# Patient Record
Sex: MALE | Race: WHITE | NOT HISPANIC OR LATINO | Employment: FULL TIME | URBAN - NONMETROPOLITAN AREA
[De-identification: names, ages, dates, MRNs, and addresses within clinical notes are randomized per-mention and may not be internally consistent; named-entity substitution may affect disease eponyms.]

---

## 2017-01-10 ENCOUNTER — OFFICE VISIT (OUTPATIENT)
Dept: GASTROENTEROLOGY | Facility: CLINIC | Age: 62
End: 2017-01-10

## 2017-01-10 VITALS
TEMPERATURE: 98.1 F | OXYGEN SATURATION: 97 % | HEIGHT: 67 IN | SYSTOLIC BLOOD PRESSURE: 136 MMHG | DIASTOLIC BLOOD PRESSURE: 88 MMHG | HEART RATE: 55 BPM | WEIGHT: 209 LBS | BODY MASS INDEX: 32.8 KG/M2

## 2017-01-10 DIAGNOSIS — K63.5 COLON POLYPS: Primary | ICD-10-CM

## 2017-01-10 PROCEDURE — S0260 H&P FOR SURGERY: HCPCS | Performed by: NURSE PRACTITIONER

## 2017-01-10 RX ORDER — SODIUM, POTASSIUM,MAG SULFATES 17.5-3.13G
2 SOLUTION, RECONSTITUTED, ORAL ORAL ONCE
Qty: 2 BOTTLE | Refills: 0 | Status: SHIPPED | OUTPATIENT
Start: 2017-01-10 | End: 2017-01-10

## 2017-01-10 RX ORDER — MELOXICAM 10 MG/1
1 CAPSULE ORAL DAILY
COMMUNITY
End: 2021-06-23 | Stop reason: ALTCHOICE

## 2017-01-10 RX ORDER — LISINOPRIL 20 MG/1
20 TABLET ORAL DAILY
COMMUNITY
Start: 2017-01-03

## 2017-01-10 RX ORDER — FLUTICASONE PROPIONATE 50 MCG
SPRAY, SUSPENSION (ML) NASAL
COMMUNITY
Start: 2016-12-01 | End: 2020-02-12

## 2017-01-10 NOTE — MR AVS SNAPSHOT
Frankie Reid   1/10/2017 2:00 PM   Office Visit    Dept Phone:  [phone]   Encounter #:  [id number]    Provider:  RILEY Rosas   Department:  [facility] GASTROENTEROLOGY                Your Full Care Plan              Today's Medication Changes          These changes are accurate as of: 1/10/17  1:51 PM.  If you have any questions, ask your nurse or doctor.               New Medication(s)Ordered:     sodium-potassium-magnesium sulfates solution oral solution   Commonly known as:  SUPREP BOWEL PREP   Take 2 bottles by mouth 1 (One) Time for 1 dose. Split dose prep as directed by office instructions provided.  2 bottles = one kit.   Started by:  RILEY Rosas            Where to Get Your Medications      These medications were sent to [facility] - [address] - [phone] [facility] [phone] [address], [facility] [address]     Phone:  [phone]     sodium-potassium-magnesium sulfates solution oral solution                  Your Updated Medication List          This list is accurate as of: 1/10/17  1:51 PM.  Always use your most recent med list.                aspirin  MG tablet       fluticasone 50 MCG/ACT nasal spray   Commonly known as:  FLONASE       lisinopril 20 MG tablet   Commonly known as:  PRINIVIL,ZESTRIL       Meloxicam 10 MG capsule       MULTIVITAMIN ADULT PO       sodium-potassium-magnesium sulfates solution oral solution   Commonly known as:  SUPREP BOWEL PREP   Take 2 bottles by mouth 1 (One) Time for 1 dose. Split dose prep as directed by office instructions provided.  2 bottles = one kit.               We Performed the Following     Case request       You Were Diagnosed With        Codes Comments    Colon polyps    -  Primary ICD-10-CM: K63.5  ICD-9-CM: 211.3       Instructions     None    Patient Instructions History      Upcoming Appointments     Visit Type Date Time Department  "   SCOPE SCREENING 1/10/2017  2:00 PM [facility]      MyChart Signup     Our records indicate that you have declined [facility] sign[facility]. If you would like to sign up for [facility], please email [email or web address] or call [phone] to obtain an activation code.             Other Info from Your Visit           Your Appointments     Ash 10, 2017  2:00 PM CST   Scope Screen with RILEY Rosas   [facility] GASTRO[facility]OLOGY (--)    [address] [place] [address] [address]  Merged with [facility] [id number] [phone]              Allergies     Codeine        Reason for Visit     Colon Cancer Screening Patient states he currently is not having any problems.  No family history of colon cancer.      Vital Signs     Blood Pressure Pulse Temperature Height Weight Oxygen Saturation    136/88 55 98.1 °F (36.7 °C) 67\" (170.2 cm) 209 lb (94.8 kg) 97%    Body Mass Index Smoking Status                32.73 kg/m2 Never Smoker          Problems and Diagnoses Noted     Colon polyp        "

## 2017-01-10 NOTE — PROGRESS NOTES
Chief Complaint   Patient presents with   • Colon Cancer Screening     Patient states he currently is not having any problems.  No family history of colon cancer.     Subjective   HPI  Frankie Reid is a 61 y.o. male who presents as a referral for preventative maintenance. He has no complaints of nausea or vomiting. No change in bowels. No wt loss. No BRBPR. No melena. There is no family hx for colon cancer. No abdominal pain.The last colonoscopy 10/25/12 sigmoid polyp.  History reviewed. No pertinent past medical history.  Past Surgical History   Procedure Laterality Date   • Tonsillectomy     • Colonoscopy  10/25/2012       Current Outpatient Prescriptions:   •  aspirin  MG tablet, Take 325 mg by mouth Every 6 (Six) Hours As Needed., Disp: , Rfl:   •  lisinopril (PRINIVIL,ZESTRIL) 20 MG tablet, , Disp: , Rfl:   •  Meloxicam 10 MG capsule, Take 10 mg by mouth daily, Disp: , Rfl:   •  fluticasone (FLONASE) 50 MCG/ACT nasal spray, 2 sprays by Nasal route daily, Disp: , Rfl:   •  Multiple Vitamins-Minerals (MULTIVITAMIN ADULT PO), Take  by mouth., Disp: , Rfl:   •  sodium-potassium-magnesium sulfates (SUPREP BOWEL PREP) solution oral solution, Take 2 bottles by mouth 1 (One) Time for 1 dose. Split dose prep as directed by office instructions provided.  2 bottles = one kit., Disp: 2 bottle, Rfl: 0  Allergies   Allergen Reactions   • Codeine      Social History     Social History   • Marital status:      Spouse name: N/A   • Number of children: N/A   • Years of education: N/A     Occupational History   • Not on file.     Social History Main Topics   • Smoking status: Never Smoker   • Smokeless tobacco: Never Used   • Alcohol use Yes      Comment: daily   • Drug use: No   • Sexual activity: Not on file     Other Topics Concern   • Not on file     Social History Narrative     Family History   Problem Relation Age of Onset   • Colon polyps Sister    • Colon cancer Neg Hx    • Esophageal cancer Neg Hx    • Liver  "cancer Neg Hx    • Liver disease Neg Hx    • Rectal cancer Neg Hx    • Stomach cancer Neg Hx        REVIEW OF SYSTEMS  General: well appearing, no fever chills or sweats, no unexplained wt loss  HEENT: no acute visual or hearing disturbances  Cardiovascular: No chest pain or palpitations  Pulmonary: No shortness of breath, coughing, wheezing or hemoptysis  : No burning, urgency, hematuria, or dysuria  Musculoskeletal: No joint pain or stiffness  Peripheral: no edema  Skin: No lesions or rashes  Neuro: No dizziness, headaches, stroke, syncope  Endocrine: No hot or cold intolerances  Hematological: No blood dyscrasias    Objective   Vitals:    01/10/17 1326   BP: 136/88   Pulse: 55   Temp: 98.1 °F (36.7 °C)   SpO2: 97%   Weight: 209 lb (94.8 kg)   Height: 67\" (170.2 cm)     Body mass index is 32.73 kg/(m^2).    PHYSICAL EXAM  General: age appropriate well nourished well appearing, no acute distress  Head: normocephalic and atraumatic  Global assessment-supple  Neck-No JVD noted, no lymphadenopathy  Pulmonary-clear to auscultation bilaterally, normal respiratory effort  Cardiovascular-normal rate and rhythm, normal heart sounds, S1 and S2 noted  Abdomen-soft, non tender, non distended, normal bowel sounds all 4 quadrants, no hepatosplenomegaly noted  Extremities-No clubbing cyanosis or edema  Neuro-Non focal, converses appropriately, awake, alert, oriented    Imaging Results (most recent)     None        Assessment/Plan   Frankie was seen today for colon cancer screening.    Diagnoses and all orders for this visit:    Colon polyps  -     Case request colonoscopy    Other orders  -     sodium-potassium-magnesium sulfates (SUPREP BOWEL PREP) solution oral solution; Take 2 bottles by mouth 1 (One) Time for 1 dose. Split dose prep as directed by office instructions provided.  2 bottles = one kit.      COLONOSCOPY WITH ANESTHESIA (N/A)  No Follow-up on file.  There are no Patient Instructions on file for this visit.    All " risks, benefits, alternatives, and indications of colonoscopy procedure have been discussed with the patient. Risks to include perforation of the colon requiring possible surgery or colostomy, risk of bleeding from biopsies or removal of colon tissue, possibility of missing a colon polyp or cancer, or adverse drug reaction.  Benefits to include the diagnosis and management of disease of the colon and rectum. Alternatives to include barium enema, radiographic evaluation, lab testing or no intervention. Pt verbalizes understanding and agrees.

## 2017-02-08 ENCOUNTER — ANESTHESIA EVENT (OUTPATIENT)
Dept: GASTROENTEROLOGY | Facility: HOSPITAL | Age: 62
End: 2017-02-08

## 2017-02-10 ENCOUNTER — ANESTHESIA (OUTPATIENT)
Dept: GASTROENTEROLOGY | Facility: HOSPITAL | Age: 62
End: 2017-02-10

## 2017-02-10 ENCOUNTER — HOSPITAL ENCOUNTER (OUTPATIENT)
Facility: HOSPITAL | Age: 62
Setting detail: HOSPITAL OUTPATIENT SURGERY
Discharge: HOME | End: 2017-02-10
Attending: INTERNAL MEDICINE | Admitting: INTERNAL MEDICINE

## 2017-02-10 VITALS
BODY MASS INDEX: 32.33 KG/M2 | WEIGHT: 206 LBS | RESPIRATION RATE: 16 BRPM | TEMPERATURE: 97.9 F | HEART RATE: 60 BPM | DIASTOLIC BLOOD PRESSURE: 75 MMHG | OXYGEN SATURATION: 95 % | SYSTOLIC BLOOD PRESSURE: 129 MMHG | HEIGHT: 67 IN

## 2017-02-10 DIAGNOSIS — K63.5 COLON POLYPS: ICD-10-CM

## 2017-02-10 PROCEDURE — 25010000002 PROPOFOL 10 MG/ML EMULSION: Performed by: NURSE ANESTHETIST, CERTIFIED REGISTERED

## 2017-02-10 PROCEDURE — 88305 TISSUE EXAM BY PATHOLOGIST: CPT | Performed by: INTERNAL MEDICINE

## 2017-02-10 RX ORDER — SODIUM CHLORIDE 9 MG/ML
100 INJECTION, SOLUTION INTRAVENOUS CONTINUOUS
Status: DISCONTINUED | OUTPATIENT
Start: 2017-02-10 | End: 2017-02-10 | Stop reason: HOSPADM

## 2017-02-10 RX ORDER — PROPOFOL 10 MG/ML
VIAL (ML) INTRAVENOUS AS NEEDED
Status: DISCONTINUED | OUTPATIENT
Start: 2017-02-10 | End: 2017-02-10 | Stop reason: SURG

## 2017-02-10 RX ORDER — SODIUM CHLORIDE 0.9 % (FLUSH) 0.9 %
1-10 SYRINGE (ML) INJECTION AS NEEDED
Status: DISCONTINUED | OUTPATIENT
Start: 2017-02-10 | End: 2017-02-10 | Stop reason: HOSPADM

## 2017-02-10 RX ORDER — SODIUM CHLORIDE 0.9 % (FLUSH) 0.9 %
1-10 SYRINGE (ML) INJECTION AS NEEDED
Status: CANCELLED | OUTPATIENT
Start: 2017-02-10

## 2017-02-10 RX ORDER — SODIUM CHLORIDE 9 MG/ML
100 INJECTION, SOLUTION INTRAVENOUS CONTINUOUS
Status: CANCELLED | OUTPATIENT
Start: 2017-02-10

## 2017-02-10 RX ADMIN — PROPOFOL 50 MG: 10 INJECTION, EMULSION INTRAVENOUS at 09:22

## 2017-02-10 RX ADMIN — PROPOFOL 50 MG: 10 INJECTION, EMULSION INTRAVENOUS at 09:31

## 2017-02-10 RX ADMIN — PROPOFOL 50 MG: 10 INJECTION, EMULSION INTRAVENOUS at 09:33

## 2017-02-10 RX ADMIN — PROPOFOL 50 MG: 10 INJECTION, EMULSION INTRAVENOUS at 09:27

## 2017-02-10 RX ADMIN — PROPOFOL 50 MG: 10 INJECTION, EMULSION INTRAVENOUS at 09:25

## 2017-02-10 RX ADMIN — PROPOFOL 50 MG: 10 INJECTION, EMULSION INTRAVENOUS at 09:20

## 2017-02-10 RX ADMIN — PROPOFOL 50 MG: 10 INJECTION, EMULSION INTRAVENOUS at 09:35

## 2017-02-10 RX ADMIN — SODIUM CHLORIDE 100 ML/HR: 9 INJECTION, SOLUTION INTRAVENOUS at 08:37

## 2017-02-10 RX ADMIN — PROPOFOL 50 MG: 10 INJECTION, EMULSION INTRAVENOUS at 09:21

## 2017-02-10 RX ADMIN — PROPOFOL 100 MG: 10 INJECTION, EMULSION INTRAVENOUS at 09:17

## 2017-02-10 NOTE — PLAN OF CARE
Problem: Patient Care Overview (Adult)  Goal: Discharge Needs Assessment  Outcome: Outcome(s) achieved Date Met:  02/10/17    02/10/17 1003   Discharge Needs Assessment   Concerns To Be Addressed no discharge needs identified   Equipment Needed After Discharge none   Discharge Disposition home or self-care   Self-Care   Equipment Currently Used at Home none   Current Health   Anticipated Changes Related to Illness none   Living Environment   Transportation Available car

## 2017-02-10 NOTE — ANESTHESIA PREPROCEDURE EVALUATION
Anesthesia Evaluation     Patient summary reviewed and Nursing notes reviewed   no history of anesthetic complications:  NPO Status: > 8 hours   Airway   Mallampati: III  possible difficult intubation  Dental    (+) upper dentures and lower dentures    Pulmonary - normal exam    breath sounds clear to auscultation  Cardiovascular - normal exam  Exercise tolerance: good (4-7 METS)    Rhythm: regular  Rate: normal    (+) hypertension well controlled,       Neuro/Psych  GI/Hepatic/Renal/Endo    (+) obesity,      Musculoskeletal     Abdominal   (+) obese,     Abdomen: soft.  Bowel sounds: normal.   Substance History   (+) alcohol use,      OB/GYN          Other   (+) arthritis                                 Anesthesia Plan    ASA 2     general     intravenous induction   Anesthetic plan and risks discussed with patient.

## 2017-02-10 NOTE — PLAN OF CARE
Problem: Patient Care Overview (Adult)  Goal: Adult Individualization and Mutuality  Outcome: Outcome(s) achieved Date Met:  02/10/17    02/10/17 1002   Individualization   Patient Specific Preferences none   Patient Specific Goals none   Patient Specific Interventions none   Mutuality/Individual Preferences   What Anxieties, Fears or Concerns Do You Have About Your Health or Care? none   What Questions Do You Have About Your Health or Care? none   What Information Would Help Us Give You More Personalized Care? none

## 2017-02-10 NOTE — ADDENDUM NOTE
Addendum  created 02/10/17 0953 by Malena Stephenson, RICHARD    Anesthesia Event edited, Anesthesia Intra Flowsheets edited, Anesthesia Intra Meds edited, Anesthesia Staff edited, Flowsheet data copied forward, Patient device added, Patient device removed, Procedure Event Log accessed, Sign clinical note

## 2017-02-10 NOTE — ANESTHESIA POSTPROCEDURE EVALUATION
Patient: Frankie Reid    Procedure Summary     Date Anesthesia Start Anesthesia Stop Room / Location    02/10/17 0915 0940 [facility] ENDOSCOPY 5 / [facility] ENDOSCOPY       Procedure Diagnosis Surgeon Provider    COLONOSCOPY WITH ANESTHESIA (N/A ) Colon polyps  (Colon polyps [K63.5]) MD Malena Guzmán CRNA          Anesthesia Type: general  Last vitals  BP      Temp      Pulse     Resp      SpO2        Post Anesthesia Care and Evaluation    Patient location during evaluation: PHASE II  Patient participation: complete - patient participated  Level of consciousness: awake and alert  Pain score: 0  Pain management: adequate  Airway patency: patent  Anesthetic complications: No anesthetic complications    Cardiovascular status: acceptable, hemodynamically stable and stable  Respiratory status: acceptable and room air  Hydration status: acceptable

## 2017-02-10 NOTE — PLAN OF CARE
Problem: Patient Care Overview (Adult)  Goal: Plan of Care Review  Outcome: Outcome(s) achieved Date Met:  02/10/17    02/10/17 1002   Coping/Psychosocial Response Interventions   Plan Of Care Reviewed With patient  (ex spouse)   Outcome Evaluation   Outcome Summary/Follow up Plan d/c criteria met

## 2017-02-10 NOTE — H&P
"    Chief Complaint:   History colon polyps    Subjective     HPI:   Get a colon polyp removed from the sigmoid colon in October 2012.  He denies any symptoms presently.    Past Medical History:   History reviewed. No pertinent past medical history.    Past Surgical History:  [unfilled]    Family History:  Family History   Problem Relation Age of Onset   • Colon polyps Sister    • Colon cancer Neg Hx    • Esophageal cancer Neg Hx    • Liver cancer Neg Hx    • Liver disease Neg Hx    • Rectal cancer Neg Hx    • Stomach cancer Neg Hx        Social History:   reports that he has never smoked. He has never used smokeless tobacco. He reports that he drinks alcohol. He reports that he does not use illicit drugs.    Medications:   Prescriptions Prior to Admission   Medication Sig Dispense Refill Last Dose   • aspirin  MG tablet Take 325 mg by mouth Every 6 (Six) Hours As Needed.   2/6/2017   • fluticasone (FLONASE) 50 MCG/ACT nasal spray 2 sprays by Nasal route daily   More than a month at Unknown time   • lisinopril (PRINIVIL,ZESTRIL) 20 MG tablet    2/8/2017   • Meloxicam 10 MG capsule Take 10 mg by mouth daily   2/6/2017       Allergies:  Codeine    ROS:    General: Weight stable  Resp: No SOA  Cardiovascular: No CP      Objective     Visit Vitals   • /84 (BP Location: Right arm, Patient Position: Sitting)   • Pulse 50   • Temp 97.9 °F (36.6 °C) (Temporal Artery )   • Resp 20   • Ht 67\" (170.2 cm)   • Wt 206 lb (93.4 kg)   • SpO2 96%   • BMI 32.26 kg/m2       Physical Exam   Constitutional: Pt is oriented to person, place, and in no distress.  Eyes: No icterus  ENMT: Unremarkable   Cardiovascular: Normal rate, regular rhythm.    Pulmonary/Chest: No distress.  No audible wheezes  Abdominal: Soft.   Skin: Skin is warm and dry.   Psychiatric: Mood, memory, affect and judgment appear normal.     Assessment/Plan     Diagnosis:  Colon polyps    Anticipated Surgical Procedure:  Colonoscopy    The risks, benefits, and " alternatives of colonoscopy were reviewed with the patient today.  Risks including perforation of the colon possibly requiring surgery or colostomy.  Additional risks include risk of bleeding from biopsies or removal of colon tissue.  There is also the risk of a drug reaction or problems with anesthesia.  This will be discussed with the further by the anesthesia team on the day of the procedure.  Lastly there is a possibility of missing a colon polyp or cancer.  The benefits include the diagnosis and management of disease of the colon and rectum.  Alternatives to colonoscopy include barium enema, laboratory testing, radiographic evaluation, or no intervention.  The patient verbalizes understanding and agrees.

## 2017-02-13 LAB
CYTO UR: NORMAL
LAB AP CASE REPORT: NORMAL
LAB AP CLINICAL INFORMATION: NORMAL
Lab: NORMAL
PATH REPORT.FINAL DX SPEC: NORMAL
PATH REPORT.GROSS SPEC: NORMAL

## 2017-09-18 RX ORDER — LISINOPRIL 20 MG/1
20 TABLET ORAL DAILY
COMMUNITY
End: 2017-10-03 | Stop reason: SDUPTHER

## 2017-09-18 RX ORDER — MELOXICAM 15 MG/1
15 TABLET ORAL DAILY
COMMUNITY
End: 2017-10-03 | Stop reason: SDUPTHER

## 2017-09-18 RX ORDER — SILDENAFIL 100 MG/1
100 TABLET, FILM COATED ORAL PRN
COMMUNITY
End: 2018-10-23 | Stop reason: SDUPTHER

## 2017-10-03 ENCOUNTER — OFFICE VISIT (OUTPATIENT)
Dept: FAMILY MEDICINE CLINIC | Age: 62
End: 2017-10-03
Payer: COMMERCIAL

## 2017-10-03 VITALS
HEIGHT: 67 IN | BODY MASS INDEX: 31.71 KG/M2 | RESPIRATION RATE: 18 BRPM | WEIGHT: 202 LBS | TEMPERATURE: 98.1 F | SYSTOLIC BLOOD PRESSURE: 138 MMHG | DIASTOLIC BLOOD PRESSURE: 86 MMHG | OXYGEN SATURATION: 98 % | HEART RATE: 50 BPM

## 2017-10-03 DIAGNOSIS — N52.9 ERECTILE DYSFUNCTION, UNSPECIFIED ERECTILE DYSFUNCTION TYPE: ICD-10-CM

## 2017-10-03 DIAGNOSIS — Z00.00 ANNUAL PHYSICAL EXAM: Primary | ICD-10-CM

## 2017-10-03 DIAGNOSIS — M25.561 CHRONIC PAIN OF RIGHT KNEE: ICD-10-CM

## 2017-10-03 DIAGNOSIS — G89.29 CHRONIC PAIN OF RIGHT KNEE: ICD-10-CM

## 2017-10-03 DIAGNOSIS — I10 ESSENTIAL (PRIMARY) HYPERTENSION: ICD-10-CM

## 2017-10-03 PROCEDURE — 99396 PREV VISIT EST AGE 40-64: CPT | Performed by: FAMILY MEDICINE

## 2017-10-03 RX ORDER — MELOXICAM 15 MG/1
15 TABLET ORAL DAILY
Qty: 90 TABLET | Refills: 3 | Status: SHIPPED | OUTPATIENT
Start: 2017-10-03 | End: 2018-10-23 | Stop reason: SDUPTHER

## 2017-10-03 RX ORDER — LISINOPRIL 20 MG/1
20 TABLET ORAL DAILY
Qty: 90 TABLET | Refills: 3 | Status: SHIPPED | OUTPATIENT
Start: 2017-10-03 | End: 2018-10-23 | Stop reason: SDUPTHER

## 2017-10-03 ASSESSMENT — PATIENT HEALTH QUESTIONNAIRE - PHQ9
SUM OF ALL RESPONSES TO PHQ QUESTIONS 1-9: 0
2. FEELING DOWN, DEPRESSED OR HOPELESS: 0
SUM OF ALL RESPONSES TO PHQ9 QUESTIONS 1 & 2: 0
1. LITTLE INTEREST OR PLEASURE IN DOING THINGS: 0

## 2017-10-03 NOTE — PROGRESS NOTES
[facility] [address] [place] [address]  Dept: [phone]  Dept Fax: [phone]  Loc: [phone]    Mars Ornelas is a 63 y.o. male who presents today for his medical conditions/complaints as noted below. Mars Ornelas is here for Annual Exam        HPI:   CC: Here today to discuss the following:    Here for yearly physical today. Hypertension  Compliant with medications. No adverse effects from medication. No lightheadedness, palpitations, or chest pain. HPI    Past Medical History:   Diagnosis Date    Hypertension     Joint pain       Past Surgical History:   Procedure Laterality Date    TONSILLECTOMY         Family History   Problem Relation Age of Onset    Diabetes Mother     High Blood Pressure Mother     Heart Disease Father        Social History   Substance Use Topics    Smoking status: Never Smoker    Smokeless tobacco: Never Used    Alcohol use Yes      Current Outpatient Prescriptions   Medication Sig Dispense Refill    lisinopril (PRINIVIL;ZESTRIL) 20 MG tablet Take 1 tablet by mouth daily 90 tablet 3    meloxicam (MOBIC) 15 MG tablet Take 1 tablet by mouth daily 90 tablet 3    aspirin 325 MG EC tablet Take 325 mg by mouth daily      sildenafil (VIAGRA) 100 MG tablet Take 100 mg by mouth as needed for Erectile Dysfunction       No current facility-administered medications for this visit. Allergies   Allergen Reactions    Codeine        Health Maintenance   Topic Date Due    Hepatitis C screen  1955    HIV screen  02/17/1972    DTaP/Tdap/Td vaccine (1 - Tdap) 02/17/1976    Lipid screen  02/17/1997    Diabetes screen  02/17/1997    Colon cancer screen colonoscopy  02/17/2007    Zostavax vaccine  02/17/2017    Flu vaccine (1) 09/01/2017       Subjective:      Review of Systems    Review of Systems   Constitutional: Negative for chills and fever. HENT: Negative for congestion. Respiratory: Negative for cough, chest tightness and shortness of breath. Cardiovascular: Negative for chest pain, palpitations and leg swelling. Gastrointestinal: Negative for abdominal pain, anal bleeding, constipation, diarrhea and nausea. Genitourinary: Negative for difficulty urinating. Psychiatric/Behavioral: Negative. See HPI for visit specific review of symptoms. All others negative      Objective:   /86  Pulse 50  Temp 98.1 °F (36.7 °C)  Resp 18  Ht 5' 7\" (1.702 m)  Wt 202 lb (91.6 kg)  SpO2 98%  BMI 31.64 kg/m2  Physical Exam    Physical Exam   Constitutional: appears well-developed. does not appear ill. Eyes: Pupils are equal, round, and reactive to light. Neck: Normal range of motion. Neck supple. Cardiovascular: Normal rate and regular rhythm. Exam reveals no friction rub. No murmur heard. Pulmonary/Chest: Effort normal and breath sounds normal. No respiratory distress. He has no wheezes. no rales. Abdominal: Soft. Bowel sounds are normal. He exhibits no distension. There is no tenderness. There is no rebound and no guarding. Musculoskeletal: exhibits no edema. Neurological: alert. Psychiatric: normal mood and affect. behavior is normal.     No results found for this or any previous visit (from the past 672 hour(s)). Assessment & Plan: The following diagnoses and conditions are stable with no further orders unless indicated:  1. Annual physical exam  Check the following lab studies prior to next visit  - Comprehensive Metabolic Panel; Future  - Psa screening; Future  - Lipid Panel; Future    2. Essential (primary) hypertension  Discussed lifestyle changes such as diet and exercise. Recommended eliminate processed food from diet such as sugar and fried foods. Recommended exercising at least 150 minutes/week. Try to do full body resistance training twice a week as well.   Offered suggestions for calorie counting such as phone apps and

## 2017-10-03 NOTE — MR AVS SNAPSHOT
After Visit Summary             Chris Noland   10/3/2017 8:15 AM   Office Visit    Description:  Male : 1955   Provider:  Adithya Anaya MD   Department:  [facility] FAMILY MEDICINE              Your Follow-Up and Future Appointments         Below is a list of your follow-up and future appointments. This may not be a complete list as you may have made appointments directly with providers that we are not aware of or your providers may have made some for you. Please call your providers to confirm appointments. It is important to keep your appointments. Please bring your current insurance card, photo ID, co-pay, and all medication bottles to your appointment. If self-pay, payment is expected at the time of service. Your To-Do List     Future Appointments Provider Department Dept Phone    4/3/2018 8:45 AM Adithya Anaya MD [address] [phone]    Please arrive 15 minutes prior to appointment time, bring insurance card and photo ID. Future Orders Complete By Expires    Comprehensive Metabolic Panel [WIN41 Custom]  10/3/2017 10/3/2018    Lipid Panel [LAB18 Custom]  10/3/2017 10/3/2018    Psa screening [[id number] Custom]  10/3/2017 10/3/2018    Follow-Up    Return in about 6 months (around 4/3/2018) for Routine follow up. Information from Your Visit        Department     Name Address Phone Fax    [address] [address] [address]  [phone]      You Were Seen for:         Comments    Essential (primary) hypertension   [211625]         Vital Signs     Blood Pressure Pulse Temperature Respirations Height Weight    138/86 50 98.1 °F (36.7 °C) 18 5' 7\" (1.702 m) 202 lb (91.6 kg)    Oxygen Saturation Body Mass Index Smoking Status             98% 31.64 kg/m2 Never Smoker         Additional Information about your Body Mass Index (BMI)           Your BMI as listed above is considered obese (30 or more).  BMI is an Tetanus Combination Vaccine (1 - Tdap) 2/17/1976    Cholesterol Screening 2/17/1997    Diabetes Screening 2/17/1997    Colonoscopy 2/17/2007    Zoster Vaccine 2/17/2017    Yearly Flu Vaccine (1) 9/1/2017            MyChart Signup           [employer]ast allows you to send messages to your doctor, view your test results, renew your prescriptions, schedule appointments, view visit notes, and more. How Do I Sign Up? 1. In your Internet browser, go to [email or web address]  2. Click on the Sign Up Now link in the Sign In box. You will see the New Member Sign Up page. 3. Enter your enercast Access Code exactly as it appears below. You will not need to use this code after youve completed the sign-up process. If you do not sign up before the expiration date, you must request a new code. enercast Access Code: [email or web address]  Expires: 12/2/2017  8:50 AM    4. Enter your Social Security Number ([id number]) and Date of Birth (mm/dd/yyyy) as indicated and click Submit. You will be taken to the next sign-up page. 5. Create a enercast ID. This will be your enercast login ID and cannot be changed, so think of one that is secure and easy to remember. 6. Create a enercast password. You can change your password at any time. 7. Enter your Password Reset Question and Answer. This can be used at a later time if you forget your password. 8. Enter your e-mail address. You will receive e-mail notification when new information is available in [address]. 9. Click Sign Up. You can now view your medical record. Additional Information  If you have questions, please contact the physician practice where you receive care. Remember, en[employer]ast is NOT to be used for urgent needs. For medical emergencies, dial 911. For questions regarding your enercast account call [phone]. If you have a clinical question, please call your doctor's office.

## 2017-10-05 DIAGNOSIS — Z00.00 ANNUAL PHYSICAL EXAM: ICD-10-CM

## 2017-10-05 LAB
ALBUMIN SERPL-MCNC: 3.9 G/DL (ref 3.5–5.2)
ALP BLD-CCNC: 62 U/L (ref 40–130)
ALT SERPL-CCNC: 28 U/L (ref 5–41)
ANION GAP SERPL CALCULATED.3IONS-SCNC: 13 MMOL/L (ref 7–19)
AST SERPL-CCNC: 22 U/L (ref 5–40)
BILIRUB SERPL-MCNC: 1.1 MG/DL (ref 0.2–1.2)
BUN BLDV-MCNC: 18 MG/DL (ref 8–23)
CALCIUM SERPL-MCNC: 9.1 MG/DL (ref 8.8–10.2)
CHLORIDE BLD-SCNC: 101 MMOL/L (ref 98–111)
CHOLESTEROL, TOTAL: 184 MG/DL (ref 160–199)
CO2: 26 MMOL/L (ref 22–29)
CREAT SERPL-MCNC: 1.2 MG/DL (ref 0.5–1.2)
GFR NON-AFRICAN AMERICAN: >60
GLUCOSE BLD-MCNC: 97 MG/DL (ref 74–109)
HDLC SERPL-MCNC: 41 MG/DL (ref 55–121)
LDL CHOLESTEROL CALCULATED: 109 MG/DL
POTASSIUM SERPL-SCNC: 3.8 MMOL/L (ref 3.5–5)
PROSTATE SPECIFIC ANTIGEN: 0.73 NG/ML (ref 0–4)
SODIUM BLD-SCNC: 140 MMOL/L (ref 136–145)
TOTAL PROTEIN: 7.4 G/DL (ref 6.6–8.7)
TRIGL SERPL-MCNC: 171 MG/DL (ref 149–199)

## 2017-12-04 ENCOUNTER — TELEPHONE (OUTPATIENT)
Dept: FAMILY MEDICINE CLINIC | Age: 62
End: 2017-12-04

## 2018-04-12 ENCOUNTER — OFFICE VISIT (OUTPATIENT)
Dept: FAMILY MEDICINE CLINIC | Age: 63
End: 2018-04-12
Payer: COMMERCIAL

## 2018-04-12 VITALS
RESPIRATION RATE: 18 BRPM | TEMPERATURE: 96.7 F | HEART RATE: 64 BPM | DIASTOLIC BLOOD PRESSURE: 86 MMHG | BODY MASS INDEX: 32.58 KG/M2 | WEIGHT: 208 LBS | SYSTOLIC BLOOD PRESSURE: 138 MMHG | OXYGEN SATURATION: 93 %

## 2018-04-12 DIAGNOSIS — M25.561 CHRONIC PAIN OF RIGHT KNEE: ICD-10-CM

## 2018-04-12 DIAGNOSIS — Z12.5 SCREENING PSA (PROSTATE SPECIFIC ANTIGEN): ICD-10-CM

## 2018-04-12 DIAGNOSIS — Z13.220 LIPID SCREENING: ICD-10-CM

## 2018-04-12 DIAGNOSIS — G89.29 CHRONIC PAIN OF RIGHT KNEE: ICD-10-CM

## 2018-04-12 DIAGNOSIS — Z23 NEED FOR PROPHYLACTIC VACCINATION OR INOCULATION AGAINST DIPHTHERIA AND TETANUS: ICD-10-CM

## 2018-04-12 DIAGNOSIS — N52.9 ERECTILE DYSFUNCTION, UNSPECIFIED ERECTILE DYSFUNCTION TYPE: ICD-10-CM

## 2018-04-12 DIAGNOSIS — I10 ESSENTIAL (PRIMARY) HYPERTENSION: Primary | ICD-10-CM

## 2018-04-12 PROCEDURE — 99213 OFFICE O/P EST LOW 20 MIN: CPT | Performed by: FAMILY MEDICINE

## 2018-04-12 PROCEDURE — 3017F COLORECTAL CA SCREEN DOC REV: CPT | Performed by: FAMILY MEDICINE

## 2018-04-12 PROCEDURE — 1036F TOBACCO NON-USER: CPT | Performed by: FAMILY MEDICINE

## 2018-04-12 PROCEDURE — 90715 TDAP VACCINE 7 YRS/> IM: CPT | Performed by: FAMILY MEDICINE

## 2018-04-12 PROCEDURE — G8417 CALC BMI ABV UP PARAM F/U: HCPCS | Performed by: FAMILY MEDICINE

## 2018-04-12 PROCEDURE — 90471 IMMUNIZATION ADMIN: CPT | Performed by: FAMILY MEDICINE

## 2018-04-12 PROCEDURE — G8427 DOCREV CUR MEDS BY ELIG CLIN: HCPCS | Performed by: FAMILY MEDICINE

## 2018-04-20 ASSESSMENT — ENCOUNTER SYMPTOMS
CHEST TIGHTNESS: 0
NAUSEA: 0
SHORTNESS OF BREATH: 0
ABDOMINAL PAIN: 0
ANAL BLEEDING: 0
COUGH: 0
CONSTIPATION: 0
DIARRHEA: 0

## 2018-10-16 DIAGNOSIS — Z12.5 SCREENING PSA (PROSTATE SPECIFIC ANTIGEN): ICD-10-CM

## 2018-10-16 DIAGNOSIS — Z13.220 LIPID SCREENING: ICD-10-CM

## 2018-10-16 LAB
ALBUMIN SERPL-MCNC: 4.1 G/DL (ref 3.5–5.2)
ALP BLD-CCNC: 63 U/L (ref 40–130)
ALT SERPL-CCNC: 34 U/L (ref 5–41)
ANION GAP SERPL CALCULATED.3IONS-SCNC: 13 MMOL/L (ref 7–19)
AST SERPL-CCNC: 28 U/L (ref 5–40)
BILIRUB SERPL-MCNC: 0.9 MG/DL (ref 0.2–1.2)
BUN BLDV-MCNC: 17 MG/DL (ref 8–23)
CALCIUM SERPL-MCNC: 9.2 MG/DL (ref 8.8–10.2)
CHLORIDE BLD-SCNC: 102 MMOL/L (ref 98–111)
CHOLESTEROL, TOTAL: 162 MG/DL (ref 160–199)
CO2: 25 MMOL/L (ref 22–29)
CREAT SERPL-MCNC: 1.1 MG/DL (ref 0.5–1.2)
GFR NON-AFRICAN AMERICAN: >60
GLUCOSE BLD-MCNC: 91 MG/DL (ref 74–109)
HDLC SERPL-MCNC: 45 MG/DL (ref 55–121)
LDL CHOLESTEROL CALCULATED: 82 MG/DL
POTASSIUM SERPL-SCNC: 3.8 MMOL/L (ref 3.5–5)
PROSTATE SPECIFIC ANTIGEN: 0.96 NG/ML (ref 0–4)
SODIUM BLD-SCNC: 140 MMOL/L (ref 136–145)
TOTAL PROTEIN: 7.4 G/DL (ref 6.6–8.7)
TRIGL SERPL-MCNC: 173 MG/DL (ref 0–149)

## 2018-10-23 ENCOUNTER — OFFICE VISIT (OUTPATIENT)
Dept: FAMILY MEDICINE CLINIC | Age: 63
End: 2018-10-23
Payer: COMMERCIAL

## 2018-10-23 VITALS
RESPIRATION RATE: 16 BRPM | DIASTOLIC BLOOD PRESSURE: 82 MMHG | HEART RATE: 58 BPM | HEIGHT: 67 IN | OXYGEN SATURATION: 96 % | SYSTOLIC BLOOD PRESSURE: 132 MMHG | TEMPERATURE: 97.7 F | WEIGHT: 206 LBS | BODY MASS INDEX: 32.33 KG/M2

## 2018-10-23 DIAGNOSIS — G89.29 CHRONIC PAIN OF RIGHT KNEE: ICD-10-CM

## 2018-10-23 DIAGNOSIS — Z00.00 ANNUAL PHYSICAL EXAM: Primary | ICD-10-CM

## 2018-10-23 DIAGNOSIS — M25.561 CHRONIC PAIN OF RIGHT KNEE: ICD-10-CM

## 2018-10-23 DIAGNOSIS — Z23 NEEDS FLU SHOT: ICD-10-CM

## 2018-10-23 DIAGNOSIS — I10 ESSENTIAL (PRIMARY) HYPERTENSION: ICD-10-CM

## 2018-10-23 PROCEDURE — 90686 IIV4 VACC NO PRSV 0.5 ML IM: CPT | Performed by: FAMILY MEDICINE

## 2018-10-23 PROCEDURE — 99396 PREV VISIT EST AGE 40-64: CPT | Performed by: FAMILY MEDICINE

## 2018-10-23 PROCEDURE — G8482 FLU IMMUNIZE ORDER/ADMIN: HCPCS | Performed by: FAMILY MEDICINE

## 2018-10-23 PROCEDURE — 90471 IMMUNIZATION ADMIN: CPT | Performed by: FAMILY MEDICINE

## 2018-10-23 RX ORDER — MELOXICAM 15 MG/1
15 TABLET ORAL DAILY
Qty: 90 TABLET | Refills: 3 | Status: SHIPPED | OUTPATIENT
Start: 2018-10-23 | End: 2020-01-09

## 2018-10-23 RX ORDER — SILDENAFIL 100 MG/1
100 TABLET, FILM COATED ORAL PRN
Qty: 3 TABLET | Refills: 11 | Status: SHIPPED | OUTPATIENT
Start: 2018-10-23 | End: 2019-10-25 | Stop reason: SDUPTHER

## 2018-10-23 RX ORDER — LISINOPRIL 20 MG/1
20 TABLET ORAL DAILY
Qty: 90 TABLET | Refills: 3 | Status: SHIPPED | OUTPATIENT
Start: 2018-10-23 | End: 2019-10-25 | Stop reason: SDUPTHER

## 2018-10-23 ASSESSMENT — PATIENT HEALTH QUESTIONNAIRE - PHQ9
SUM OF ALL RESPONSES TO PHQ QUESTIONS 1-9: 0
2. FEELING DOWN, DEPRESSED OR HOPELESS: 0
1. LITTLE INTEREST OR PLEASURE IN DOING THINGS: 0
SUM OF ALL RESPONSES TO PHQ9 QUESTIONS 1 & 2: 0
SUM OF ALL RESPONSES TO PHQ QUESTIONS 1-9: 0

## 2018-10-23 NOTE — PROGRESS NOTES
screen  10/16/2023    DTaP/Tdap/Td vaccine (2 - Td) 04/12/2028    Flu vaccine  Completed       Subjective:      Review of Systems    Review of Systems   Constitutional: Negative for chills and fever. HENT: Negative for congestion. Respiratory: Negative for cough, chest tightness and shortness of breath. Cardiovascular: Negative for chest pain, palpitations and leg swelling. Gastrointestinal: Negative for abdominal pain, anal bleeding, constipation, diarrhea and nausea. Genitourinary: Negative for difficulty urinating. Psychiatric/Behavioral: Negative. SeeHPI for visit specific review of symptoms. All others negative      Objective:   /82   Pulse 58   Temp 97.7 °F (36.5 °C)   Resp 16   Ht 5' 7\" (1.702 m)   Wt 206 lb (93.4 kg)   SpO2 96%   BMI 32.26 kg/m²   Physical Exam    Physical Exam   Constitutional: appears well-developed. does not appear ill. Eyes: Pupils are equal, round, and reactive to light. Neck: Normal range of motion. Neck supple. Cardiovascular: Normal rate and regular rhythm. Exam reveals no friction rub. No murmur heard. Pulmonary/Chest: Effort normal and breath sounds normal. No respiratory distress. He has no wheezes. no rales. Abdominal: Soft. Bowel sounds are normal. He exhibits no distension. There is no tenderness. There is no rebound and no guarding. Musculoskeletal: exhibits no edema. Neurological: alert. Psychiatric: normal mood and affect.  behavior is normal.     Recent Results (from the past 672 hour(s))   Psa screening    Collection Time: 10/16/18  8:30 AM   Result Value Ref Range    PSA 0.96 0.00 - 4.00 ng/mL   Lipid Panel    Collection Time: 10/16/18  8:30 AM   Result Value Ref Range    Cholesterol, Total 162 160 - 199 mg/dL    Triglycerides 173 (H) 0 - 149 mg/dL    HDL 45 (L) 55 - 121 mg/dL    LDL Calculated 82 <100 mg/dL   Comprehensive Metabolic Panel    Collection Time: 10/16/18  8:30 AM   Result Value Ref Range    Sodium 140 136

## 2018-11-10 ENCOUNTER — OFFICE VISIT (OUTPATIENT)
Dept: URGENT CARE | Age: 63
End: 2018-11-10
Payer: COMMERCIAL

## 2018-11-10 VITALS
DIASTOLIC BLOOD PRESSURE: 77 MMHG | OXYGEN SATURATION: 96 % | TEMPERATURE: 98.6 F | HEIGHT: 67 IN | SYSTOLIC BLOOD PRESSURE: 127 MMHG | BODY MASS INDEX: 31.76 KG/M2 | WEIGHT: 202.38 LBS | RESPIRATION RATE: 14 BRPM | HEART RATE: 56 BPM

## 2018-11-10 DIAGNOSIS — J01.90 ACUTE SINUSITIS, RECURRENCE NOT SPECIFIED, UNSPECIFIED LOCATION: Primary | ICD-10-CM

## 2018-11-10 PROCEDURE — G8417 CALC BMI ABV UP PARAM F/U: HCPCS | Performed by: NURSE PRACTITIONER

## 2018-11-10 PROCEDURE — G8427 DOCREV CUR MEDS BY ELIG CLIN: HCPCS | Performed by: NURSE PRACTITIONER

## 2018-11-10 PROCEDURE — 99213 OFFICE O/P EST LOW 20 MIN: CPT | Performed by: NURSE PRACTITIONER

## 2018-11-10 PROCEDURE — G8482 FLU IMMUNIZE ORDER/ADMIN: HCPCS | Performed by: NURSE PRACTITIONER

## 2018-11-10 PROCEDURE — 3017F COLORECTAL CA SCREEN DOC REV: CPT | Performed by: NURSE PRACTITIONER

## 2018-11-10 PROCEDURE — 96372 THER/PROPH/DIAG INJ SC/IM: CPT | Performed by: NURSE PRACTITIONER

## 2018-11-10 PROCEDURE — 1036F TOBACCO NON-USER: CPT | Performed by: NURSE PRACTITIONER

## 2018-11-10 RX ORDER — DOXYCYCLINE HYCLATE 100 MG
100 TABLET ORAL 2 TIMES DAILY
Qty: 20 TABLET | Refills: 0 | Status: SHIPPED | OUTPATIENT
Start: 2018-11-10 | End: 2018-11-20

## 2018-11-10 RX ORDER — DEXAMETHASONE SODIUM PHOSPHATE 10 MG/ML
10 INJECTION INTRAMUSCULAR; INTRAVENOUS ONCE
Status: COMPLETED | OUTPATIENT
Start: 2018-11-10 | End: 2018-11-10

## 2018-11-10 RX ORDER — BENZONATATE 100 MG/1
100 CAPSULE ORAL 3 TIMES DAILY PRN
Qty: 30 CAPSULE | Refills: 0 | Status: SHIPPED | OUTPATIENT
Start: 2018-11-10 | End: 2018-11-17

## 2018-11-10 RX ADMIN — DEXAMETHASONE SODIUM PHOSPHATE 10 MG: 10 INJECTION INTRAMUSCULAR; INTRAVENOUS at 08:51

## 2018-11-10 ASSESSMENT — ENCOUNTER SYMPTOMS
SORE THROAT: 0
WHEEZING: 0
COUGH: 1
GASTROINTESTINAL NEGATIVE: 1
EYES NEGATIVE: 1
ALLERGIC/IMMUNOLOGIC NEGATIVE: 1
SINUS PAIN: 1

## 2019-04-03 ENCOUNTER — OFFICE VISIT (OUTPATIENT)
Dept: URGENT CARE | Age: 64
End: 2019-04-03
Payer: COMMERCIAL

## 2019-04-03 VITALS
TEMPERATURE: 99.5 F | HEART RATE: 86 BPM | OXYGEN SATURATION: 97 % | DIASTOLIC BLOOD PRESSURE: 87 MMHG | HEIGHT: 67 IN | SYSTOLIC BLOOD PRESSURE: 145 MMHG | WEIGHT: 207 LBS | RESPIRATION RATE: 18 BRPM | BODY MASS INDEX: 32.49 KG/M2

## 2019-04-03 DIAGNOSIS — J06.9 URI, ACUTE: Primary | ICD-10-CM

## 2019-04-03 PROCEDURE — G8417 CALC BMI ABV UP PARAM F/U: HCPCS | Performed by: NURSE PRACTITIONER

## 2019-04-03 PROCEDURE — G8427 DOCREV CUR MEDS BY ELIG CLIN: HCPCS | Performed by: NURSE PRACTITIONER

## 2019-04-03 PROCEDURE — 1036F TOBACCO NON-USER: CPT | Performed by: NURSE PRACTITIONER

## 2019-04-03 PROCEDURE — 3017F COLORECTAL CA SCREEN DOC REV: CPT | Performed by: NURSE PRACTITIONER

## 2019-04-03 PROCEDURE — 99213 OFFICE O/P EST LOW 20 MIN: CPT | Performed by: NURSE PRACTITIONER

## 2019-04-03 RX ORDER — DEXTROMETHORPHAN HYDROBROMIDE AND PROMETHAZINE HYDROCHLORIDE 15; 6.25 MG/5ML; MG/5ML
SYRUP ORAL
Qty: 177.44 ML | Refills: 0 | Status: SHIPPED | OUTPATIENT
Start: 2019-04-03 | End: 2019-04-23 | Stop reason: ALTCHOICE

## 2019-04-03 RX ORDER — METHYLPREDNISOLONE 4 MG/1
TABLET ORAL
Qty: 1 KIT | Refills: 0 | Status: SHIPPED | OUTPATIENT
Start: 2019-04-03 | End: 2019-04-23 | Stop reason: ALTCHOICE

## 2019-04-03 RX ORDER — BENZONATATE 100 MG/1
100 CAPSULE ORAL 3 TIMES DAILY PRN
Qty: 21 CAPSULE | Refills: 0 | Status: SHIPPED | OUTPATIENT
Start: 2019-04-03 | End: 2019-04-10

## 2019-04-03 ASSESSMENT — ENCOUNTER SYMPTOMS
EYES NEGATIVE: 1
GASTROINTESTINAL NEGATIVE: 1
VOICE CHANGE: 0
SHORTNESS OF BREATH: 0
COUGH: 1
TROUBLE SWALLOWING: 0
RHINORRHEA: 0
SINUS PRESSURE: 0
ALLERGIC/IMMUNOLOGIC NEGATIVE: 1
SORE THROAT: 1

## 2019-04-03 NOTE — LETTER
[facility]  [address] [place] [address]  Phone: [phone]    TODD Alves - CNP        April 3, 2019     Patient: Chula Rivera   YOB: 1955   Date of Visit: 4/3/2019       To Whom it May Concern:    William Huffman was seen in my clinic on 4/3/2019. He may return to work on 04/04/19. If you have any questions or concerns, please don't hesitate to call.     Sincerely,         TODD Alves CNP

## 2019-04-03 NOTE — PROGRESS NOTES
left. No tonsillar exudate. Eyes: Conjunctivae are normal.   Neck: Normal range of motion. Cardiovascular: Normal rate and regular rhythm. Pulmonary/Chest: Effort normal and breath sounds normal.   Abdominal: Soft. Normal appearance and bowel sounds are normal.   Lymphadenopathy:        Head (right side): No submental, no submandibular, no tonsillar, no preauricular, no posterior auricular and no occipital adenopathy present. Head (left side): No submental, no submandibular, no tonsillar, no preauricular, no posterior auricular and no occipital adenopathy present. He has no cervical adenopathy. Neurological: He is alert and oriented to person, place, and time. Skin: Skin is warm and intact. Capillary refill takes less than 2 seconds. No rash noted. Psychiatric: He has a normal mood and affect. His speech is normal and behavior is normal. Judgment and thought content normal. Cognition and memory are normal.   Vitals reviewed. BP (!) 145/87   Pulse 86   Temp 99.5 °F (37.5 °C)   Resp 18   Ht 5' 7\" (1.702 m)   Wt 207 lb (93.9 kg)   SpO2 97%   BMI 32.42 kg/m²     Assessment:          Diagnosis Orders   1. URI, acute  methylPREDNISolone (MEDROL, AGGIE,) 4 MG tablet       Plan:    No orders of the defined types were placed in this encounter. No follow-ups on file. No orders of the defined types were placed in this encounter. Orders Placed This Encounter   Medications    benzonatate (TESSALON PERLES) 100 MG capsule     Sig: Take 1 capsule by mouth 3 times daily as needed for Cough     Dispense:  21 capsule     Refill:  0    promethazine-dextromethorphan (PROMETHAZINE-DM) 6.25-15 MG/5ML syrup     Sig: Take one teaspoon only at night as needed for cough. Don't take and drive     Dispense:  177.44 mL     Refill:  0    methylPREDNISolone (MEDROL, AGGIE,) 4 MG tablet     Sig: Take by mouth.      Dispense:  1 kit     Refill:  0       Patient given educationalmaterials - see patient instructions. Discussed use, benefit, and side effectsof prescribed medications. All patient questions answered. Pt voiced understanding. Reviewed health maintenance. Instructed to continue current medications, diet andexercise. Patient agreed with treatment plan. Follow up as directed. Patient Instructions   1. Mucinex an or zyrtec  2. Tessalon perls as needed for cough  3. Phenergan DM only at night  4. Rest and increase fluids  5. Follow up as needed  6.  Take medrol dose pack as prescribed        Electronically signed by TODD Montilla CNP on 4/3/2019 at 2:01 PM

## 2019-04-03 NOTE — PATIENT INSTRUCTIONS
1. Mucinex an or zyrtec  2. Tessalon perls as needed for cough  3. Phenergan DM only at night  4. Rest and increase fluids  5. Follow up as needed  6.  Take medrol dose pack as prescribed

## 2019-04-23 ENCOUNTER — OFFICE VISIT (OUTPATIENT)
Dept: FAMILY MEDICINE CLINIC | Age: 64
End: 2019-04-23
Payer: COMMERCIAL

## 2019-04-23 VITALS
HEART RATE: 62 BPM | BODY MASS INDEX: 32.73 KG/M2 | WEIGHT: 209 LBS | TEMPERATURE: 98.2 F | OXYGEN SATURATION: 98 % | SYSTOLIC BLOOD PRESSURE: 118 MMHG | DIASTOLIC BLOOD PRESSURE: 86 MMHG

## 2019-04-23 DIAGNOSIS — I10 ESSENTIAL (PRIMARY) HYPERTENSION: Primary | ICD-10-CM

## 2019-04-23 DIAGNOSIS — Z12.5 ENCOUNTER FOR PROSTATE CANCER SCREENING: ICD-10-CM

## 2019-04-23 DIAGNOSIS — N52.9 ERECTILE DYSFUNCTION, UNSPECIFIED ERECTILE DYSFUNCTION TYPE: ICD-10-CM

## 2019-04-23 DIAGNOSIS — J20.9 ACUTE BRONCHITIS, UNSPECIFIED ORGANISM: ICD-10-CM

## 2019-04-23 DIAGNOSIS — G89.29 CHRONIC PAIN OF RIGHT KNEE: ICD-10-CM

## 2019-04-23 DIAGNOSIS — Z13.220 LIPID SCREENING: ICD-10-CM

## 2019-04-23 DIAGNOSIS — M25.561 CHRONIC PAIN OF RIGHT KNEE: ICD-10-CM

## 2019-04-23 PROCEDURE — 99214 OFFICE O/P EST MOD 30 MIN: CPT | Performed by: FAMILY MEDICINE

## 2019-04-23 PROCEDURE — 1036F TOBACCO NON-USER: CPT | Performed by: FAMILY MEDICINE

## 2019-04-23 PROCEDURE — G8417 CALC BMI ABV UP PARAM F/U: HCPCS | Performed by: FAMILY MEDICINE

## 2019-04-23 PROCEDURE — G8427 DOCREV CUR MEDS BY ELIG CLIN: HCPCS | Performed by: FAMILY MEDICINE

## 2019-04-23 PROCEDURE — 96372 THER/PROPH/DIAG INJ SC/IM: CPT | Performed by: FAMILY MEDICINE

## 2019-04-23 PROCEDURE — 3017F COLORECTAL CA SCREEN DOC REV: CPT | Performed by: FAMILY MEDICINE

## 2019-04-23 RX ORDER — ALBUTEROL SULFATE 90 UG/1
2 AEROSOL, METERED RESPIRATORY (INHALATION) EVERY 6 HOURS PRN
Qty: 1 INHALER | Refills: 0 | Status: SHIPPED | OUTPATIENT
Start: 2019-04-23

## 2019-04-23 RX ORDER — AZITHROMYCIN 250 MG/1
TABLET, FILM COATED ORAL
Qty: 1 PACKET | Refills: 0 | Status: SHIPPED | OUTPATIENT
Start: 2019-04-23 | End: 2019-10-25 | Stop reason: ALTCHOICE

## 2019-04-23 RX ORDER — MONTELUKAST SODIUM 10 MG/1
10 TABLET ORAL DAILY
Qty: 30 TABLET | Refills: 3 | Status: SHIPPED | OUTPATIENT
Start: 2019-04-23 | End: 2021-04-05

## 2019-04-23 RX ORDER — DEXAMETHASONE SODIUM PHOSPHATE 10 MG/ML
10 INJECTION INTRAMUSCULAR; INTRAVENOUS ONCE
Status: COMPLETED | OUTPATIENT
Start: 2019-04-23 | End: 2019-04-23

## 2019-04-23 RX ADMIN — DEXAMETHASONE SODIUM PHOSPHATE 10 MG: 10 INJECTION INTRAMUSCULAR; INTRAVENOUS at 09:06

## 2019-04-23 ASSESSMENT — ENCOUNTER SYMPTOMS
ANAL BLEEDING: 0
COUGH: 1
ABDOMINAL PAIN: 0
CHEST TIGHTNESS: 0
NAUSEA: 0
DIARRHEA: 0
SHORTNESS OF BREATH: 0
CONSTIPATION: 0
BACK PAIN: 0

## 2019-04-23 ASSESSMENT — PATIENT HEALTH QUESTIONNAIRE - PHQ9
1. LITTLE INTEREST OR PLEASURE IN DOING THINGS: 0
SUM OF ALL RESPONSES TO PHQ9 QUESTIONS 1 & 2: 0
2. FEELING DOWN, DEPRESSED OR HOPELESS: 0
SUM OF ALL RESPONSES TO PHQ QUESTIONS 1-9: 0
SUM OF ALL RESPONSES TO PHQ QUESTIONS 1-9: 0

## 2019-04-23 NOTE — PATIENT INSTRUCTIONS
1. Albuterol: 2 puffs three times a day for 5 days. Then as needed  2. Received steroid shot today  3. Start Azithromycin today  4. Start singulair today. May need to take singulair for a month or longer. _______________________________________________________________    Go to room 201 for labs prior to next visit. Be sure to fast for at least 12 hours prior to laboratory appointment. Would recommend getting labs about 1 week prior to the appointment time to ensure they are available at the time of the appointment. No appointment is necessary for laboratory work as the orders have already been placed.     Lab opens at 6:30 am and closes at 5:00 pm.

## 2019-04-23 NOTE — PROGRESS NOTES
tablet by mouth daily 90 tablet 3    meloxicam (MOBIC) 15 MG tablet Take 1 tablet by mouth daily 90 tablet 3    sildenafil (VIAGRA) 100 MG tablet Take 1 tablet by mouth as needed for Erectile Dysfunction 3 tablet 11    aspirin 325 MG EC tablet Take 325 mg by mouth daily       No current facility-administered medications for this visit. Allergies   Allergen Reactions    Codeine        Health Maintenance   Topic Date Due    Hepatitis C screen  1955    HIV screen  02/17/1972    Shingles Vaccine (1 of 2) 02/17/2007    Potassium monitoring  10/16/2019    Creatinine monitoring  10/16/2019    Colon cancer screen colonoscopy  02/20/2020    Lipid screen  10/16/2023    DTaP/Tdap/Td vaccine (2 - Td) 04/12/2028    Flu vaccine  Completed    Pneumococcal 0-64 years Vaccine  Aged Out       Subjective:      Review of Systems   Constitutional: Negative for chills and fever. HENT: Negative for congestion. Respiratory: Positive for cough. Negative for chest tightness and shortness of breath. Cardiovascular: Negative for chest pain, palpitations and leg swelling. Gastrointestinal: Negative for abdominal pain, anal bleeding, constipation, diarrhea and nausea. Genitourinary: Negative for difficulty urinating. Musculoskeletal: Positive for arthralgias and myalgias. Negative for back pain, gait problem, joint swelling and neck pain. Psychiatric/Behavioral: Negative. SeeHPI for visit specific review of symptoms. All others negative      Objective:   /86   Pulse 62   Temp 98.2 °F (36.8 °C)   Wt 209 lb (94.8 kg)   SpO2 98%   BMI 32.73 kg/m²   Physical Exam   Constitutional: He appears well-developed. He does not appear ill. Eyes: Pupils are equal, round, and reactive to light. Neck: Normal range of motion. Neck supple. Cardiovascular: Normal rate and regular rhythm. Exam reveals no friction rub. No murmur heard.   Pulmonary/Chest: Effort normal and breath sounds normal. No respiratory distress. He has no wheezes. He has no rales. Abdominal: Soft. Bowel sounds are normal. He exhibits no distension. There is no tenderness. There is no rebound and no guarding. Musculoskeletal: He exhibits no edema. Neurological: He is alert. Psychiatric: He has a normal mood and affect. His behavior is normal.         No results found for this or any previous visit (from the past 672 hour(s)). Assessment & Plan: The following diagnoses and conditions are stable with no further orders unless indicated:  1. Essential (primary) hypertension  BP Readings from Last 3 Encounters:   04/23/19 118/86   04/03/19 (!) 145/87   11/10/18 127/77     Blood pressure stable  - Comprehensive Metabolic Panel; Future    2. Encounter for prostate cancer screening    - Psa screening; Future    3. Lipid screening  Lab Results   Component Value Date    CHOL 162 10/16/2018    CHOL 184 10/05/2017     Lab Results   Component Value Date    TRIG 173 (H) 10/16/2018    TRIG 171 10/05/2017     Lab Results   Component Value Date    HDL 45 (L) 10/16/2018    HDL 41 (L) 10/05/2017     Lab Results   Component Value Date    LDLCALC 82 10/16/2018    LDLCALC 109 10/05/2017     No results found for: LABVLDL, VLDL  No results found for: CHOLHDLRATIO  Would recommend checking lipids next visit. - Lipid Panel; Future    4. Chronic pain of right knee  Continue meloxicam as needed    5. Acute bronchitis, unspecified organism  Patient Instructions   1. Albuterol: 2 puffs three times a day for 5 days. Then as needed  2. Received steroid shot today  3. Start Azithromycin today  4. Start singulair today. May need to take singulair for a month or longer. _______________________________________________________________    Go to room 201 for labs prior to next visit. Be sure to fast for at least 12 hours prior to laboratory appointment.    Would recommend getting labs about 1 week prior to the appointment time to ensure they are available at the time of the appointment. No appointment is necessary for laboratory work as the orders have already been placed. Lab opens at 6:30 am and closes at 5:00 pm.     Discussed risks and benefits of this new medication. Discussed potential side effects. He voiced understanding.       - dexamethasone (DECADRON) injection 10 mg  - albuterol sulfate  (90 Base) MCG/ACT inhaler; Inhale 2 puffs into the lungs every 6 hours as needed for Wheezing  Dispense: 1 Inhaler; Refill: 0  - montelukast (SINGULAIR) 10 MG tablet; Take 1 tablet by mouth daily  Dispense: 30 tablet; Refill: 3  - azithromycin (ZITHROMAX) 250 MG tablet; Take 2 tabs (500 mg) on Day 1, and take 1 tab (250 mg) on days 2 through 5. Dispense: 1 packet; Refill: 0    6. Erectile dysfunction, unspecified erectile dysfunction type  Stable            Return in about 6 months (around 10/23/2019) for Yearly Physical - 30 minute visit. Discussed use, benefit, and side effects of prescribed medications. All patient questions answered. Pt voiced understanding. Reviewed health maintenance. Instructedto continue current medications, diet and exercise. Patient agreed with treatmentplan. Follow up as directed.

## 2019-10-18 DIAGNOSIS — Z13.220 LIPID SCREENING: ICD-10-CM

## 2019-10-18 DIAGNOSIS — I10 ESSENTIAL (PRIMARY) HYPERTENSION: ICD-10-CM

## 2019-10-18 DIAGNOSIS — Z12.5 ENCOUNTER FOR PROSTATE CANCER SCREENING: ICD-10-CM

## 2019-10-18 LAB
ALBUMIN SERPL-MCNC: 4.2 G/DL (ref 3.5–5.2)
ALP BLD-CCNC: 75 U/L (ref 40–130)
ALT SERPL-CCNC: 28 U/L (ref 5–41)
ANION GAP SERPL CALCULATED.3IONS-SCNC: 18 MMOL/L (ref 7–19)
AST SERPL-CCNC: 23 U/L (ref 5–40)
BILIRUB SERPL-MCNC: 1 MG/DL (ref 0.2–1.2)
BUN BLDV-MCNC: 20 MG/DL (ref 8–23)
CALCIUM SERPL-MCNC: 9.4 MG/DL (ref 8.8–10.2)
CHLORIDE BLD-SCNC: 107 MMOL/L (ref 98–111)
CHOLESTEROL, TOTAL: 164 MG/DL (ref 160–199)
CO2: 22 MMOL/L (ref 22–29)
CREAT SERPL-MCNC: 1.1 MG/DL (ref 0.5–1.2)
GFR NON-AFRICAN AMERICAN: >60
GLUCOSE BLD-MCNC: 93 MG/DL (ref 74–109)
HDLC SERPL-MCNC: 47 MG/DL (ref 55–121)
LDL CHOLESTEROL CALCULATED: 88 MG/DL
POTASSIUM SERPL-SCNC: 4 MMOL/L (ref 3.5–5)
PROSTATE SPECIFIC ANTIGEN: 1.05 NG/ML (ref 0–4)
SODIUM BLD-SCNC: 147 MMOL/L (ref 136–145)
TOTAL PROTEIN: 7.8 G/DL (ref 6.6–8.7)
TRIGL SERPL-MCNC: 147 MG/DL (ref 0–149)

## 2019-10-25 ENCOUNTER — OFFICE VISIT (OUTPATIENT)
Dept: FAMILY MEDICINE CLINIC | Age: 64
End: 2019-10-25
Payer: COMMERCIAL

## 2019-10-25 ENCOUNTER — HOSPITAL ENCOUNTER (OUTPATIENT)
Dept: GENERAL RADIOLOGY | Age: 64
Discharge: HOME | End: 2019-10-25
Payer: COMMERCIAL

## 2019-10-25 VITALS
HEART RATE: 60 BPM | SYSTOLIC BLOOD PRESSURE: 116 MMHG | BODY MASS INDEX: 31.64 KG/M2 | TEMPERATURE: 97.6 F | WEIGHT: 202 LBS | OXYGEN SATURATION: 100 % | DIASTOLIC BLOOD PRESSURE: 88 MMHG

## 2019-10-25 DIAGNOSIS — Z00.00 ANNUAL PHYSICAL EXAM: Primary | ICD-10-CM

## 2019-10-25 DIAGNOSIS — M25.562 CHRONIC PAIN OF LEFT KNEE: ICD-10-CM

## 2019-10-25 DIAGNOSIS — G89.29 CHRONIC PAIN OF LEFT KNEE: ICD-10-CM

## 2019-10-25 DIAGNOSIS — I10 ESSENTIAL (PRIMARY) HYPERTENSION: ICD-10-CM

## 2019-10-25 DIAGNOSIS — Z23 NEED FOR INFLUENZA VACCINATION: ICD-10-CM

## 2019-10-25 PROCEDURE — G8482 FLU IMMUNIZE ORDER/ADMIN: HCPCS | Performed by: FAMILY MEDICINE

## 2019-10-25 PROCEDURE — 99396 PREV VISIT EST AGE 40-64: CPT | Performed by: FAMILY MEDICINE

## 2019-10-25 PROCEDURE — 90471 IMMUNIZATION ADMIN: CPT | Performed by: FAMILY MEDICINE

## 2019-10-25 PROCEDURE — 73562 X-RAY EXAM OF KNEE 3: CPT

## 2019-10-25 PROCEDURE — 90686 IIV4 VACC NO PRSV 0.5 ML IM: CPT | Performed by: FAMILY MEDICINE

## 2019-10-25 RX ORDER — LISINOPRIL 20 MG/1
20 TABLET ORAL DAILY
Qty: 90 TABLET | Refills: 3 | Status: SHIPPED | OUTPATIENT
Start: 2019-10-25 | End: 2020-12-22 | Stop reason: SDUPTHER

## 2019-10-25 RX ORDER — SILDENAFIL 100 MG/1
100 TABLET, FILM COATED ORAL PRN
Qty: 3 TABLET | Refills: 11 | Status: SHIPPED | OUTPATIENT
Start: 2019-10-25 | End: 2020-12-22 | Stop reason: SDUPTHER

## 2019-11-04 ENCOUNTER — HOSPITAL ENCOUNTER (OUTPATIENT)
Dept: MRI IMAGING | Age: 64
Discharge: HOME | End: 2019-11-04
Payer: COMMERCIAL

## 2019-11-04 DIAGNOSIS — M25.562 CHRONIC PAIN OF LEFT KNEE: ICD-10-CM

## 2019-11-04 DIAGNOSIS — G89.29 CHRONIC PAIN OF LEFT KNEE: ICD-10-CM

## 2019-11-04 PROCEDURE — 73721 MRI JNT OF LWR EXTRE W/O DYE: CPT

## 2019-11-05 ENCOUNTER — TELEPHONE (OUTPATIENT)
Dept: FAMILY MEDICINE CLINIC | Age: 64
End: 2019-11-05

## 2019-11-06 ENCOUNTER — TELEPHONE (OUTPATIENT)
Dept: FAMILY MEDICINE CLINIC | Age: 64
End: 2019-11-06

## 2019-11-06 DIAGNOSIS — M25.562 PAIN IN LATERAL PORTION OF LEFT KNEE: Primary | ICD-10-CM

## 2020-02-11 NOTE — H&P (VIEW-ONLY)
Chief Complaint   Patient presents with   • Colon Cancer Screening     Pt presents today for colon recall-last colon was 2/10/2017; Personal history of colon polyps; Family history of colon polyps     Subjective   HPI  Frankie Reid is a 64 y.o. male who presents as a referral for preventative maintenance. He has no complaints of nausea or vomiting. No change in bowels. No wt loss. No BRBPR. No melena. There is no family hx for colon cancer. No abdominal pain. There was no Cologuard screening this year.  The patient's last colonoscopy was performed on 2/10/2017 found to be normal.    Past Medical History:   Diagnosis Date   • ED (erectile dysfunction)    • Family history of colonic polyps    • History of colon polyps      Past Surgical History:   Procedure Laterality Date   • COLONOSCOPY N/A 2/10/2017    One 7mm sessile serated adenomatous polyp in the ascending colon; Two 5-7mm adenomatous polyps in the transverse colon; One 4mm hyperplastic polyp in the rectum; Repeat 3 years   • COLONOSCOPY  10/25/2012    One 6mm adenomatous polyp in the sigmoid colon; Repeat 4 years   • MULTIPLE TOOTH EXTRACTIONS     • TONSILLECTOMY         Current Outpatient Medications:   •  aspirin  MG tablet, Take 325 mg by mouth As Needed., Disp: , Rfl:   •  lisinopril (PRINIVIL,ZESTRIL) 20 MG tablet, Take 20 mg by mouth Daily., Disp: , Rfl:   •  Meloxicam 10 MG capsule, 1 tablet Daily., Disp: , Rfl:   •  sodium-potassium-magnesium sulfates (SUPREP BOWEL PREP KIT) 17.5-3.13-1.6 GM/177ML solution oral solution, Take 1 bottle by mouth Every 12 (Twelve) Hours. Split dose prep as directed by office instructions provided.  2 bottles = one kit., Disp: 2 bottle, Rfl: 0  Allergies   Allergen Reactions   • Codeine Nausea And Vomiting     Social History     Socioeconomic History   • Marital status:      Spouse name: Not on file   • Number of children: Not on file   • Years of education: Not on file   • Highest education level: Not on file  "  Tobacco Use   • Smoking status: Never Smoker   • Smokeless tobacco: Never Used   Substance and Sexual Activity   • Alcohol use: Yes     Comment: Occasionally   • Drug use: No     Family History   Problem Relation Age of Onset   • Colon polyps Sister    • Colon cancer Neg Hx    • Esophageal cancer Neg Hx    • Liver cancer Neg Hx    • Liver disease Neg Hx    • Rectal cancer Neg Hx    • Stomach cancer Neg Hx        REVIEW OF SYSTEMS  General: well appearing, no fever chills or sweats, no unexplained wt loss  HEENT: no acute visual or hearing disturbances  Cardiovascular: No chest pain or palpitations  Pulmonary: No shortness of breath, coughing, wheezing or hemoptysis  : No burning, urgency, hematuria, or dysuria  Musculoskeletal: No joint pain or stiffness  Peripheral: no edema  Skin: No lesions or rashes  Neuro: No dizziness, headaches, stroke, syncope  Endocrine: No hot or cold intolerances  Hematological: No blood dyscrasias    Objective   Vitals:    02/12/20 0945   BP: 126/84   BP Location: Left arm   Patient Position: Sitting   Cuff Size: Adult   Pulse: 62   Temp: 97.9 °F (36.6 °C)   TempSrc: Tympanic   SpO2: 98%   Weight: 93 kg (205 lb)   Height: 170.2 cm (67\")     Body mass index is 32.11 kg/m².    PHYSICAL EXAM  General: age appropriate well nourished well appearing, no acute distress  Head: normocephalic and atraumatic  Global assessment-supple  Neck-No JVD noted, no lymphadenopathy  Pulmonary-clear to auscultation bilaterally, normal respiratory effort  Cardiovascular-normal rate and rhythm, normal heart sounds, S1 and S2 noted  Abdomen-soft, non tender, non distended, normal bowel sounds all 4 quadrants, no hepatosplenomegaly noted  Extremities-No clubbing cyanosis or edema  Neuro-Non focal, converses appropriately, awake, alert, oriented    Imaging Results (Most Recent)     None        Assessment/Plan   Frankie was seen today for colon cancer screening.    Diagnoses and all orders for this " visit:    Encounter for colonoscopy due to history of adenomatous colonic polyps  -     Case Request; Standing  -     Case Request    Family history of polyps in the colon  -     Case Request; Standing  -     Case Request    Other orders  -     Follow Anesthesia Guidelines / Protocol; Future  -     Obtain Informed Consent; Future  -     sodium-potassium-magnesium sulfates (SUPREP BOWEL PREP KIT) 17.5-3.13-1.6 GM/177ML solution oral solution; Take 1 bottle by mouth Every 12 (Twelve) Hours. Split dose prep as directed by office instructions provided.  2 bottles = one kit.      COLONOSCOPY WITH ANESTHESIA (N/A)       Body mass index is 32.11 kg/m². Patient's Body mass index is 32.11 kg/m². BMI is above normal parameters. Recommendations include: nutrition counseling.      All risks, benefits, alternatives, and indications of colonoscopy procedure have been discussed with the patient. Risks to include perforation of the colon requiring possible surgery or colostomy, risk of bleeding from biopsies or removal of colon tissue, possibility of missing a colon polyp or cancer, or adverse drug reaction.  Benefits to include the diagnosis and management of disease of the colon and rectum. Alternatives to include barium enema, radiographic evaluation, lab testing or no intervention. Pt verbalizes understanding and agrees.

## 2020-02-12 ENCOUNTER — OFFICE VISIT (OUTPATIENT)
Dept: GASTROENTEROLOGY | Facility: CLINIC | Age: 65
End: 2020-02-12

## 2020-02-12 VITALS
HEIGHT: 67 IN | TEMPERATURE: 97.9 F | BODY MASS INDEX: 32.18 KG/M2 | HEART RATE: 62 BPM | DIASTOLIC BLOOD PRESSURE: 84 MMHG | OXYGEN SATURATION: 98 % | WEIGHT: 205 LBS | SYSTOLIC BLOOD PRESSURE: 126 MMHG

## 2020-02-12 DIAGNOSIS — Z12.11 ENCOUNTER FOR COLONOSCOPY DUE TO HISTORY OF ADENOMATOUS COLONIC POLYPS: Primary | ICD-10-CM

## 2020-02-12 DIAGNOSIS — Z86.010 ENCOUNTER FOR COLONOSCOPY DUE TO HISTORY OF ADENOMATOUS COLONIC POLYPS: Primary | ICD-10-CM

## 2020-02-12 DIAGNOSIS — Z83.71 FAMILY HISTORY OF POLYPS IN THE COLON: ICD-10-CM

## 2020-02-12 PROBLEM — Z86.0101 ENCOUNTER FOR COLONOSCOPY DUE TO HISTORY OF ADENOMATOUS COLONIC POLYPS: Status: ACTIVE | Noted: 2020-02-12

## 2020-02-12 PROBLEM — Z83.719 FAMILY HISTORY OF POLYPS IN THE COLON: Status: ACTIVE | Noted: 2020-02-12

## 2020-02-12 PROCEDURE — S0285 CNSLT BEFORE SCREEN COLONOSC: HCPCS | Performed by: NURSE PRACTITIONER

## 2020-02-12 RX ORDER — SODIUM, POTASSIUM,MAG SULFATES 17.5-3.13G
1 SOLUTION, RECONSTITUTED, ORAL ORAL EVERY 12 HOURS
Qty: 2 BOTTLE | Refills: 0 | Status: SHIPPED | OUTPATIENT
Start: 2020-02-12 | End: 2020-02-24 | Stop reason: HOSPADM

## 2020-02-24 ENCOUNTER — HOSPITAL ENCOUNTER (OUTPATIENT)
Facility: HOSPITAL | Age: 65
Setting detail: HOSPITAL OUTPATIENT SURGERY
Discharge: HOME | End: 2020-02-24
Attending: INTERNAL MEDICINE | Admitting: INTERNAL MEDICINE

## 2020-02-24 ENCOUNTER — ANESTHESIA (OUTPATIENT)
Dept: GASTROENTEROLOGY | Facility: HOSPITAL | Age: 65
End: 2020-02-24

## 2020-02-24 ENCOUNTER — ANESTHESIA EVENT (OUTPATIENT)
Dept: GASTROENTEROLOGY | Facility: HOSPITAL | Age: 65
End: 2020-02-24

## 2020-02-24 VITALS
SYSTOLIC BLOOD PRESSURE: 139 MMHG | OXYGEN SATURATION: 95 % | WEIGHT: 203 LBS | RESPIRATION RATE: 17 BRPM | TEMPERATURE: 97.5 F | BODY MASS INDEX: 31.86 KG/M2 | DIASTOLIC BLOOD PRESSURE: 90 MMHG | HEIGHT: 67 IN | HEART RATE: 56 BPM

## 2020-02-24 PROCEDURE — G0105 COLORECTAL SCRN; HI RISK IND: HCPCS | Performed by: INTERNAL MEDICINE

## 2020-02-24 PROCEDURE — 25010000002 PROPOFOL 10 MG/ML EMULSION: Performed by: NURSE ANESTHETIST, CERTIFIED REGISTERED

## 2020-02-24 RX ORDER — SODIUM CHLORIDE 9 MG/ML
100 INJECTION, SOLUTION INTRAVENOUS CONTINUOUS
Status: CANCELLED | OUTPATIENT
Start: 2020-02-24

## 2020-02-24 RX ORDER — SODIUM CHLORIDE 9 MG/ML
500 INJECTION, SOLUTION INTRAVENOUS CONTINUOUS PRN
Status: DISCONTINUED | OUTPATIENT
Start: 2020-02-24 | End: 2020-02-24 | Stop reason: HOSPADM

## 2020-02-24 RX ORDER — SODIUM CHLORIDE 0.9 % (FLUSH) 0.9 %
10 SYRINGE (ML) INJECTION AS NEEDED
Status: CANCELLED | OUTPATIENT
Start: 2020-02-24

## 2020-02-24 RX ORDER — SODIUM CHLORIDE 0.9 % (FLUSH) 0.9 %
10 SYRINGE (ML) INJECTION AS NEEDED
Status: DISCONTINUED | OUTPATIENT
Start: 2020-02-24 | End: 2020-02-24 | Stop reason: HOSPADM

## 2020-02-24 RX ORDER — PROPOFOL 10 MG/ML
VIAL (ML) INTRAVENOUS AS NEEDED
Status: DISCONTINUED | OUTPATIENT
Start: 2020-02-24 | End: 2020-02-24 | Stop reason: SURG

## 2020-02-24 RX ORDER — SODIUM CHLORIDE 0.9 % (FLUSH) 0.9 %
10 SYRINGE (ML) INJECTION EVERY 12 HOURS SCHEDULED
Status: CANCELLED | OUTPATIENT
Start: 2020-02-24

## 2020-02-24 RX ADMIN — PROPOFOL 50 MG: 10 INJECTION, EMULSION INTRAVENOUS at 13:02

## 2020-02-24 RX ADMIN — PROPOFOL 50 MG: 10 INJECTION, EMULSION INTRAVENOUS at 12:56

## 2020-02-24 RX ADMIN — PROPOFOL 100 MG: 10 INJECTION, EMULSION INTRAVENOUS at 12:44

## 2020-02-24 RX ADMIN — GLYCOPYRROLATE 0.2 MG: 0.2 INJECTION, SOLUTION INTRAMUSCULAR; INTRAVENOUS at 12:54

## 2020-02-24 RX ADMIN — PROPOFOL 50 MG: 10 INJECTION, EMULSION INTRAVENOUS at 12:49

## 2020-02-24 RX ADMIN — PROPOFOL 50 MG: 10 INJECTION, EMULSION INTRAVENOUS at 12:46

## 2020-02-24 RX ADMIN — SODIUM CHLORIDE 500 ML: 9 INJECTION, SOLUTION INTRAVENOUS at 10:41

## 2020-02-24 NOTE — ANESTHESIA POSTPROCEDURE EVALUATION
Patient: Frankie Reid    Procedure Summary     Date:  02/24/20 Room / Location:   [facility]    Anesthesia Start:  1240 Anesthesia Stop:  1310    Procedure:  COLONOSCOPY WITH ANESTHESIA (N/A ) Diagnosis:       Encounter for colonoscopy due to history of adenomatous colonic polyps      Family history of polyps in the colon      (Encounter for colonoscopy due to history of adenomatous colonic polyps [Z12.11, Z86.010])      (Family history of polyps in the colon [Z83.71])    Surgeon:  Dorothea Sales MD Provider:  Darrion Alvarez CRNA    Anesthesia Type:  MAC ASA Status:  2          Anesthesia Type: MAC    Vitals  Vitals Value Taken Time   /90 2/24/2020  1:25 PM   Temp     Pulse 52 2/24/2020  1:27 PM   Resp 17 2/24/2020  1:25 PM   SpO2 95 % 2/24/2020  1:27 PM   Vitals shown include unvalidated device data.        Post Anesthesia Care and Evaluation    Patient location during evaluation: PHASE II  Patient participation: complete - patient participated  Level of consciousness: awake and sleepy but conscious  Pain score: 0  Pain management: adequate  Airway patency: patent  Anesthetic complications: No anesthetic complications    Cardiovascular status: acceptable  Respiratory status: acceptable  Hydration status: acceptable

## 2020-02-24 NOTE — INTERVAL H&P NOTE
H&P updated. The patient was examined and the following changes are noted:        His last colonoscopy in February 2017 was not normal.  He had 3 adenomatous colon polyps removed.

## 2020-02-24 NOTE — ANESTHESIA PREPROCEDURE EVALUATION
Anesthesia Evaluation     no history of anesthetic complications:  NPO Solid Status: > 8 hours  NPO Liquid Status: > 4 hours           Airway   Mallampati: III  TM distance: >3 FB  Neck ROM: full  Dental    (+) edentulous, upper dentures and lower dentures    Pulmonary - normal exam    breath sounds clear to auscultation  (-) asthma, recent URI, sleep apnea, not a smoker  Cardiovascular - normal exam  Exercise tolerance: good (4-7 METS)    Rhythm: regular  Rate: normal    (+) hypertension,   (-) pacemaker, past MI, angina, cardiac stents, CABG      Neuro/Psych  (-) seizures, TIA, CVA  GI/Hepatic/Renal/Endo    (-) GERD, liver disease, no renal disease, diabetes, no thyroid disorder    Musculoskeletal     Abdominal    Substance History      OB/GYN          Other                        Anesthesia Plan    ASA 2     MAC     intravenous induction     Anesthetic plan, all risks, benefits, and alternatives have been provided, discussed and informed consent has been obtained with: patient.

## 2020-05-11 NOTE — PATIENT INSTRUCTIONS
What Everyone Should Know about Shingles Vaccine (Shingrix)    CONTACT YOUR INSURANCE TO SEE IF YOUR POLICY COVERS THIS VACCINE    One of the Recommended Vaccines  Shingles vaccination is the only way to protect against shingles and postherpetic neuralgia (PHN), the most common complication from shingles. CDC recommends that healthy adults 50 years and older get two doses of the shingles vaccine called Shingrix®,  by 2 to 6 months, to prevent shingles and the complications from the disease. Your doctor or pharmacist can give you Shingrix as a shot in your upper arm. Shingrix provides strong protection against shingles and PHN. Two doses of Shingrix is more than 90% effective at preventing shingles and PHN. Protection stays above 85% for at least the first four years after you get vaccinated. Shingrix is the preferred vaccine, over Zostavax®, a shingles vaccine in use since 2006. Who Should Get Shingrix? Healthy adults 50 years and older should get two doses of Shingrix,  by 2 to 6 months. You should get Shingrix even if in the past you   had shingles   received Zostavax   are not sure if you had chickenpox  Vaccine for Those 50 Years and Older  Shingrix reduces the risk of shingles and PHN by more than 90% in people 48 and older. CDC recommends the vaccine for healthy adults 48 and older. There is no maximum age for getting Shingrix. If you had shingles in the past, you can get Shingrix to help prevent future occurrences of the disease. There is no specific length of time that you need to wait after having shingles before you can receive Shingrix, but generally you should make sure the shingles rash has gone away before getting vaccinated. You can get Shingrix whether or not you remember having had chickenpox in the past. Studies show that more than 99% of Americans 40 years and older have had chickenpox, even if they dont remember having the disease.  Chickenpox and shingles are related because they are caused by the same virus (varicella zoster virus). After a person recovers from chickenpox, the virus stays dormant (inactive) in the body. It can reactivate years later and cause shingles. If you had Zostavax in the recent past, you should wait at least eight weeks before getting Shingrix. Talk to your healthcare provider to determine the best time to get Shingrix. Shingrix is available in [place] and pharmacies. If you have questions about Shingrix, talk with your healthcare provider. Who Should Not Get Shingrix? The side effects of the Shingrix are temporary, and usually last 2 to 3 days. While you may experience pain for a few days after getting Shingrix, the pain will be less severe than having shingles and the complications from the disease. You should not get Shingrix if you:   have ever had a severe allergic reaction to any component of the vaccine or after a dose of Shingrix   tested negative for immunity to varicella zoster virus. If you test negative, you should get chickenpox vaccine.  currently have shingles   currently are pregnant or breastfeeding. Women who are pregnant or breastfeeding should wait to get Shingrix. If you have a minor acute (starts suddenly) illness, such as a cold, you may get Shingrix. But if you have a moderate or severe acute illness, you should usually wait until you recover before getting the vaccine. This includes anyone with a temperature of 101.3°F or higher. How Well Does Shingrix Work? Two doses of Shingrix provides strong protection against shingles and postherpetic neuralgia (PHN), the most common complication of shingles.  In adults 48to 71years old who got two doses, Shingrix was 97% effective in preventing shingles; among adults 70 years and older, Shingrix was 91% effective.    In adults 48to 71years old who got two doses, Shingrix was 91% effective in preventing PHN; among adults 70 years and older, Shingrix was 89% [phone]. If you have any questions about side effects from Shingrix, talk with your doctor. The shingles vaccine does not contain thimerosal (a preservative containing mercury). How Can I Pay For Shingrix? There are several ways shingles vaccine may be paid for:   Medicare   Medicare Part D plans cover the shingles vaccine, but there may be a cost to you depending on your plan. There may be a copay for the vaccine, or you may need to pay in full then get reimbursed for a certain amount.  Medicare Part B does not cover the shingles vaccine. Medicaid   Medicaid may or may not cover the vaccine. Contact your insurer to find out. Private health insurance   Many private health insurance plans will cover the vaccine. Contact your insurer to find out. Vaccine assistance programs   Some pharmaceutical companies provide vaccines to eligible adults who cannot afford them. You may want to check with the vaccine , [employer], about Shingrix. yes

## 2020-11-03 ENCOUNTER — TELEPHONE (OUTPATIENT)
Dept: FAMILY MEDICINE CLINIC | Age: 65
End: 2020-11-03

## 2020-11-03 NOTE — TELEPHONE ENCOUNTER
Fausto Kothari requests that the nurse return their call. The best time to reach him is Anytime. Thank you. *Patient called to schedule an appointment for his physical w/ dr Gallo Weinstein. Patient needs a morning appt.

## 2020-12-16 DIAGNOSIS — Z00.00 ANNUAL PHYSICAL EXAM: ICD-10-CM

## 2020-12-16 LAB
ALBUMIN SERPL-MCNC: 3.9 G/DL (ref 3.5–5.2)
ALP BLD-CCNC: 77 U/L (ref 40–130)
ALT SERPL-CCNC: 25 U/L (ref 5–41)
ANION GAP SERPL CALCULATED.3IONS-SCNC: 10 MMOL/L (ref 7–19)
AST SERPL-CCNC: 20 U/L (ref 5–40)
BILIRUB SERPL-MCNC: 0.8 MG/DL (ref 0.2–1.2)
BUN BLDV-MCNC: 15 MG/DL (ref 8–23)
CALCIUM SERPL-MCNC: 8.9 MG/DL (ref 8.8–10.2)
CHLORIDE BLD-SCNC: 103 MMOL/L (ref 98–111)
CHOLESTEROL, FASTING: 170 MG/DL (ref 160–199)
CO2: 28 MMOL/L (ref 22–29)
CREAT SERPL-MCNC: 1.1 MG/DL (ref 0.5–1.2)
GFR AFRICAN AMERICAN: >59
GFR NON-AFRICAN AMERICAN: >60
GLUCOSE BLD-MCNC: 96 MG/DL (ref 74–109)
HDLC SERPL-MCNC: 35 MG/DL (ref 55–121)
LDL CHOLESTEROL CALCULATED: 90 MG/DL
POTASSIUM SERPL-SCNC: 3.7 MMOL/L (ref 3.5–5)
PROSTATE SPECIFIC ANTIGEN: 1.02 NG/ML (ref 0–4)
SODIUM BLD-SCNC: 141 MMOL/L (ref 136–145)
TOTAL PROTEIN: 7.2 G/DL (ref 6.6–8.7)
TRIGLYCERIDE, FASTING: 223 MG/DL (ref 0–149)

## 2020-12-22 ENCOUNTER — OFFICE VISIT (OUTPATIENT)
Dept: FAMILY MEDICINE CLINIC | Age: 65
End: 2020-12-22
Payer: COMMERCIAL

## 2020-12-22 VITALS
WEIGHT: 207 LBS | TEMPERATURE: 96.8 F | SYSTOLIC BLOOD PRESSURE: 136 MMHG | HEIGHT: 67 IN | BODY MASS INDEX: 32.49 KG/M2 | OXYGEN SATURATION: 98 % | DIASTOLIC BLOOD PRESSURE: 82 MMHG | HEART RATE: 65 BPM

## 2020-12-22 PROCEDURE — 90686 IIV4 VACC NO PRSV 0.5 ML IM: CPT | Performed by: FAMILY MEDICINE

## 2020-12-22 PROCEDURE — 90471 IMMUNIZATION ADMIN: CPT | Performed by: FAMILY MEDICINE

## 2020-12-22 PROCEDURE — G8482 FLU IMMUNIZE ORDER/ADMIN: HCPCS | Performed by: FAMILY MEDICINE

## 2020-12-22 PROCEDURE — 99396 PREV VISIT EST AGE 40-64: CPT | Performed by: FAMILY MEDICINE

## 2020-12-22 RX ORDER — LISINOPRIL 20 MG/1
20 TABLET ORAL DAILY
Qty: 90 TABLET | Refills: 3 | Status: SHIPPED | OUTPATIENT
Start: 2020-12-22 | End: 2022-01-31

## 2020-12-22 RX ORDER — SILDENAFIL 100 MG/1
100 TABLET, FILM COATED ORAL PRN
Qty: 3 TABLET | Refills: 11 | Status: SHIPPED | OUTPATIENT
Start: 2020-12-22 | End: 2021-04-05

## 2020-12-22 RX ORDER — MELOXICAM 15 MG/1
TABLET ORAL
Qty: 30 TABLET | Refills: 5 | Status: SHIPPED | OUTPATIENT
Start: 2020-12-22 | End: 2021-05-03 | Stop reason: ALTCHOICE

## 2020-12-22 SDOH — ECONOMIC STABILITY: TRANSPORTATION INSECURITY
IN THE PAST 12 MONTHS, HAS LACK OF TRANSPORTATION KEPT YOU FROM MEETINGS, WORK, OR FROM GETTING THINGS NEEDED FOR DAILY LIVING?: NO

## 2020-12-22 SDOH — ECONOMIC STABILITY: INCOME INSECURITY: HOW HARD IS IT FOR YOU TO PAY FOR THE VERY BASICS LIKE FOOD, HOUSING, MEDICAL CARE, AND HEATING?: NOT HARD AT ALL

## 2020-12-22 SDOH — ECONOMIC STABILITY: FOOD INSECURITY: WITHIN THE PAST 12 MONTHS, THE FOOD YOU BOUGHT JUST DIDN'T LAST AND YOU DIDN'T HAVE MONEY TO GET MORE.: NEVER TRUE

## 2020-12-22 SDOH — ECONOMIC STABILITY: TRANSPORTATION INSECURITY
IN THE PAST 12 MONTHS, HAS THE LACK OF TRANSPORTATION KEPT YOU FROM MEDICAL APPOINTMENTS OR FROM GETTING MEDICATIONS?: NO

## 2020-12-22 SDOH — ECONOMIC STABILITY: FOOD INSECURITY: WITHIN THE PAST 12 MONTHS, YOU WORRIED THAT YOUR FOOD WOULD RUN OUT BEFORE YOU GOT MONEY TO BUY MORE.: NEVER TRUE

## 2020-12-22 ASSESSMENT — PATIENT HEALTH QUESTIONNAIRE - PHQ9
SUM OF ALL RESPONSES TO PHQ QUESTIONS 1-9: 0
1. LITTLE INTEREST OR PLEASURE IN DOING THINGS: 0
SUM OF ALL RESPONSES TO PHQ QUESTIONS 1-9: 0
SUM OF ALL RESPONSES TO PHQ9 QUESTIONS 1 & 2: 0
SUM OF ALL RESPONSES TO PHQ QUESTIONS 1-9: 0
2. FEELING DOWN, DEPRESSED OR HOPELESS: 0

## 2020-12-22 NOTE — PROGRESS NOTES
[facility] PHYSICIAN SERVICES  [facility] FAMILY MEDICINE  [address] [facility] [address] [address]  Dept: [phone]  Dept Fax: [phone]  Loc: [phone]    Jd Hurley is a 63 y.o. male who pre I got some pains in the asked coming in today sents today for his medical conditions/complaints as noted below. Jd Hurley is here for Annual Exam        HPI:   CC: Here today to discuss the following:    Hypertension  Compliant with medications. No adverse effects from medication. No lightheadedness, palpitations, or chest pain. Erectile Dysfunction  Satisfied with current mediation. No adverse effects. Hyperlipidemia  Tolerating current cholesterol medication without side effects. No body aches. Attempting to reduce processed sugar and cholesterol from diet. HPI    Past Medical History:   Diagnosis Date    Hypertension     Joint pain       Past Surgical History:   Procedure Laterality Date    TONSILLECTOMY         Family History   Problem Relation Age of Onset    Diabetes Mother     High Blood Pressure Mother     Heart Disease Father        Social History     Tobacco Use    Smoking status: Never Smoker    Smokeless tobacco: Never Used   Substance Use Topics    Alcohol use: Yes     Current Outpatient Medications   Medication Sig Dispense Refill    lisinopril (PRINIVIL;ZESTRIL) 20 MG tablet Take 1 tablet by mouth daily 90 tablet 3    meloxicam (MOBIC) 15 MG tablet TAKE 1 TABLET BY MOUTH ONCE DAILY.  30 tablet 5    sildenafil (VIAGRA) 100 MG tablet Take 1 tablet by mouth as needed for Erectile Dysfunction 3 tablet 11    albuterol sulfate  (90 Base) MCG/ACT inhaler Inhale 2 puffs into the lungs every 6 hours as needed for Wheezing 1 Inhaler 0    montelukast (SINGULAIR) 10 MG tablet Take 1 tablet by mouth daily (Patient taking differently: Take 10 mg by mouth as needed ) 30 tablet 3    aspirin 325 MG EC tablet Take 325 mg by mouth daily No current facility-administered medications for this visit. Allergies   Allergen Reactions    Codeine        Health Maintenance   Topic Date Due    Hepatitis C screen  1955    HIV screen  02/17/1972    Colon cancer screen colonoscopy  02/20/2020    Potassium monitoring  12/16/2021    Creatinine monitoring  12/16/2021    Lipid screen  12/16/2025    DTaP/Tdap/Td vaccine (2 - Td) 04/12/2028    Flu vaccine  Completed    Shingles Vaccine  Completed    Hepatitis A vaccine  Aged Out    Hepatitis B vaccine  Aged Out    Hib vaccine  Aged Out    Meningococcal (ACWY) vaccine  Aged Out    Pneumococcal 0-64 years Vaccine  Aged Out       Subjective:      Review of Systems  Review of Systems   Constitutional: Negative for chills and fever. HENT: Negative for congestion. Respiratory: Negative for cough, chest tightness and shortness of breath. Cardiovascular: Negative for chest pain, palpitations and leg swelling. Gastrointestinal: Negative for abdominal pain, anal bleeding, constipation, diarrhea and nausea. Genitourinary: Negative for difficulty urinating. Psychiatric/Behavioral: Negative. SeeHPI for visit specific review of symptoms. All others negative      Objective:   /82   Pulse 65   Temp 96.8 °F (36 °C)   Ht 5' 7\" (1.702 m)   Wt 207 lb (93.9 kg)   SpO2 98%   BMI 32.42 kg/m²   Physical Exam    Physical Exam   Constitutional: He appears well-developed. Does not appear ill. Eyes: Pupils are equal, round, and reactive to light. Conjunctiva and Lids normal.  Neck: Normal range of motion. Neck supple. No masses. Neck Symmetric. Normal tracheal position. No thyroid enlargement  Cardiovascular: Normal rate and regular rhythm. Exam reveals no friction rub. Carotid arteries: no bruit observed. No murmur heard. Respiratory:  Effort normal and breath sounds normal. No respiratory distress. No wheezes. No rales. No use of accessory muscles or intercostal retractions. PSA 0.96 10/16/2018     Stable  2. Essential (primary) hypertension  BP Readings from Last 3 Encounters:   12/22/20 136/82   10/25/19 116/88   04/23/19 118/86     Blood pressure stable  - lisinopril (PRINIVIL;ZESTRIL) 20 MG tablet; Take 1 tablet by mouth daily  Dispense: 90 tablet; Refill: 3    3. Chronic pain of right knee  Refill of meloxicam  - meloxicam (MOBIC) 15 MG tablet; TAKE 1 TABLET BY MOUTH ONCE DAILY. Dispense: 30 tablet; Refill: 5    4. Need for influenza vaccination    - INFLUENZA, QUADV, 3 YRS AND OLDER, IM PF, PREFILL SYR OR SDV, 0.5ML (AFLURIA QUADV, PF)    5. Erectile dysfunction, unspecified erectile dysfunction type  Refilled Viagra  - sildenafil (VIAGRA) 100 MG tablet; Take 1 tablet by mouth as needed for Erectile Dysfunction  Dispense: 3 tablet; Refill: 11    6. Hypercholesterolemia  Lab Results   Component Value Date    CHOL 164 10/18/2019    CHOL 162 10/16/2018    CHOL 184 10/05/2017     Lab Results   Component Value Date    TRIG 147 10/18/2019    TRIG 173 (H) 10/16/2018    TRIG 171 10/05/2017     Lab Results   Component Value Date    HDL 35 (L) 12/16/2020    HDL 47 (L) 10/18/2019    HDL 45 (L) 10/16/2018     Lab Results   Component Value Date    LDLCALC 90 12/16/2020    LDLCALC 88 10/18/2019    [address] 82 10/16/2018     No results found for: LABVLDL, VLDL  No results found for: CHOLHDLRATIO    - Comprehensive Metabolic Panel; Future  - Lipid Panel; Future            Return in about 6 months (around 6/22/2021) for Routine follow up - 15 minute visit. Discussed use, benefit, and side effects of prescribed medications. All patient questions answered. Pt voiced understanding. Reviewed health maintenance. Instructedto continue current medications, diet and exercise. Patient agreed with treatmentplan. Follow up as directed. Note dictated using Dragon Dictation software  Sometimes this dictation software makes erroneous transcriptions.

## 2020-12-22 NOTE — PROGRESS NOTES
Vaccine Information Sheet, \"Influenza - Inactivated\" OR \"Live - Intranasal\"  given to Qamar Reyes, or parent/legal guardian of  Qamar Reyes and verbalized understanding. Patient responses:    Have you ever had a reaction to a flu vaccine? No  Are you able to eat eggs without adverse effects? Yes  Do you have any current illness? No  Have you ever had [place] Syndrome? No    Flu vaccine given per order. Please see immunization tab.

## 2021-04-05 ENCOUNTER — APPOINTMENT (OUTPATIENT)
Dept: GENERAL RADIOLOGY | Age: 66
End: 2021-04-05
Payer: COMMERCIAL

## 2021-04-05 ENCOUNTER — NURSE TRIAGE (OUTPATIENT)
Dept: OTHER | Facility: CLINIC | Age: 66
End: 2021-04-05

## 2021-04-05 ENCOUNTER — APPOINTMENT (OUTPATIENT)
Dept: CT IMAGING | Age: 66
End: 2021-04-05
Payer: COMMERCIAL

## 2021-04-05 ENCOUNTER — HOSPITAL ENCOUNTER (EMERGENCY)
Age: 66
Discharge: HOME | End: 2021-04-05
Attending: EMERGENCY MEDICINE
Payer: COMMERCIAL

## 2021-04-05 VITALS
BODY MASS INDEX: 32.18 KG/M2 | HEIGHT: 67 IN | OXYGEN SATURATION: 96 % | HEART RATE: 65 BPM | SYSTOLIC BLOOD PRESSURE: 144 MMHG | DIASTOLIC BLOOD PRESSURE: 77 MMHG | TEMPERATURE: 97 F | WEIGHT: 205 LBS | RESPIRATION RATE: 20 BRPM

## 2021-04-05 DIAGNOSIS — R93.2 ABNORMAL CT OF LIVER: ICD-10-CM

## 2021-04-05 DIAGNOSIS — R07.9 CHEST PAIN, UNSPECIFIED TYPE: Primary | ICD-10-CM

## 2021-04-05 LAB
ALBUMIN SERPL-MCNC: 3.6 G/DL (ref 3.5–5.2)
ALP BLD-CCNC: 77 U/L (ref 40–130)
ALT SERPL-CCNC: 20 U/L (ref 5–41)
ANION GAP SERPL CALCULATED.3IONS-SCNC: 8 MMOL/L (ref 7–19)
AST SERPL-CCNC: 20 U/L (ref 5–40)
BASOPHILS ABSOLUTE: 0 K/UL (ref 0–0.2)
BASOPHILS RELATIVE PERCENT: 0.5 % (ref 0–1)
BILIRUB SERPL-MCNC: 0.6 MG/DL (ref 0.2–1.2)
BUN BLDV-MCNC: 15 MG/DL (ref 8–23)
CALCIUM SERPL-MCNC: 9.3 MG/DL (ref 8.8–10.2)
CHLORIDE BLD-SCNC: 103 MMOL/L (ref 98–111)
CO2: 25 MMOL/L (ref 22–29)
CREAT SERPL-MCNC: 1.1 MG/DL (ref 0.5–1.2)
D DIMER: 1.47 UG/ML FEU (ref 0–0.48)
EOSINOPHILS ABSOLUTE: 0.1 K/UL (ref 0–0.6)
EOSINOPHILS RELATIVE PERCENT: 1.6 % (ref 0–5)
GFR AFRICAN AMERICAN: >59
GFR NON-AFRICAN AMERICAN: >60
GLUCOSE BLD-MCNC: 125 MG/DL (ref 74–109)
HCT VFR BLD CALC: 46.7 % (ref 42–52)
HEMOGLOBIN: 15.9 G/DL (ref 14–18)
IMMATURE GRANULOCYTES #: 0.1 K/UL
INR BLD: 1.03 (ref 0.88–1.18)
LIPASE: 58 U/L (ref 13–60)
LYMPHOCYTES ABSOLUTE: 2.5 K/UL (ref 1.1–4.5)
LYMPHOCYTES RELATIVE PERCENT: 30 % (ref 20–40)
MCH RBC QN AUTO: 33.1 PG (ref 27–31)
MCHC RBC AUTO-ENTMCNC: 34 G/DL (ref 33–37)
MCV RBC AUTO: 97.1 FL (ref 80–94)
MONOCYTES ABSOLUTE: 0.7 K/UL (ref 0–0.9)
MONOCYTES RELATIVE PERCENT: 8.7 % (ref 0–10)
NEUTROPHILS ABSOLUTE: 4.8 K/UL (ref 1.5–7.5)
NEUTROPHILS RELATIVE PERCENT: 58.6 % (ref 50–65)
PDW BLD-RTO: 12.2 % (ref 11.5–14.5)
PLATELET # BLD: 125 K/UL (ref 130–400)
PMV BLD AUTO: 11.9 FL (ref 9.4–12.4)
POTASSIUM REFLEX MAGNESIUM: 3.8 MMOL/L (ref 3.5–5)
PRO-BNP: 65 PG/ML (ref 0–900)
PROTHROMBIN TIME: 13.4 SEC (ref 12–14.6)
RBC # BLD: 4.81 M/UL (ref 4.7–6.1)
SODIUM BLD-SCNC: 136 MMOL/L (ref 136–145)
TOTAL PROTEIN: 7.1 G/DL (ref 6.6–8.7)
TROPONIN: <0.01 NG/ML (ref 0–0.03)
TROPONIN: <0.01 NG/ML (ref 0–0.03)
WBC # BLD: 8.2 K/UL (ref 4.8–10.8)

## 2021-04-05 PROCEDURE — 85025 COMPLETE CBC W/AUTO DIFF WBC: CPT

## 2021-04-05 PROCEDURE — 85610 PROTHROMBIN TIME: CPT

## 2021-04-05 PROCEDURE — 83880 ASSAY OF NATRIURETIC PEPTIDE: CPT

## 2021-04-05 PROCEDURE — 93005 ELECTROCARDIOGRAM TRACING: CPT | Performed by: NURSE PRACTITIONER

## 2021-04-05 PROCEDURE — 85379 FIBRIN DEGRADATION QUANT: CPT

## 2021-04-05 PROCEDURE — 80053 COMPREHEN METABOLIC PANEL: CPT

## 2021-04-05 PROCEDURE — 96374 THER/PROPH/DIAG INJ IV PUSH: CPT

## 2021-04-05 PROCEDURE — 2580000003 HC RX 258: Performed by: NURSE PRACTITIONER

## 2021-04-05 PROCEDURE — 99283 EMERGENCY DEPT VISIT LOW MDM: CPT

## 2021-04-05 PROCEDURE — 93005 ELECTROCARDIOGRAM TRACING: CPT | Performed by: EMERGENCY MEDICINE

## 2021-04-05 PROCEDURE — 71275 CT ANGIOGRAPHY CHEST: CPT

## 2021-04-05 PROCEDURE — 83690 ASSAY OF LIPASE: CPT

## 2021-04-05 PROCEDURE — 6360000004 HC RX CONTRAST MEDICATION: Performed by: NURSE PRACTITIONER

## 2021-04-05 PROCEDURE — 71045 X-RAY EXAM CHEST 1 VIEW: CPT

## 2021-04-05 PROCEDURE — 36415 COLL VENOUS BLD VENIPUNCTURE: CPT

## 2021-04-05 PROCEDURE — 84484 ASSAY OF TROPONIN QUANT: CPT

## 2021-04-05 RX ORDER — FUROSEMIDE 10 MG/ML
40 INJECTION INTRAMUSCULAR; INTRAVENOUS ONCE
Status: DISCONTINUED | OUTPATIENT
Start: 2021-04-05 | End: 2021-04-05

## 2021-04-05 RX ORDER — 0.9 % SODIUM CHLORIDE 0.9 %
1000 INTRAVENOUS SOLUTION INTRAVENOUS ONCE
Status: COMPLETED | OUTPATIENT
Start: 2021-04-05 | End: 2021-04-05

## 2021-04-05 RX ADMIN — SODIUM CHLORIDE 1000 ML: 9 INJECTION, SOLUTION INTRAVENOUS at 16:42

## 2021-04-05 RX ADMIN — IOPAMIDOL 90 ML: 755 INJECTION, SOLUTION INTRAVENOUS at 16:38

## 2021-04-05 ASSESSMENT — HEART SCORE: ECG: 0

## 2021-04-05 ASSESSMENT — ENCOUNTER SYMPTOMS
SHORTNESS OF BREATH: 0
VOMITING: 0

## 2021-04-05 NOTE — TELEPHONE ENCOUNTER
Received call from Sidney at pre-service center [facility]) [facility] with Red Flag Complaint. Brief description of triage: chest pain     Triage indicates for patient to go to [facility] NOW or to office with PCP approval calling PCP office to have them advise PCP office advised to go to ED for evaluation spoke with patient who advised he was on his way     Care advice provided, patient verbalizes understanding; denies any other questions or concerns; instructed to call back for any new or worsening symptoms. W    Attention Provider: Thank you for allowing me to participate in the care of your patient. The patient was connected to triage in response to information provided to the [facility]. Please do not respond through this encounter as the response is not directed to a shared pool. Reason for Disposition   Chest pain lasting longer than 5 minutes and occurred in last 3 days (72 hours) (Exception: feels exactly the same as previously diagnosed heartburn and has accompanying sour taste in mouth)    Answer Assessment - Initial Assessment Questions  1. LOCATION: \"Where does it hurt? \"        Started in the upper part of stomach below breast bond and moved up center of the chest     2. RADIATION: \"Does the pain go anywhere else? \" (e.g., into neck, jaw, arms, back)      No radiation     3. ONSET: \"When did the chest pain begin? \" (Minutes, hours or days)       Friday morning and went all the way to yesterday when he was finally michael to eat something and felt a little better he took two to three bites and nothing taste well   4. PATTERN \"Does the pain come and go, or has it been constant since it started? \"  \"Does it get worse with exertion? \"       It was constant pain it would at times not be as bad would drop down to a one or a two, yesterday afternoon he noted to be real weak and have hot flashes that the only thing he noticed that was different     5.  DURATION: \"How long does it last\" (e.g., seconds, minutes, hours) Days and only eased off a little but never went away until yesterday afternoon at 6pm and felt like it may start back up again today     6. SEVERITY: \"How bad is the pain? \"  (e.g., Scale 1-10; mild, moderate, or severe)     - MILD (1-3): doesn't interfere with normal activities      - MODERATE (4-7): interferes with normal activities or awakens from sleep     - SEVERE (8-10): excruciating pain, unable to do any normal activities        5 or 6 out of 10     7. CARDIAC RISK FACTORS: \"Do you have any history of heart problems or risk factors for heart disease? \" (e.g., angina, prior heart attack; diabetes, high blood pressure, high cholesterol, smoker, or strong family history of heart disease)      HTN, non smoker , no angina or prior MI or DM his sister does have a pace maker and his dad had heart problems     8. PULMONARY RISK FACTORS: \"Do you have any history of lung disease? \"  (e.g., blood clots in lung, asthma, emphysema, birth control pills)      He does have a little asthma maybe at times shortness of breath is noted     9. CAUSE: \"What do you think is causing the chest pain? \"      He turned his dentures in to be fixed he has not eaten properly for four days and he thinks this is why but not sure     10. OTHER SYMPTOMS: \"Do you have any other symptoms? \" (e.g., dizziness, nausea, vomiting, sweating, fever, difficulty breathing, cough)        No to all above     11. PREGNANCY: \"Is there any chance you are pregnant? \" \"When was your last menstrual period? \"        N/A    Protocols used: CHEST PAIN-ADULT-[place]

## 2021-04-05 NOTE — ED PROVIDER NOTES
[facility] EMERG[facility]Y DE[facility]  eMERGENCY dEPARTMENT eNCOUnter      Pt Name: Sana Montano  MRN: [id number]  [place] 1955  Date of evaluation: 4/5/2021  Provider: Cassell Fleischer, [address]       Chief Complaint   Patient presents with    Chest Pain     over the weekend; denies pain at this time         HISTORY OF PRESENT ILLNESS   (Location/Symptom, Timing/Onset,Context/Setting, Quality, Duration, Modifying Factors, Severity)  Note limiting factors. Sana Montano is a 61 y.o. male who presents to the emergency department WITH CHEST PAIN THAT STARTED Friday AND WAS PRESENT OVER the weekend. Better today but his doctor could not get him in so he was told to come over. NO cardiac history. No family history. Worse with activity but no pain now. midsternal  No radiation. No shortness of breath     The history is provided by the patient. Chest Pain  Pain location:  Substernal area  Pain quality: aching and stabbing    Pain radiates to:  Does not radiate  Pain severity:  Moderate  Onset quality:  Sudden  Duration:  3 days  Timing:  Intermittent  Progression:  Partially resolved  Context: at rest    Context: not breathing    Relieved by:  Nothing  Ineffective treatments:  None tried  Associated symptoms: no fever, no shortness of breath, no vomiting and no weakness    Risk factors: hypertension, male sex and obesity    Risk factors: no coronary artery disease, no diabetes mellitus and no smoking        NursingNotes were reviewed. REVIEW OF SYSTEMS    (2-9 systems for level 4, 10 or more for level 5)     Review of Systems   Constitutional: Negative for fever. Respiratory: Negative for shortness of breath. Cardiovascular: Positive for chest pain. Gastrointestinal: Negative for vomiting. Neurological: Negative for weakness. Except as noted above the remainder of the review of systems was reviewed and negative.        PAST MEDICAL HISTORY     Past Medical History:   Diagnosis Date    Hypertension abused: None     Physically abused: None     Forced sexual activity: None   Other Topics Concern    None   Social History Narrative    None       SCREENINGS      @FLOW([id number])@      PHYSICAL EXAM    (up to 7 for level 4, 8 or more for level 5)     ED Triage Vitals [04/05/21 1410]   BP Temp Temp src Pulse Resp SpO2 Height Weight   (!) 163/83 97 °F (36.1 °C) -- 68 20 95 % 5' 7\" (1.702 m) 205 lb (93 kg)       Physical Exam  Vitals signs and nursing note reviewed. Constitutional:       Appearance: He is well-developed. HENT:      Head: Normocephalic and atraumatic. Eyes:      General: No scleral icterus. Right eye: No discharge. Left eye: No discharge. Neck:      Musculoskeletal: Normal range of motion and neck supple. Cardiovascular:      Rate and Rhythm: Normal rate and regular rhythm. Pulmonary:      Effort: Pulmonary effort is normal. No respiratory distress. Breath sounds: Normal breath sounds. Abdominal:      Palpations: Abdomen is soft. Tenderness: There is no abdominal tenderness. Skin:     General: Skin is warm and dry. Capillary Refill: Capillary refill takes less than 2 seconds. Neurological:      Mental Status: He is alert and oriented to person, place, and time. Psychiatric:         Behavior: Behavior normal.         DIAGNOSTIC RESULTS     EKG: All EKG's are interpreted by the Emergency Department Physician who either signs or Co-signsthis chart in the absence of a cardiologist.  68 sinus rhythm minimal ST depressions in lateral leads read at 1415 by Dr. Kirsty Alston:   Ladoris Ripa such as CT, Ultrasound and MRI are read by the radiologist. Plain radiographic images are visualized and preliminarily interpreted by the emergency physician with the below findings:      Interpretation per the Radiologist below, if available at the time of this note:    CTA PULMONARY W CONTRAST   Final Result   1. No evidence of pulmonary embolus.    2. Myocardial megaly was a septation of the lungs. Consider fluid   overload. 3.  Nodular appearance of the liver, concerning for cirrhosis. .   Signed by Dr Reena Castrejon on 4/5/2021 4:50 PM      XR CHEST PORTABLE   Final Result   Portable chest x-ray demonstrates no active disease. Signed by Dr Leeanne Maurice on 4/5/2021 3:31 PM            ED BEDSIDEULTRASOUND:   Performed by ED Physician -none    LABS:  Labs Reviewed   CBC WITH AUTO DIFFERENTIAL - Abnormal; Notable for the following components:       Result Value    MCV 97.1 (*)     MCH 33.1 (*)     Platelets 616 (*)     All other components within normal limits   COMPREHENSIVE METABOLIC PANEL W/ REFLEX TO MG FOR LOW K - Abnormal; Notable for the following components:    Glucose 125 (*)     All other components within normal limits   D-DIMER, QUANTITATIVE - Abnormal; Notable for the following components:    D-Dimer, Quant 1.47 (*)     All other components within normal limits   TROPONIN   PROTIME-INR   BRAIN NATRIURETIC PEPTIDE   TROPONIN   LIPASE       All other labs were within normal range or not returned as of this dictation. EMERGENCY DEPARTMENT COURSE and DIFFERENTIALDIAGNOSIS/MDM:   Vitals:    Vitals:    04/05/21 1410 04/05/21 1507   BP: (!) 163/83 (!) 144/77   Pulse: 68 65   Resp: 20    Temp: 97 °F (36.1 °C)    SpO2: 95% 96%   Weight: 205 lb (93 kg)    Height: 5' 7\" (1.702 m)            MDM  Waiting on repeats. Pt left in care of Dr Gopal Lemos      CONSULTS:  None    PROCEDURES:  Unless otherwise noted below, none     Procedures    FINAL IMPRESSION      1. Chest pain, unspecified type    2.  Abnormal CT of liver        DISPOSITION/PLAN   DISPOSITION Decision To Discharge 04/05/2021 06:04:36 PM      PATIENT REFERRED TO:  Mark Small MD  [address]    Schedule an appointment as soon as possible for a visit in 2 days  call to be seen;discuss Ultrasound of liver and also stress test

## 2021-04-06 LAB
EKG P AXIS: 28 DEGREES
EKG P AXIS: 42 DEGREES
EKG P-R INTERVAL: 150 MS
EKG P-R INTERVAL: 166 MS
EKG Q-T INTERVAL: 382 MS
EKG Q-T INTERVAL: 422 MS
EKG QRS DURATION: 104 MS
EKG QRS DURATION: 104 MS
EKG QTC CALCULATION (BAZETT): 394 MS
EKG QTC CALCULATION (BAZETT): 408 MS
EKG T AXIS: -5 DEGREES
EKG T AXIS: 76 DEGREES

## 2021-04-06 PROCEDURE — 93010 ELECTROCARDIOGRAM REPORT: CPT | Performed by: INTERNAL MEDICINE

## 2021-04-09 ENCOUNTER — OFFICE VISIT (OUTPATIENT)
Dept: FAMILY MEDICINE CLINIC | Age: 66
End: 2021-04-09
Payer: COMMERCIAL

## 2021-04-09 VITALS
SYSTOLIC BLOOD PRESSURE: 136 MMHG | HEIGHT: 67 IN | DIASTOLIC BLOOD PRESSURE: 88 MMHG | TEMPERATURE: 97.6 F | BODY MASS INDEX: 31.71 KG/M2 | HEART RATE: 60 BPM | RESPIRATION RATE: 18 BRPM | WEIGHT: 202 LBS | OXYGEN SATURATION: 96 %

## 2021-04-09 DIAGNOSIS — K21.9 GASTROESOPHAGEAL REFLUX DISEASE WITHOUT ESOPHAGITIS: ICD-10-CM

## 2021-04-09 DIAGNOSIS — K74.60 CIRRHOSIS OF LIVER WITHOUT ASCITES, UNSPECIFIED HEPATIC CIRRHOSIS TYPE (HCC): Primary | ICD-10-CM

## 2021-04-09 PROCEDURE — G8427 DOCREV CUR MEDS BY ELIG CLIN: HCPCS | Performed by: NURSE PRACTITIONER

## 2021-04-09 PROCEDURE — 99214 OFFICE O/P EST MOD 30 MIN: CPT | Performed by: NURSE PRACTITIONER

## 2021-04-09 PROCEDURE — 3017F COLORECTAL CA SCREEN DOC REV: CPT | Performed by: NURSE PRACTITIONER

## 2021-04-09 PROCEDURE — 1036F TOBACCO NON-USER: CPT | Performed by: NURSE PRACTITIONER

## 2021-04-09 PROCEDURE — G8417 CALC BMI ABV UP PARAM F/U: HCPCS | Performed by: NURSE PRACTITIONER

## 2021-04-09 RX ORDER — OMEPRAZOLE 20 MG/1
20 CAPSULE, DELAYED RELEASE ORAL DAILY PRN
Qty: 30 CAPSULE | Refills: 2 | Status: SHIPPED | OUTPATIENT
Start: 2021-04-09 | End: 2022-07-15 | Stop reason: SDUPTHER

## 2021-04-09 ASSESSMENT — ENCOUNTER SYMPTOMS
SHORTNESS OF BREATH: 0
DIARRHEA: 0
WHEEZING: 0
NAUSEA: 0
CHEST TIGHTNESS: 0
COUGH: 0
ABDOMINAL PAIN: 0
SORE THROAT: 0

## 2021-04-09 NOTE — PROGRESS NOTES
Laura Villeda is a 61 y.o. male who presents today for  Chief Complaint   Patient presents with    Follow-up       HPI:  He was seen in [facility] ER on 4/5 with BRANDEE/chest pain. Symptoms started 4/2. He had pain in the BRANDEE region that radiated to mid chest intermittently. No sob. No clear pattern or exacerbating factors. Cardiac w/u was negative. D-dimer was elevated. CTA chest was negative for PE but showed nodular changes of the liver concerning for cirrhosis. Liver enzymes were normal.    He states he drank in the past, 3/5 whiskey per week and a case of beer per week a few years ago regularly. He gradually backed off and has stopped drinking completely since in ER. No n/v. He has had decreased appetite that has persisted. No bowel changes. Followed by Dr. Mitali Garrison. Review of Systems   Constitutional: Positive for appetite change (decreased). Negative for chills and fever. HENT: Negative for congestion, ear pain and sore throat. Respiratory: Negative for cough, chest tightness, shortness of breath and wheezing. Cardiovascular: Positive for chest pain (BRANDEE). Gastrointestinal: Negative for abdominal pain, diarrhea and nausea. Musculoskeletal: Negative for arthralgias and myalgias. Skin: Negative for rash. Past Medical History:   Diagnosis Date    Hypertension     Joint pain        Current Outpatient Medications   Medication Sig Dispense Refill    omeprazole (PRILOSEC) 20 MG delayed release capsule Take 1 capsule by mouth daily as needed (GERD) 30 capsule 2    lisinopril (PRINIVIL;ZESTRIL) 20 MG tablet Take 1 tablet by mouth daily 90 tablet 3    albuterol sulfate  (90 Base) MCG/ACT inhaler Inhale 2 puffs into the lungs every 6 hours as needed for Wheezing 1 Inhaler 0    meloxicam (MOBIC) 15 MG tablet TAKE 1 TABLET BY MOUTH ONCE DAILY.  (Patient not taking: Reported on 4/9/2021) 30 tablet 5    aspirin 325 MG EC tablet Take 325 mg by mouth daily       No current facility-administered medications for this visit. Allergies   Allergen Reactions    Codeine        Past Surgical History:   Procedure Laterality Date    TONSILLECTOMY         Social History     Tobacco Use    Smoking status: Never Smoker    Smokeless tobacco: Never Used   Substance Use Topics    Alcohol use: Yes    Drug use: No       Family History   Problem Relation Age of Onset    Diabetes Mother     High Blood Pressure Mother     Heart Disease Father        /88   Pulse 60   Temp 97.6 °F (36.4 °C) (Temporal)   Resp 18   Ht 5' 7\" (1.702 m)   Wt 202 lb (91.6 kg)   SpO2 96%   BMI 31.64 kg/m²     Physical Exam  Vitals signs reviewed. Constitutional:       General: He is not in acute distress. Appearance: Normal appearance. He is well-developed. HENT:      Head: Normocephalic. Eyes:      Conjunctiva/sclera: Conjunctivae normal.      Pupils: Pupils are equal, round, and reactive to light. Neck:      Musculoskeletal: Normal range of motion and neck supple. Thyroid: No thyromegaly. Vascular: No carotid bruit or JVD. Trachea: No tracheal deviation. Cardiovascular:      Rate and Rhythm: Normal rate and regular rhythm. Heart sounds: Normal heart sounds. No murmur. Pulmonary:      Effort: Pulmonary effort is normal. No respiratory distress. Breath sounds: Normal breath sounds. Abdominal:      General: Abdomen is flat. Bowel sounds are normal. There is no distension. Palpations: Abdomen is soft. There is no mass. Tenderness: There is no abdominal tenderness. There is no guarding or rebound. Hernia: No hernia is present. Musculoskeletal: Normal range of motion. Lymphadenopathy:      Cervical: No cervical adenopathy. Skin:     General: Skin is warm and dry. Findings: No rash. Neurological:      Mental Status: He is alert.    Psychiatric:         Mood and Affect: Mood normal.         Behavior: Behavior normal.         Thought Content: Thought content normal.         ASSESSMENT/PLAN:  1. Cirrhosis of liver without ascites, unspecified hepatic cirrhosis type ([facility]  -US liver  -Refer back to Dr. Dayo Beverly for evaluation and recommendations. -Advised to continue to abstain from alcohol.  - US LIVER; Future  - External Referral To Gastroenterology    2. Gastroesophageal reflux disease without esophagitis  -Start omeprazole 20 mg daily prn. Advised he can take once daily for the next couple of weeks to see if improved. He takes meloxicam for joint pain but not daily. He may take prn but try to limit use until seen by GI. Return for as scheduled. Edith Haley was seen today for follow-up. Diagnoses and all orders for this visit:    Cirrhosis of liver without ascites, unspecified hepatic cirrhosis type ([facility] [address]  -     US LIVER; Future  -     External Referral To Gastroenterology    Gastroesophageal reflux disease without esophagitis    Other orders  -     omeprazole (PRILOSEC) 20 MG delayed release capsule; Take 1 capsule by mouth daily as needed (GERD)      There are no discontinued medications. There are no Patient Instructions on file for this visit. Patient voicesunderstanding and agrees to plans along with risks and benefits of plan. Counseling:  Roc Delarosa's case, medications and options were discussed in detail. Patient was instructed to call the office if he questionsregarding him treatment. Should him conditions worsen, he should return to office to be reassessed by TODD Rosario. he Should to go the closest Emergency Department for any emergency. They verbalizedunderstanding the above instructions. Return for as scheduled.

## 2021-04-27 ENCOUNTER — HOSPITAL ENCOUNTER (OUTPATIENT)
Dept: ULTRASOUND IMAGING | Age: 66
Discharge: HOME | End: 2021-04-27
Payer: COMMERCIAL

## 2021-04-27 DIAGNOSIS — K74.60 CIRRHOSIS OF LIVER WITHOUT ASCITES, UNSPECIFIED HEPATIC CIRRHOSIS TYPE (HCC): ICD-10-CM

## 2021-04-29 ENCOUNTER — HOSPITAL ENCOUNTER (OUTPATIENT)
Dept: ULTRASOUND IMAGING | Age: 66
Discharge: HOME | End: 2021-04-29
Payer: COMMERCIAL

## 2021-04-29 PROCEDURE — 76705 ECHO EXAM OF ABDOMEN: CPT

## 2021-05-03 ENCOUNTER — OFFICE VISIT (OUTPATIENT)
Dept: FAMILY MEDICINE CLINIC | Age: 66
End: 2021-05-03
Payer: COMMERCIAL

## 2021-05-03 VITALS
HEIGHT: 67 IN | DIASTOLIC BLOOD PRESSURE: 86 MMHG | BODY MASS INDEX: 31.86 KG/M2 | RESPIRATION RATE: 18 BRPM | SYSTOLIC BLOOD PRESSURE: 136 MMHG | WEIGHT: 203 LBS | HEART RATE: 69 BPM | TEMPERATURE: 97.2 F | OXYGEN SATURATION: 97 %

## 2021-05-03 DIAGNOSIS — K74.60 CIRRHOSIS OF LIVER WITHOUT ASCITES, UNSPECIFIED HEPATIC CIRRHOSIS TYPE (HCC): Primary | ICD-10-CM

## 2021-05-03 DIAGNOSIS — I10 ESSENTIAL HYPERTENSION: ICD-10-CM

## 2021-05-03 PROCEDURE — G8427 DOCREV CUR MEDS BY ELIG CLIN: HCPCS | Performed by: NURSE PRACTITIONER

## 2021-05-03 PROCEDURE — 1036F TOBACCO NON-USER: CPT | Performed by: NURSE PRACTITIONER

## 2021-05-03 PROCEDURE — 99214 OFFICE O/P EST MOD 30 MIN: CPT | Performed by: NURSE PRACTITIONER

## 2021-05-03 PROCEDURE — 3017F COLORECTAL CA SCREEN DOC REV: CPT | Performed by: NURSE PRACTITIONER

## 2021-05-03 PROCEDURE — G8417 CALC BMI ABV UP PARAM F/U: HCPCS | Performed by: NURSE PRACTITIONER

## 2021-05-03 ASSESSMENT — ENCOUNTER SYMPTOMS
COUGH: 0
NAUSEA: 0
WHEEZING: 0
VOMITING: 0
ABDOMINAL PAIN: 0
SHORTNESS OF BREATH: 0
CHEST TIGHTNESS: 0
DIARRHEA: 0
SORE THROAT: 0

## 2021-05-03 NOTE — PROGRESS NOTES
Aman Castro is a 61 y.o. male who presents today for  Chief Complaint   Patient presents with    Results       HPI:  Here for follow-up on liver US. He was in [facility] ER on 4/5 with chest pain and was seen here for follow-up on that last month. He had a CTA of the chest with an incidental finding of nodular changes of the liver concerning for cirrhosis. He had a liver US last week which showed coarsened hepatic echotexture without evidence of a mass. Hepatic cirrhosis not excluded. He has no recent elevated liver enzymes on labs and no history of this dating back 3 years. He has had no abdominal pain or swelling. No nausea or vomiting. He does have a history of chronic alcohol use. He states he drank 3 fifths whiskey per week or around a half gallon per week as well as a case of beer a week for more than 10 years. He stopped drinking completely last month. BP has been stable, controlled on current medication. He is taking meloxicam as needed for joint pain. Initially took occasionally but has been taking daily or every other day for the past couple of months. Review of Systems   Constitutional: Negative for chills and fever. HENT: Negative for congestion, ear pain and sore throat. Respiratory: Negative for cough, chest tightness, shortness of breath and wheezing. Cardiovascular: Negative for chest pain. Gastrointestinal: Negative for abdominal pain, diarrhea, nausea and vomiting. Musculoskeletal: Negative for arthralgias and myalgias. Skin: Negative for rash.        Past Medical History:   Diagnosis Date    Hypertension     Joint pain        Current Outpatient Medications   Medication Sig Dispense Refill    omeprazole (PRILOSEC) 20 MG delayed release capsule Take 1 capsule by mouth daily as needed (GERD) 30 capsule 2    lisinopril (PRINIVIL;ZESTRIL) 20 MG tablet Take 1 tablet by mouth daily 90 tablet 3    albuterol sulfate  (90 Base) MCG/ACT inhaler Inhale 2 puffs into the lungs every 6 hours as needed for Wheezing 1 Inhaler 0    aspirin 325 MG EC tablet Take 325 mg by mouth daily       No current facility-administered medications for this visit. Allergies   Allergen Reactions    Codeine        Past Surgical History:   Procedure Laterality Date    TONSILLECTOMY         Social History     Tobacco Use    Smoking status: Never Smoker    Smokeless tobacco: Never Used   Substance Use Topics    Alcohol use: Yes    Drug use: No       Family History   Problem Relation Age of Onset    Diabetes Mother     High Blood Pressure Mother     Heart Disease Father        /86   Pulse 69   Temp 97.2 °F (36.2 °C) (Temporal)   Resp 18   Ht 5' 7\" (1.702 m)   Wt 203 lb (92.1 kg)   SpO2 97%   BMI 31.79 kg/m²     Physical Exam  Vitals signs reviewed. Constitutional:       General: He is not in acute distress. Appearance: Normal appearance. He is well-developed. HENT:      Head: Normocephalic. Eyes:      Conjunctiva/sclera: Conjunctivae normal.      Pupils: Pupils are equal, round, and reactive to light. Neck:      Musculoskeletal: Normal range of motion and neck supple. Thyroid: No thyromegaly. Vascular: No carotid bruit or JVD. Trachea: No tracheal deviation. Cardiovascular:      Rate and Rhythm: Normal rate and regular rhythm. Heart sounds: Normal heart sounds. No murmur. Pulmonary:      Effort: Pulmonary effort is normal. No respiratory distress. Breath sounds: Normal breath sounds. Abdominal:      General: Abdomen is flat. Bowel sounds are normal. There is no distension. Palpations: Abdomen is soft. There is no mass. Tenderness: There is no abdominal tenderness. There is no guarding or rebound. Hernia: No hernia is present. Musculoskeletal: Normal range of motion. Lymphadenopathy:      Cervical: No cervical adenopathy. Skin:     General: Skin is warm and dry. Findings: No rash. a key part of your treatment and safety. Be sure to make and go to all appointments, and call your doctor if you are having problems. It's also a good idea to know your test results and keep a list of the medicines you take. How can you care for yourself at home? · Work with your doctor or dietitian to create a food plan that guides your daily food choices. · Do not skip meals or go for many hours without eating. If you eat several small meals during the day, you have a better chance of getting the extra calories your body needs for energy. · Follow your doctor's or dietitian's instructions on how to get the right amount of protein in your diet. Examples of animal protein are:  ? Meat, fish, and poultry. ? Eggs. ? Milk and milk products. · Your doctor or dietitian may ask you to eat a certain amount of protein that comes from plants (rather than protein that comes from animals). You can get plant protein from foods such as:  ? Cooked dried beans and peas. ? Peanut butter, nuts, and seeds. ? Tofu. · Limit salt, if your doctor tells you to. This will help prevent fluid buildup in your belly and chest, which can cause serious problems. Salt is in many prepared foods, such as dash, canned foods, snack foods, sauces, and soups. Look for reduced-salt products. · Your doctor may recommend vitamin and mineral supplements. However, do not take any other medicine, including over-the-counter medicines, vitamins, and herbal products, without talking to your doctor first.  · Do not drink any alcohol. It can harm your liver. Talk to your doctor if you need help to stop drinking. · If you have a loss of appetite or have nausea or vomiting, try to:  ? Stay away from foods and food smells that make you feel worse. ? Avoid greasy or fatty foods. ? Eat food that settles your stomach when it feels upset. Try crackers, dry toast, or cindy (cindy tea, hard cindy candy, or crystallized cindy).   When should you call for for sodium. · Be aware that sodium can come in forms other than salt, including monosodium glutamate (MSG), sodium citrate, and sodium bicarbonate (baking soda). MSG is often added to Asian food. When you eat out, you can sometimes ask for food without MSG or added salt. Buy low-sodium foods  · Buy foods that are labeled \"unsalted\" (no salt added), \"sodium-free\" (less than 5 mg of sodium per serving), or \"low-sodium\" (140 mg or less of sodium per serving). Foods labeled \"reduced-sodium\" and \"light sodium\" may still have too much sodium. Be sure to read the label to see how much sodium you are getting. · Buy fresh vegetables, or frozen vegetables without added sauces. Buy low-sodium versions of canned vegetables, soups, and other canned goods. Prepare low-sodium meals  · Cut back on the amount of salt you use in cooking. This will help you adjust to the taste. Do not add salt after cooking. One teaspoon of salt has about 2,300 mg of sodium. · Take the salt shaker off the table. · Flavor your food with garlic, lemon juice, onion, vinegar, herbs, and spices. Do not use soy sauce, lite soy sauce, steak sauce, onion salt, garlic salt, celery salt, or ketchup on your food. · Use low-sodium salad dressings, sauces, and ketchup. Or make your own salad dressings and sauces without adding salt. · Use less salt (or none) when recipes call for it. You can often use half the salt a recipe calls for without losing flavor. Other foods such as rice, pasta, and grains do not need added salt. · Rinse canned vegetables, and cook them in fresh water. This removes somebut not allof the salt. · Avoid water that is naturally high in sodium or that has been treated with water softeners, which add sodium. If you buy bottled water, read the label and choose a sodium-free brand. Avoid high-sodium foods  · Avoid eating:  ? Smoked, cured, salted, and canned meat, fish, and poultry. ? Ham, dash, hot dogs, and luncheon meats.   ? Regular, hard, and processed cheese and regular peanut butter. ? Crackers with salted tops, and other salted snack foods such as pretzels, chips, and salted popcorn. ? Frozen prepared meals, unless labeled low-sodium. ? Canned and dried soups, broths, and bouillon, unless labeled sodium-free or low-sodium. ? Canned vegetables, unless labeled sodium-free or low-sodium. ? Western Renata fries, pizza, tacos, and other fast foods. ? Pickles, olives, ketchup, and other condiments, especially soy sauce, unless labeled sodium-free or low-sodium. Where can you learn more? Go to https://PeerformpeiVerse Mediaeweb.Sift Science. org and sign in to your DanceOn account. Enter H826 in the inexio box to learn more about \"Low Sodium Diet (2,000 Milligram): Care Instructions. \"     If you do not have an account, please click on the \"Sign Up Now\" link. Current as of: December 17, 2020               Content Version: 12.8  © 8603-8467 Lock[employer]. Care instructions adapted under license by [facility] ([facility]). If you have questions about a medical condition or this instruction, always ask your healthcare professional. [place] [address] any warranty or liability for your use of this information. Patient voicesunderstanding and agrees to plans along with risks and benefits of plan. Counseling:  Teri Delarosa's case, medications and options were discussed in detail. Patient was instructed to call the office if he questionsregarding him treatment. Should him conditions worsen, he should return to office to be reassessed by TODD Cotter. he Should to go the closest Emergency Department for any emergency. They verbalizedunderstanding the above instructions. Return in about 3 weeks (around 5/24/2021) for follow up.

## 2021-05-03 NOTE — PATIENT INSTRUCTIONS
prepared and packaged foods and any salt you add to your food. Follow-up care is a key part of your treatment and safety. Be sure to make and go to all appointments, and call your doctor if you are having problems. It's also a good idea to know your test results and keep a list of the medicines you take. How can you care for yourself at home? Read food labels  · Read labels on cans and food packages. The labels tell you how much sodium is in each serving. Make sure that you look at the serving size. If you eat more than the serving size, you have eaten more sodium. · Food labels also tell you the Percent Daily Value for sodium. Choose products with low Percent Daily Values for sodium. · Be aware that sodium can come in forms other than salt, including monosodium glutamate (MSG), sodium citrate, and sodium bicarbonate (baking soda). MSG is often added to Asian food. When you eat out, you can sometimes ask for food without MSG or added salt. Buy low-sodium foods  · Buy foods that are labeled \"unsalted\" (no salt added), \"sodium-free\" (less than 5 mg of sodium per serving), or \"low-sodium\" (140 mg or less of sodium per serving). Foods labeled \"reduced-sodium\" and \"light sodium\" may still have too much sodium. Be sure to read the label to see how much sodium you are getting. · Buy fresh vegetables, or frozen vegetables without added sauces. Buy low-sodium versions of canned vegetables, soups, and other canned goods. Prepare low-sodium meals  · Cut back on the amount of salt you use in cooking. This will help you adjust to the taste. Do not add salt after cooking. One teaspoon of salt has about 2,300 mg of sodium. · Take the salt shaker off the table. · Flavor your food with garlic, lemon juice, onion, vinegar, herbs, and spices. Do not use soy sauce, lite soy sauce, steak sauce, onion salt, garlic salt, celery salt, or ketchup on your food. · Use low-sodium salad dressings, sauces, and ketchup.  Or make your own salad dressings and sauces without adding salt. · Use less salt (or none) when recipes call for it. You can often use half the salt a recipe calls for without losing flavor. Other foods such as rice, pasta, and grains do not need added salt. · Rinse canned vegetables, and cook them in fresh water. This removes somebut not allof the salt. · Avoid water that is naturally high in sodium or that has been treated with water softeners, which add sodium. If you buy bottled water, read the label and choose a sodium-free brand. Avoid high-sodium foods  · Avoid eating:  ? Smoked, cured, salted, and canned meat, fish, and poultry. ? Ham, dash, hot dogs, and luncheon meats. ? Regular, hard, and processed cheese and regular peanut butter. ? Crackers with salted tops, and other salted snack foods such as pretzels, chips, and salted popcorn. ? Frozen prepared meals, unless labeled low-sodium. ? Canned and dried soups, broths, and bouillon, unless labeled sodium-free or low-sodium. ? Canned vegetables, unless labeled sodium-free or low-sodium. ? Western Renata fries, pizza, tacos, and other fast foods. ? Pickles, olives, ketchup, and other condiments, especially soy sauce, unless labeled sodium-free or low-sodium. Where can you learn more? Go to https://MedicAnimal.com.SportsCrunch. org and sign in to your Tantaline account. Enter R374 in the [facility] box to learn more about \"Low Sodium Diet (2,000 Milligram): Care Instructions. \"     If you do not have an account, please click on the \"Sign Up Now\" link. Current as of: December 17, 2020               Content Version: 12.8  © 2006-2021 Healthwise, Incorporated. Care instructions adapted under license by [facility] ([facility]). If you have questions about a medical condition or this instruction, always ask your healthcare professional. [place] [address] any warranty or liability for your use of this information.

## 2021-05-10 NOTE — PROGRESS NOTES
Chief Complaint:   Chief Complaint   Patient presents with   • Abnormal Imaging     Pt was in [facility] ER 4/5/21-had CT that showed nodular appearance of liver-had liver US 4/29/21 that shows possible cirrhosis-pt presents today to discuss         Patient ID: Frankie Reid is a 66 y.o. male     History of Present Illness: This is a very pleasant 66-year-old male who was referred to our office for abnormal findings on imaging of liver.    The patient was referred by RILEY Jackson after being seen in her office on 5/3/2021 to follow-up on liver ultrasound.  The patient had been at [facility] on 4/5/2021 with chest pain a CTA of the chest was performed with incidental finding of nodular changes on the liver concerning for cirrhosis.  Liver ultrasound noted below.  CBC was unremarkable.  CMP revealed hyperglycemia was unremarkable.  Liver enzymes within normal limits.  INR 1.03.  Triglycerides 223.  The patient denies any IV drug use, alcohol abuse, blood transfusions or tattoos.  No known family history of liver cancer.    The patient denies any nausea, vomiting, epigastric pain, dysphagia, pyrosis or hematemesis.  The patient denies any fever or chills.  Denies any melena or hematochezia.  Denies any unintentional weight loss or loss of appetite.  Patient denies any jaundice, icterus, edema or ascites.        CTA PULMONARY W CONTRAST    Impression    1.   No evidence of pulmonary embolus.   2.  Myocardial megaly was a septation of the lungs. Consider fluid   overload.   3.  Nodular appearance of the liver, concerning for cirrhosis..   Signed by Dr Lindsey Lester on 4/5/2021 4:50 PM      US LIVER    Impression    Coarse hepatic echotexture without evidence of a mass.   Hepatic cirrhosis is not excluded this appearance. There is no   ascites.   Signed by Dr Alex Kamara on 4/29/2021 12:09 PM      Past Medical History:   Diagnosis Date   • Arthritis    • Diverticulosis    • ED (erectile  dysfunction)    • Family history of colonic polyps    • History of adenomatous polyp of colon    • History of colon polyps    • Hypertension        Past Surgical History:   Procedure Laterality Date   • COLONOSCOPY N/A 2/10/2017    One 7mm sessile serated adenomatous polyp in the ascending colon; Two 5-7mm adenomatous polyps in the transverse colon; One 4mm hyperplastic polyp in the rectum; Repeat 3 years   • COLONOSCOPY  10/25/2012    One 6mm adenomatous polyp in the sigmoid colon; Repeat 4 years   • COLONOSCOPY N/A 2/24/2020    Diverticulosis in the left colon; The examination was otherwise normal on direct and retroflexion views; No specimens collected; Repeat 5 years   • MULTIPLE TOOTH EXTRACTIONS     • TONSILLECTOMY           Current Outpatient Medications:   •  aspirin  MG tablet, Take 325 mg by mouth As Needed., Disp: , Rfl:   •  lisinopril (PRINIVIL,ZESTRIL) 20 MG tablet, Take 20 mg by mouth Daily., Disp: , Rfl:   •  omeprazole (priLOSEC) 20 MG capsule, Take 20 mg by mouth As Needed., Disp: , Rfl:   •  Meloxicam 10 MG capsule, Take 1 tablet by mouth Daily., Disp: , Rfl:     Allergies   Allergen Reactions   • Codeine Nausea And Vomiting       Social History     Socioeconomic History   • Marital status:      Spouse name: Not on file   • Number of children: Not on file   • Years of education: Not on file   • Highest education level: Not on file   Tobacco Use   • Smoking status: Never Smoker   • Smokeless tobacco: Never Used   Vaping Use   • Vaping Use: Never used   Substance and Sexual Activity   • Alcohol use: Yes     Comment: Occasionally   • Drug use: No   • Sexual activity: Defer       Family History   Problem Relation Age of Onset   • Colon polyps Sister         < 60 years of age   • Colon cancer Neg Hx    • Esophageal cancer Neg Hx    • Liver cancer Neg Hx    • Liver disease Neg Hx    • Rectal cancer Neg Hx    • Stomach cancer Neg Hx        Vitals:    05/11/21 0848   BP: 134/82   BP Location:  "Left arm   Patient Position: Sitting   Cuff Size: Adult   Pulse: 62   Temp: 97.5 °F (36.4 °C)   TempSrc: Infrared   SpO2: 98%   Weight: 88.9 kg (196 lb)   Height: 170.2 cm (67\")       Review of Systems:    General:    Present -feeling well   Skin:    Not Present-Rash   HEENT:     Not Present-Acute visual changes or Acute hearing changes   Neck :    Not Present- swollen glands   Genitourinary:      Not Present- burning, frequency, urgency hematuria, dysuria,   Cardiovascular:   Not Present-chest pain, palpitations, or pressure   Respiratory:   Not Present- shortness of breath or cough   Gastrointestinal:  Musculoskeletal:  Neurological:  Psychiatric:   Present as mentioned in the HP    Not Present. Recent gait disturbances.    Not Present-Seizures and weakness in extremities.    Not Present- Anxiety or Depression.       Physical Exam:    General Appearance:    Alert, cooperative, in no acute distress   Psych:    Mood appropriate    Eyes:          conjunctivae and sclerae normal, no   icterus, no pallor   ENMT:    Ears appear intact with no abnormalities noted oral mucosa moist   Neck:   No adenopathy, supple, trachea midline, no thyromegaly, no   carotid bruit, no JVD    Cardiovascular:    Regular rhythm and normal rate, normal S1 and S2, no            murmur, no gallop, no rub, no click   Gastrointestinal:     Inspection normal.  Normal bowel sounds, no masses, no organomegaly, soft round non-tender, non-distended, no guarding, no rebound or tenderness. No hepatosplenomegaly.   Skin:   No bleeding, bruising or rash   Neurologic:   nonfocal       Lab Results - Last 18 Months   Lab Units 12/16/20  0918   GLUCOSE mg/dL 96   BUN mg/dL 15   CREATININE mg/dL 1.1   SODIUM mmol/L 141   POTASSIUM mmol/L 3.7   CHLORIDE mmol/L 103   TOTAL CO2 mmol/L 28   TOTAL PROTEIN g/dL 7.2   ALBUMIN g/dL 3.9   ALT (SGPT) U/L 25   AST (SGOT) U/L 20   ALK PHOS U/L 77   BILIRUBIN mg/dL 0.8       Lab Results - Last 18 Months   Lab Units " 04/05/21  1500   HEMOGLOBIN g/dL 15.9   HEMATOCRIT % 46.7   MCV fL 97.1*   WBC K/uL 8.2   RDW % 12.2   MPV fL 11.9   PLATELETS K/uL 125*   INR  1.03       Assessment and Plan:  Assessment/Plan   Diagnoses and all orders for this visit:    1. Abnormal finding on imaging of liver (Primary)  -     Comprehensive Metabolic Panel; Future  -     Hepatitis Panel, Acute; Future  -     AFP Tumor Marker; Future  -     JEREMIAS; Future  -     Mitochondrial Antibodies, M2; Future  -     Anti-Smooth Muscle Antibody Titer; Future  -     Anti-microsomal Antibody; Future  -     Iron Profile; Future  -     Ferritin; Future  -     Ceruloplasmin; Future  -     Alpha - 1 - Antitrypsin; Future  -     Alpha - 1 - Antitrypsin Phenotype; Future    Labs as noted above.  When all are resulted I will review the patient's imaging as well as lab results with Dr. Sales and notify the patient how we will further proceed pending findings.     There are no Patient Instructions on file for this visit.    Next follow-up appointment      EMR Dragon/Transcription disclaimer:  Much of this encounter note is an electronic transcription/translation of spoken language to printed text. The electronic translation of spoken language may permit erroneous, or at times, nonsensical words or phrases to be inadvertently transcribed; although I have reviewed the note for such errors, some may still exist.

## 2021-05-11 ENCOUNTER — OFFICE VISIT (OUTPATIENT)
Dept: GASTROENTEROLOGY | Facility: CLINIC | Age: 66
End: 2021-05-11

## 2021-05-11 ENCOUNTER — LAB (OUTPATIENT)
Dept: LAB | Facility: HOSPITAL | Age: 66
End: 2021-05-11

## 2021-05-11 VITALS
HEART RATE: 62 BPM | DIASTOLIC BLOOD PRESSURE: 82 MMHG | SYSTOLIC BLOOD PRESSURE: 134 MMHG | OXYGEN SATURATION: 98 % | BODY MASS INDEX: 30.76 KG/M2 | WEIGHT: 196 LBS | HEIGHT: 67 IN | TEMPERATURE: 97.5 F

## 2021-05-11 DIAGNOSIS — R93.2 ABNORMAL FINDING ON IMAGING OF LIVER: Primary | ICD-10-CM

## 2021-05-11 DIAGNOSIS — R93.2 ABNORMAL FINDING ON IMAGING OF LIVER: ICD-10-CM

## 2021-05-11 PROCEDURE — 82104 ALPHA-1-ANTITRYPSIN PHENO: CPT

## 2021-05-11 PROCEDURE — 82105 ALPHA-FETOPROTEIN SERUM: CPT

## 2021-05-11 PROCEDURE — 83516 IMMUNOASSAY NONANTIBODY: CPT

## 2021-05-11 PROCEDURE — 83540 ASSAY OF IRON: CPT

## 2021-05-11 PROCEDURE — 80053 COMPREHEN METABOLIC PANEL: CPT

## 2021-05-11 PROCEDURE — 99214 OFFICE O/P EST MOD 30 MIN: CPT | Performed by: NURSE PRACTITIONER

## 2021-05-11 PROCEDURE — 36415 COLL VENOUS BLD VENIPUNCTURE: CPT

## 2021-05-11 PROCEDURE — 82728 ASSAY OF FERRITIN: CPT

## 2021-05-11 PROCEDURE — 86376 MICROSOMAL ANTIBODY EACH: CPT

## 2021-05-11 PROCEDURE — 82390 ASSAY OF CERULOPLASMIN: CPT

## 2021-05-11 PROCEDURE — 80074 ACUTE HEPATITIS PANEL: CPT

## 2021-05-11 PROCEDURE — 82103 ALPHA-1-ANTITRYPSIN TOTAL: CPT

## 2021-05-11 PROCEDURE — 86038 ANTINUCLEAR ANTIBODIES: CPT

## 2021-05-11 PROCEDURE — 84466 ASSAY OF TRANSFERRIN: CPT

## 2021-05-11 RX ORDER — OMEPRAZOLE 20 MG/1
20 CAPSULE, DELAYED RELEASE ORAL AS NEEDED
COMMUNITY
Start: 2021-04-09 | End: 2021-07-19 | Stop reason: HOSPADM

## 2021-05-17 LAB
A1AT PHENOTYP SERPL IFE: NORMAL
A1AT SERPL-MCNC: 139 MG/DL (ref 101–187)

## 2021-05-24 ENCOUNTER — TELEPHONE (OUTPATIENT)
Dept: GASTROENTEROLOGY | Facility: CLINIC | Age: 66
End: 2021-05-24

## 2021-05-24 DIAGNOSIS — R93.2 ABNORMAL FINDING ON IMAGING OF LIVER: Primary | ICD-10-CM

## 2021-05-24 NOTE — TELEPHONE ENCOUNTER
Tried to call pt re: results-was unable to reach him so I left VM asking him to call me back to discuss.

## 2021-05-24 NOTE — TELEPHONE ENCOUNTER
----- Message from RILEY Rosas sent at 5/24/2021 12:42 PM CDT -----  Please notify the patient that I have reviewed lab results along with Dr. Sales.  She would like more blood work performed.  I have placed the lab for this.  She also wanted liver ultrasound with elastography as the one that was done before did not have this.

## 2021-05-25 NOTE — TELEPHONE ENCOUNTER
Pt returned my call and I spoke to him re: results-he VU. Pt aware hospital will be calling him to schedule US and he is to call me at the end of the week if he hasn't heard from them. I also advised that he could have labs done that Dr. Sales wanted the same day as his US to keep him from making 2 trips up here. Pt advised to call me back with any further questions/problems, otherwise I will be calling him with results as they come back.

## 2021-05-28 ENCOUNTER — TELEMEDICINE (OUTPATIENT)
Dept: FAMILY MEDICINE CLINIC | Age: 66
End: 2021-05-28
Payer: COMMERCIAL

## 2021-05-28 DIAGNOSIS — K74.60 CIRRHOSIS OF LIVER WITHOUT ASCITES, UNSPECIFIED HEPATIC CIRRHOSIS TYPE (HCC): Primary | ICD-10-CM

## 2021-05-28 DIAGNOSIS — I10 ESSENTIAL HYPERTENSION: ICD-10-CM

## 2021-05-28 PROCEDURE — G8417 CALC BMI ABV UP PARAM F/U: HCPCS | Performed by: FAMILY MEDICINE

## 2021-05-28 PROCEDURE — 99214 OFFICE O/P EST MOD 30 MIN: CPT | Performed by: FAMILY MEDICINE

## 2021-05-28 PROCEDURE — 1036F TOBACCO NON-USER: CPT | Performed by: FAMILY MEDICINE

## 2021-05-28 PROCEDURE — G8428 CUR MEDS NOT DOCUMENT: HCPCS | Performed by: FAMILY MEDICINE

## 2021-05-28 PROCEDURE — 3017F COLORECTAL CA SCREEN DOC REV: CPT | Performed by: FAMILY MEDICINE

## 2021-05-28 NOTE — PROGRESS NOTES
2021    TELEHEALTH EVALUATION -- Audio/Visual (During CRMIB-95 public health emergency)    HPI:    Noris Antony (:  1955) has requested an audio/video evaluation for the following concern(s):    Patient was in the emergency department in April for chest discomfort and a CT angiogram of the chest was performed. There is incidental finding of nodular changes of the liver concerning for cirrhosis. Liver ultrasound was concerning for cirrhosis as well. He was referred to gastroenterology and was evaluated on May 11. Additional laboratory work was obtained but he has a strong history of alcohol use in the past.  States he has abstained from alcohol since his diagnosis. He has no complaints today. No chest pain, palpitations, shortness of breath, abdominal pain, abdominal distention or jaundice. Hypertension  Compliant with medications. No adverse effects from medication. No lightheadedness, palpitations, or chest pain. Review of Systems    Prior to Visit Medications    Medication Sig Taking? Authorizing Provider   omeprazole (PRILOSEC) 20 MG delayed release capsule Take 1 capsule by mouth daily as needed (GERD)  TODD William   lisinopril (PRINIVIL;ZESTRIL) 20 MG tablet Take 1 tablet by mouth daily  Enma Gardner MD   albuterol sulfate  (90 Base) MCG/ACT inhaler Inhale 2 puffs into the lungs every 6 hours as needed for Wheezing  Enma Gardner MD   aspirin 325 MG EC tablet Take 325 mg by mouth daily  Historical Provider, MD       Social History     Tobacco Use    Smoking status: Never Smoker    Smokeless tobacco: Never Used   Substance Use Topics    Alcohol use: Yes    Drug use:  No            PHYSICAL EXAMINATION:  [ INSTRUCTIONS:  \"[x]\" Indicates a positive item  \"[]\" Indicates a negative item  -- DELETE ALL ITEMS NOT EXAMINED]  Vital Signs: (As obtained by patient/caregiver or practitioner observation)    Blood pressure-  Heart rate-    Respiratory rate- Temperature-  Pulse oximetry-     Constitutional: [x] Appears well-developed and well-nourished [x] No apparent distress      [] Abnormal-   Mental status  [x] Alert and awake  [] Oriented to person/place/time [x]Able to follow commands      Eyes:  EOM    [x]  Normal  [] Abnormal-  Sclera  [x]  Normal  [] Abnormal -         Discharge []  None visible  [] Abnormal -    HENT:   [x] Normocephalic, atraumatic. [] Abnormal   [] Mouth/Throat: Mucous membranes are moist.     External Ears [x] Normal  [] Abnormal-     Neck: [x] No visualized mass     Pulmonary/Chest: [x] Respiratory effort normal.  [x] No visualized signs of difficulty breathing or respiratory distress        [] Abnormal-      Musculoskeletal:   [] Normal gait with no signs of ataxia         [] Normal range of motion of neck        [] Abnormal-       Neurological:        [] No Facial Asymmetry (Cranial nerve 7 motor function) (limited exam to video visit)          [] No gaze palsy        [] Abnormal-         Skin:        [x] No significant exanthematous lesions or discoloration noted on facial skin         [] Abnormal-            Psychiatric:       [x] Normal Affect [x] No Hallucinations        [] Abnormal-     Other pertinent observable physical exam findings-     No results found for this or any previous visit (from the past 672 hour(s)). ASSESSMENT/PLAN:  1. Cirrhosis of liver without ascites, unspecified hepatic cirrhosis type ([address]  Recommended abstinence from alcohol  Currently seeing gastroenterology [facility]. Reviewed dietary changes    2. Essential hypertension  BP Readings from Last 3 Encounters:   05/03/21 136/86   04/09/21 136/88   04/05/21 (!) 144/77     Stable    This office visit was 35 minutes. A time was spent reviewing his records from [facility], recent laboratory work, imaging, and counseling regarding cirrhosis  Return in about 3 months (around 8/28/2021) for Routine follow up - 15 minute visit.     Myla Espinoza is a 61 y.o. male being evaluated by a Virtual Visit (video visit) encounter to address concerns as mentioned above. A caregiver was present when appropriate. Due to this being a TeleHealth encounter (During AA-66 public health emergency), evaluation of the following organ systems was limited: Vitals/Constitutional/EENT/Resp/CV/GI//MS/Neuro/Skin/Heme-Lymph-Imm. Pursuant to the emergency declaration under the [address] [place] [address] authority and the [employer] Act, this Virtual Visit was conducted with patient's (and/or legal guardian's) consent, to reduce the patient's risk of exposure to COVID-19 and provide necessary medical care. The patient (and/or legal guardian) has also been advised to contact this office for worsening conditions or problems, and seek emergency medical treatment and/or call 911 if deemed necessary. Patient identification was verified at the start of the visit: Yes    Total time spent on this encounter: Not billed by time    Services were provided through a video synchronous discussion virtually to substitute for in-person clinic visit. Patient and provider were located at their individual homes. --Deena Nava MD on 6/5/2021 at 9:37 AM    An electronic signature was used to authenticate this note.

## 2021-06-01 ENCOUNTER — LAB (OUTPATIENT)
Dept: LAB | Facility: HOSPITAL | Age: 66
End: 2021-06-01

## 2021-06-01 ENCOUNTER — HOSPITAL ENCOUNTER (OUTPATIENT)
Dept: ULTRASOUND IMAGING | Facility: HOSPITAL | Age: 66
Discharge: HOME | End: 2021-06-01

## 2021-06-01 DIAGNOSIS — R93.2 ABNORMAL FINDING ON IMAGING OF LIVER: ICD-10-CM

## 2021-06-01 PROCEDURE — 81256 HFE GENE: CPT

## 2021-06-01 PROCEDURE — 76981 USE PARENCHYMA: CPT

## 2021-06-01 PROCEDURE — 76705 ECHO EXAM OF ABDOMEN: CPT

## 2021-06-01 PROCEDURE — 36415 COLL VENOUS BLD VENIPUNCTURE: CPT

## 2021-06-02 ENCOUNTER — TELEPHONE (OUTPATIENT)
Dept: GASTROENTEROLOGY | Facility: CLINIC | Age: 66
End: 2021-06-02

## 2021-06-02 NOTE — TELEPHONE ENCOUNTER
Called and spoke to pt re: results-he VU. Pt is aware I will be calling him back once labs are resulted and Pratima has talked to Dr. Sales. Pt advised to call me back with any further questions/problems.

## 2021-06-02 NOTE — TELEPHONE ENCOUNTER
----- Message from RILEY Rosas sent at 6/2/2021  8:19 AM CDT -----  Please notify the patient that liver elastography with nodular contour is suggestive of cirrhosis.  I am still waiting on the last lab for hemochromatosis that Dr. Sales wanted me to order and then I will get with her to discuss findings.

## 2021-06-03 LAB — HFE GENE MUT ANL BLD/T: NORMAL

## 2021-06-07 ENCOUNTER — TELEPHONE (OUTPATIENT)
Dept: GASTROENTEROLOGY | Facility: CLINIC | Age: 66
End: 2021-06-07

## 2021-06-07 PROBLEM — E83.110 HEREDITARY HEMOCHROMATOSIS: Status: ACTIVE | Noted: 2021-06-07

## 2021-06-07 NOTE — TELEPHONE ENCOUNTER
"Called and spoke to pt re: results-he VU. I answered his questions to the best of my ability but advised him Dr. Sales would be able to go over everything in more detail and answer further questions. I tried to transfer him to schedule appt but was unable to reach anyone. I will have Ct call pt to arrange f/u. Pt tells me he is at work \"and about to get busy\" so he would like us to just schedule her first available and leave info on his VM-pt will call us back if there are issues. Pt advised to call me back with any further questions/problems between now and his follow up appt.     "

## 2021-06-07 NOTE — TELEPHONE ENCOUNTER
----- Message from RILEY Rosas sent at 6/7/2021  8:44 AM CDT -----  Please notify the patient that labs and liver ultrasound revealed that the patient has hemochromatosis.  Dr. Sales wants him to have an office visit with her so that she can go over plan of treatment for this including phlebotomy.  She would like next available appointment set up with her.

## 2021-06-07 NOTE — TELEPHONE ENCOUNTER
----- Message from RILEY Rosas sent at 6/7/2021  8:47 AM CDT -----  Also please notify the patient that this is an inherited gene and all offspring from this patient will be carriers and other family members are at increased risk which Dr. Sales will discuss with him as well

## 2021-06-21 NOTE — PROGRESS NOTES
Primary Physician: Ermias Monteiro MD    Chief Complaint   Patient presents with   • Follow-up     Pt presents today for hemochromatosis follow up-had labs/liver US 6/1/21       Subjective     Frankie Reid is a 66 y.o. male.    HPI   Genetic hemochromatosis-new dx today  Patient had CTA at [facility] 4/5/2021 for other reasons which showed nodular changes of the liver concerning for cirrhosis.  LFTs have been normal.  Laboratory panel through this office shows high ferritin in the 700 range.  He is homozygous for H63D mutation.  No family history of liver disease.   He was drinking 1-2 cases of beer a week or 1/5th whiskey a week.  He stopped alcohol on April 4th.    Sonogram of the liver with elastography 6/2021 shows F3/F4 status.  No masses noted.  aFP is normal.  He has had no prior endoscopy.     Adenomatous colon polyps  Adenomas were removed in 2012 and 2017.  Last colonoscopy done 2/2020 and unremarkable.  He will need a repeat colonoscopy in 2/2025.  His sister also has had colon polyps less than 60 years old.      Past Medical History:   Diagnosis Date   • Arthritis    • Diverticulosis    • ED (erectile dysfunction)    • Family history of colonic polyps    • History of adenomatous polyp of colon    • History of colon polyps    • Hypertension        Past Surgical History:   Procedure Laterality Date   • COLONOSCOPY N/A 2/10/2017    One 7mm sessile serated adenomatous polyp in the ascending colon; Two 5-7mm adenomatous polyps in the transverse colon; One 4mm hyperplastic polyp in the rectum; Repeat 3 years   • COLONOSCOPY  10/25/2012    One 6mm adenomatous polyp in the sigmoid colon; Repeat 4 years   • COLONOSCOPY N/A 2/24/2020    Diverticulosis in the left colon; The examination was otherwise normal on direct and retroflexion views; No specimens collected; Repeat 5 years   • MULTIPLE TOOTH EXTRACTIONS     • TONSILLECTOMY          Current Outpatient Medications:   •  aspirin  MG tablet,  "Take 325 mg by mouth As Needed., Disp: , Rfl:   •  lisinopril (PRINIVIL,ZESTRIL) 20 MG tablet, Take 20 mg by mouth Daily., Disp: , Rfl:   •  omeprazole (priLOSEC) 20 MG capsule, Take 20 mg by mouth As Needed., Disp: , Rfl:     Allergies   Allergen Reactions   • Codeine Nausea And Vomiting       Social History     Socioeconomic History   • Marital status:      Spouse name: Not on file   • Number of children: Not on file   • Years of education: Not on file   • Highest education level: Not on file   Tobacco Use   • Smoking status: Never Smoker   • Smokeless tobacco: Never Used   Vaping Use   • Vaping Use: Never used   Substance and Sexual Activity   • Alcohol use: Not Currently   • Drug use: No   • Sexual activity: Defer       Family History   Problem Relation Age of Onset   • Colon polyps Sister         < 60 years of age   • Colon cancer Neg Hx    • Esophageal cancer Neg Hx    • Liver cancer Neg Hx    • Liver disease Neg Hx    • Rectal cancer Neg Hx    • Stomach cancer Neg Hx        Review of Systems   Constitutional: Negative for fever.   Respiratory: Negative for cough and shortness of breath.    Cardiovascular: Negative for chest pain.   Genitourinary: Negative for dysuria.   Skin: Negative for rash.   Neurological: Negative for seizures.       Objective     /70 (BP Location: Left arm, Patient Position: Sitting, Cuff Size: Adult)   Pulse 71   Temp 98.7 °F (37.1 °C) (Infrared)   Ht 170.2 cm (67\")   Wt 88.5 kg (195 lb)   SpO2 98%   BMI 30.54 kg/m²     Physical Exam  Constitutional:       Appearance: He is well-developed.   Pulmonary:      Effort: Pulmonary effort is normal.   Musculoskeletal:         General: Normal range of motion.   Skin:     General: Skin is warm.   Neurological:      Mental Status: He is alert and oriented to person, place, and time.   Psychiatric:         Behavior: Behavior normal.         Lab Results - Last 18 Months   Lab Units 05/11/21  0952 12/16/20  0918   GLUCOSE mg/dL " 92 96   BUN mg/dL 15 15   CREATININE mg/dL 1.13 1.1   SODIUM mmol/L 139 141   POTASSIUM mmol/L 4.0 3.7   CHLORIDE mmol/L 104 103   TOTAL CO2 mmol/L  --  28   CO2 mmol/L 25.0  --    TOTAL PROTEIN g/dL 7.3 7.2   ALBUMIN g/dL 4.30 3.9   ALT (SGPT) U/L 29 25   AST (SGOT) U/L 21 20   ALK PHOS U/L 73 77   BILIRUBIN mg/dL 0.9 0.8   GLOBULIN gm/dL 3.0  --    ALPHA-FETOPROTEIN ng/mL 3.61  --        Lab Results - Last 18 Months   Lab Units 04/05/21  1500   HEMOGLOBIN g/dL 15.9   HEMATOCRIT % 46.7   MCV fL 97.1*   WBC K/uL 8.2   RDW % 12.2   MPV fL 11.9   PLATELETS K/uL 125*   INR  1.03       Lab Results - Last 18 Months   Lab Units 05/11/21  0952   IRON mcg/dL 166*   TIBC mcg/dL 294*   IRON SATURATION % 57*   FERRITIN ng/mL 737.00*        Lab Results - Last 18 Months   Lab Units 06/01/21  1045 05/11/21  0952   HEP B C IGM   --  Non-Reactive   HEP B S AG   --  Non-Reactive   ALPHA-FETOPROTEIN ng/mL  --  3.61   FERRITIN ng/mL  --  737.00*   CERULOPLASMIN mg/dL  --  19   MITOCHONDRIAL AB Units  --  <20.0   A1 ANTITRYPSIN mg/dL  --  139   HEMOCHROMATOSIS GENE  Comment  --        EXAM: US ELASTOGRAPHY PARENCHYMA-, US LIVER- - 6/1/2021 12:08 PM CDT     HISTORY: abnormal imaging on CT of liver; R93.2-Abnormal findings on  diagnostic imaging of liver and biliary tract       COMPARISON: Report from outside ultrasound from 04/29/2021.      TECHNIQUE: Real-time ultrasound performed with representative images  saved to PACS. Exam performed using Siemens pulse shear wave  elastography (pSWE) technology.     FINDINGS:  LIVER: Coarsened echotexture of the liver. Nodular contour of the liver.  No focal lesion identified.     10 shear wave measurements were acquired and demonstrate a median  velocity of 1.6 m/s. The IQR/median velocity ratio is 0.07.     Metavir Score F>/=3     BILE DUCTS AND GALLBLADDER. No definite acute abnormality in the  gallbladder. Normal gallbladder wall thickness. Common bile duct  measures 0.3 cm.     PANCREAS.  Partially visualized pancreas within normal limits.     IVC. Visualized IVC within normal limits.     AORTA. Visualized abdominal aorta within normal limits.     RIGHT KIDNEY: The RIGHT kidney measures 10.1 x 5.1 x 5.8 cm. No  hydronephrosis or parenchymal abnormality.     OTHER: No definite ascites.        IMPRESSION:  1. Course and echotexture of the liver with nodular contour suggestive  of cirrhosis.  2. Metavir Score F>/=3           Liver Fibrosis Staging      Metavir                    m/s     Mild-Moderate                    F2                     1.34 m/s     Moderate-Severe               F3                     1.55 m/s     Cirrhosis                              F4                     1.8 m/s     Shear wave measurements may be affected by, hepatic congestion,  steatosis, and inflammation.  This report was finalized on 06/01/2021 15:16 by Dr. Ivan King MD.      IMPRESSION/PLAN:    Assessment/Plan      Problem List Items Addressed This Visit        Endocrine and Metabolic    Hereditary hemochromatosis (CMS/HCC) - Primary    Overview     Homozygous for H63D.  Ferritin 700 range.  I have advised phlebotomy program.  I reviewed this with him in detail.  I also reviewed that hereditary hemochromatosis is an independent risk factor for hepatocellular carcinoma.  He will need laboratories and serial sonograms of liver done on a regular basis.    Will need to screen for varices.         Current Assessment & Plan     The risks, benefits, and alternatives of endoscopy were reviewed with the patient today.  Risks including perforation, with or without dilation, possibly requiring surgery.  Additional risks include risk of bleeding.  There is also the risk of a drug reaction or problems with anesthesia.  This will be discussed with the further by the anesthesia team on the day of the procedure. The benefits include the diagnosis and management of disease of the upper digestive tract.  Alternatives to endoscopy  include upper GI series, laboratory testing, radiographic evaluation, or no intervention.  The patient verbalizes understanding and agrees.    In accordance with requirements under the Affordable Care Act, [facility] has provided pricing for all hospital services and items on each of its websites. However, a patient's actual cost may differ based on the services the patient receives to meet individual healthcare needs and based on the benefits provided under the patient’s insurance coverage.           Relevant Orders    Case Request (Completed)    Phlebotomy Therapeutic    Ferritin    CBC & Differential       Gastrointestinal Abdominal     History of adenomatous polyp of colon    Overview     Adenomatous colon polyps were removed in 2012 and 2017.  Last colonoscopy 2/2020 unremarkable.  He will be due for repeat colonoscopy in 2/2025.         Abnormal finding on imaging of liver    Overview     CTA of the chest shows nodular liver suggestive of cirrhosis.  Subsequent sonogram of the liver performed at [facility] shows coarse hepatic texture.  No focal mass.  LFTs are normal.  Full serological evaluation unremarkable with exception of a ferritin in the 700 range with increased iron saturation. Homozygous for H63D mutation..  Fib 4 score is 2 which is nondiagnostic. Elastography = F3.    He has not been vaccinated for Hep A or B.  I have advised the Twinrix vaccine.  He will check with Dr. Monteiro         Relevant Orders    Case Request (Completed)          MEDICAL COMPLEXITY must have 2 out of 3   Moderate Complexity Level 4                                                                                                              1 of the following medical problems:                                                                                               []One chronic illness with mild exacerbation                                                                                []Two or more stable chronic illness                                                                                               [x]One new problem  []One acute illness with systemic symptoms     Complexity of Data  Reviewed (1 out of the 3 following categories)                                             Category 1 tests, documents, historian (must have 3 points)                                                      []Review of prior external records  [x]Review of results of unique tests  [x]Ordering unique tests   []Assessment requires an independent historian   Category 2 Interpretation of tests     []Independent interpretation of test read by another doc   Category 3 Discuss Management/tests  []Discussion with external physician     Risk of complications and/or morbidity                                                                                           []Prescription Drug Management     [x]Decision for elective endoscopic procedure              RTC 6 months      Dorothea Sales MD  06/23/21  15:24 CDT    Much of this encounter note is an electronic transcription/translation of spoken language to printed text. The electronic translation of spoken language may permit erroneous, or at times, nonsensical words or phrases to be inadvertently transcribed; although I have reviewed the note for such errors, some may still exist.

## 2021-06-23 ENCOUNTER — OFFICE VISIT (OUTPATIENT)
Dept: GASTROENTEROLOGY | Facility: CLINIC | Age: 66
End: 2021-06-23

## 2021-06-23 VITALS
BODY MASS INDEX: 30.61 KG/M2 | DIASTOLIC BLOOD PRESSURE: 70 MMHG | SYSTOLIC BLOOD PRESSURE: 126 MMHG | HEART RATE: 71 BPM | WEIGHT: 195 LBS | TEMPERATURE: 98.7 F | HEIGHT: 67 IN | OXYGEN SATURATION: 98 %

## 2021-06-23 DIAGNOSIS — E83.110 HEREDITARY HEMOCHROMATOSIS (HCC): Primary | ICD-10-CM

## 2021-06-23 DIAGNOSIS — R93.2 ABNORMAL FINDING ON IMAGING OF LIVER: ICD-10-CM

## 2021-06-23 DIAGNOSIS — Z86.010 HISTORY OF ADENOMATOUS POLYP OF COLON: ICD-10-CM

## 2021-06-23 PROCEDURE — 99214 OFFICE O/P EST MOD 30 MIN: CPT | Performed by: INTERNAL MEDICINE

## 2021-06-23 NOTE — ASSESSMENT & PLAN NOTE
The risks, benefits, and alternatives of endoscopy were reviewed with the patient today.  Risks including perforation, with or without dilation, possibly requiring surgery.  Additional risks include risk of bleeding.  There is also the risk of a drug reaction or problems with anesthesia.  This will be discussed with the further by the anesthesia team on the day of the procedure. The benefits include the diagnosis and management of disease of the upper digestive tract.  Alternatives to endoscopy include upper GI series, laboratory testing, radiographic evaluation, or no intervention.  The patient verbalizes understanding and agrees.    In accordance with requirements under the Affordable Care Act, [facility] has provided pricing for all hospital services and items on each of its websites. However, a patient's actual cost may differ based on the services the patient receives to meet individual healthcare needs and based on the benefits provided under the patient’s insurance coverage.

## 2021-07-07 ENCOUNTER — TELEPHONE (OUTPATIENT)
Dept: GASTROENTEROLOGY | Facility: CLINIC | Age: 66
End: 2021-07-07

## 2021-07-07 ENCOUNTER — LAB (OUTPATIENT)
Dept: LAB | Facility: HOSPITAL | Age: 66
End: 2021-07-07

## 2021-07-07 DIAGNOSIS — E83.110 HEREDITARY HEMOCHROMATOSIS (HCC): ICD-10-CM

## 2021-07-07 LAB
BASOPHILS # BLD AUTO: 0.09 10*3/MM3 (ref 0–0.2)
BASOPHILS NFR BLD AUTO: 1 % (ref 0–1.5)
DEPRECATED RDW RBC AUTO: 44.2 FL (ref 37–54)
EOSINOPHIL # BLD AUTO: 0.51 10*3/MM3 (ref 0–0.4)
EOSINOPHIL NFR BLD AUTO: 5.6 % (ref 0.3–6.2)
ERYTHROCYTE [DISTWIDTH] IN BLOOD BY AUTOMATED COUNT: 12.6 % (ref 12.3–15.4)
FERRITIN SERPL-MCNC: 724.5 NG/ML (ref 30–400)
HCT VFR BLD AUTO: 46.3 % (ref 37.5–51)
HGB BLD-MCNC: 16.3 G/DL (ref 13–17.7)
IMM GRANULOCYTES # BLD AUTO: 0.08 10*3/MM3 (ref 0–0.05)
IMM GRANULOCYTES NFR BLD AUTO: 0.9 % (ref 0–0.5)
LYMPHOCYTES # BLD AUTO: 3.03 10*3/MM3 (ref 0.7–3.1)
LYMPHOCYTES NFR BLD AUTO: 33.3 % (ref 19.6–45.3)
Lab: NORMAL
MCH RBC QN AUTO: 33.7 PG (ref 26.6–33)
MCHC RBC AUTO-ENTMCNC: 35.2 G/DL (ref 31.5–35.7)
MCV RBC AUTO: 95.9 FL (ref 79–97)
MONOCYTES # BLD AUTO: 0.87 10*3/MM3 (ref 0.1–0.9)
MONOCYTES NFR BLD AUTO: 9.5 % (ref 5–12)
NEUTROPHILS NFR BLD AUTO: 4.53 10*3/MM3 (ref 1.7–7)
NEUTROPHILS NFR BLD AUTO: 49.7 % (ref 42.7–76)
NRBC BLD AUTO-RTO: 0 /100 WBC (ref 0–0.2)
PLATELET # BLD AUTO: 124 10*3/MM3 (ref 140–450)
PMV BLD AUTO: 12.1 FL (ref 6–12)
POST-BLOOD PRESSURE: NORMAL
PRE-BLOOD PRESSURE: NORMAL
PRE-HCT: 46.3
PRE-HGB: 16.3
PULSE: 56
RBC # BLD AUTO: 4.83 10*6/MM3 (ref 4.14–5.8)
VOLUME COLLECTED: NORMAL
WBC # BLD AUTO: 9.11 10*3/MM3 (ref 3.4–10.8)

## 2021-07-07 PROCEDURE — 99195 PHLEBOTOMY: CPT

## 2021-07-07 PROCEDURE — 36415 COLL VENOUS BLD VENIPUNCTURE: CPT

## 2021-07-07 PROCEDURE — 85025 COMPLETE CBC W/AUTO DIFF WBC: CPT

## 2021-07-07 PROCEDURE — 82728 ASSAY OF FERRITIN: CPT

## 2021-07-08 NOTE — TELEPHONE ENCOUNTER
Called and spoke to pt re: results-he VU. Pt advised to call me back with any further questions/problems, otherwise he will keep appt as scheduled for endo and phlebotomy in 2 weeks.

## 2021-07-12 ENCOUNTER — OFFICE VISIT (OUTPATIENT)
Dept: FAMILY MEDICINE CLINIC | Age: 66
End: 2021-07-12
Payer: COMMERCIAL

## 2021-07-12 VITALS
DIASTOLIC BLOOD PRESSURE: 74 MMHG | OXYGEN SATURATION: 98 % | WEIGHT: 203.5 LBS | HEIGHT: 67 IN | BODY MASS INDEX: 31.94 KG/M2 | HEART RATE: 63 BPM | SYSTOLIC BLOOD PRESSURE: 130 MMHG

## 2021-07-12 DIAGNOSIS — E83.110 HEREDITARY HEMOCHROMATOSIS (HCC): ICD-10-CM

## 2021-07-12 DIAGNOSIS — I10 ESSENTIAL HYPERTENSION: Primary | ICD-10-CM

## 2021-07-12 DIAGNOSIS — K21.9 GASTROESOPHAGEAL REFLUX DISEASE WITHOUT ESOPHAGITIS: ICD-10-CM

## 2021-07-12 DIAGNOSIS — R53.83 OTHER FATIGUE: ICD-10-CM

## 2021-07-12 DIAGNOSIS — Z12.5 ENCOUNTER FOR PROSTATE CANCER SCREENING: ICD-10-CM

## 2021-07-12 DIAGNOSIS — K74.60 CIRRHOSIS OF LIVER WITHOUT ASCITES, UNSPECIFIED HEPATIC CIRRHOSIS TYPE (HCC): ICD-10-CM

## 2021-07-12 DIAGNOSIS — Z13.220 LIPID SCREENING: ICD-10-CM

## 2021-07-12 PROCEDURE — G8427 DOCREV CUR MEDS BY ELIG CLIN: HCPCS | Performed by: FAMILY MEDICINE

## 2021-07-12 PROCEDURE — G8417 CALC BMI ABV UP PARAM F/U: HCPCS | Performed by: FAMILY MEDICINE

## 2021-07-12 PROCEDURE — 1036F TOBACCO NON-USER: CPT | Performed by: FAMILY MEDICINE

## 2021-07-12 PROCEDURE — 3017F COLORECTAL CA SCREEN DOC REV: CPT | Performed by: FAMILY MEDICINE

## 2021-07-12 PROCEDURE — 99213 OFFICE O/P EST LOW 20 MIN: CPT | Performed by: FAMILY MEDICINE

## 2021-07-12 ASSESSMENT — PATIENT HEALTH QUESTIONNAIRE - PHQ9
SUM OF ALL RESPONSES TO PHQ9 QUESTIONS 1 & 2: 0
2. FEELING DOWN, DEPRESSED OR HOPELESS: 0
SUM OF ALL RESPONSES TO PHQ QUESTIONS 1-9: 0
SUM OF ALL RESPONSES TO PHQ QUESTIONS 1-9: 0
1. LITTLE INTEREST OR PLEASURE IN DOING THINGS: 0
SUM OF ALL RESPONSES TO PHQ QUESTIONS 1-9: 0

## 2021-07-12 NOTE — PROGRESS NOTES
[facility] PHYSICIAN SERVICES  [facility]  [address] [address]  Dept: [phone]  Dept Fax: [phone]  Loc: [phone]    Castro Juárez is a 61 y.o. male who presents today for his medical conditions/complaints as noted below. Castro Juárez is here for 6 Month Follow-Up        HPI:   CC: Here today to discuss the following:    Recently observed to have nodular changes of the liver consistent with cirrhosis. Further testing obtained indicated a diagnosis of hemochromatosis. He has been followed by gastroenterology for phlebotomies. Hypertension  Compliant with medications. No adverse effects from medication. No lightheadedness, palpitations, or chest pain. Gastroesophageal Reflux Disease  Symptoms currently under control. Medication adequately controls his symptoms. No hematochezia or melena. HPI    Past Medical History:   Diagnosis Date    Hypertension     Joint pain       Past Surgical History:   Procedure Laterality Date    TONSILLECTOMY         Family History   Problem Relation Age of Onset    Diabetes Mother     High Blood Pressure Mother     Heart Disease Father        Social History     Tobacco Use    Smoking status: Never Smoker    Smokeless tobacco: Never Used   Substance Use Topics    Alcohol use: Yes     Current Outpatient Medications   Medication Sig Dispense Refill    diclofenac sodium (VOLTAREN) 1 % GEL Apply 2 g topically 2 times daily 200 g 5    omeprazole (PRILOSEC) 20 MG delayed release capsule Take 1 capsule by mouth daily as needed (GERD) 30 capsule 2    lisinopril (PRINIVIL;ZESTRIL) 20 MG tablet Take 1 tablet by mouth daily 90 tablet 3    albuterol sulfate  (90 Base) MCG/ACT inhaler Inhale 2 puffs into the lungs every 6 hours as needed for Wheezing 1 Inhaler 0    aspirin 325 MG EC tablet Take 325 mg by mouth daily       No current facility-administered medications for this visit.      Allergies Allergen Reactions    Codeine        Health Maintenance   Topic Date Due    Hepatitis C screen  Never done    Hepatitis A vaccine (1 of 2 - Risk 2-dose series) Never done    Pneumococcal 0-64 years Vaccine (1 of 2 - PPSV23) Never done    COVID-19 Vaccine (1) Never done    HIV screen  Never done    Hepatitis B vaccine (1 of 3 - Risk 3-dose series) Never done    Diabetes screen  Never done    Flu vaccine (1) 09/01/2021    Potassium monitoring  04/05/2022    Creatinine monitoring  04/05/2022    Colon cancer screen colonoscopy  02/24/2023    Lipid screen  12/16/2025    DTaP/Tdap/Td vaccine (2 - Td or Tdap) 04/12/2028    Shingles Vaccine  Completed    Hib vaccine  Aged Out    Meningococcal (ACWY) vaccine  Aged Out       Subjective:      Review of Systems  Review of Systems   Constitutional: Negative for chills and fever. HENT: Negative for congestion. Respiratory: Negative for cough, chest tightness and shortness of breath. Cardiovascular: Negative for chest pain, palpitations and leg swelling. Gastrointestinal: Negative for abdominal pain, anal bleeding, constipation, diarrhea and nausea. Genitourinary: Negative for difficulty urinating. Psychiatric/Behavioral: Negative. SeeHPI for visit specific review of symptoms. All others negative      Objective:   /74   Pulse 63   Ht 5' 7\" (1.702 m)   Wt 203 lb 8 oz (92.3 kg)   SpO2 98%   BMI 31.87 kg/m²   Physical Exam    Physical Exam   Constitutional: He appears well-developed. Does not appear ill. Neck: Normal range of motion. Neck supple. No masses. Neck Symmetric. Normal tracheal position. No thyroid enlargement  Cardiovascular: Normal rate and regular rhythm. Exam reveals no friction rub. Carotid arteries: no bruit observed. No murmur heard. Respiratory:  Effort normal and breath sounds normal. No respiratory distress. No wheezes. No rales. No use of accessory muscles or intercostal retractions.   Neurological: alert.   Psychiatric: normal mood and affect. His behavior is normal. Normal judgement and insight observed. No results found for this or any previous visit (from the past 672 hour(s)). Assessment & Plan: The following diagnoses and conditions are stable with no further orders unless indicated:  1. Essential hypertension  BP Readings from Last 3 Encounters:   07/12/21 130/74   05/03/21 136/86   04/09/21 136/88     Blood pressure stable  - CBC Auto Differential; Future    2. Gastroesophageal reflux disease without esophagitis  Stable    3. Cirrhosis of liver without ascites, unspecified hepatic cirrhosis type ([facility] [address]  Continue with recommendations per gastroenterology for management of hemochromatosis    4. Encounter for prostate cancer screening    - PSA screening; Future    5. Lipid screening    - Lipid Panel; Future    6. Other fatigue    - Comprehensive Metabolic Panel; Future  - T4, Free; Future  - TSH without Reflex; Future            No follow-ups on file. Discussed use, benefit, and side effects of prescribed medications. All patient questions answered. Pt voiced understanding. Reviewed health maintenance. Instructedto continue current medications, diet and exercise. Patient agreed with treatmentplan. Follow up as directed. Note dictated using Dragon Dictation software  Sometimes this dictation software makes erroneous transcriptions.

## 2021-07-14 ENCOUNTER — TRANSCRIBE ORDERS (OUTPATIENT)
Dept: LAB | Facility: HOSPITAL | Age: 66
End: 2021-07-14

## 2021-07-14 DIAGNOSIS — Z01.818 PREOPERATIVE TESTING: Primary | ICD-10-CM

## 2021-07-16 ENCOUNTER — LAB (OUTPATIENT)
Dept: LAB | Facility: HOSPITAL | Age: 66
End: 2021-07-16

## 2021-07-16 LAB — SARS-COV-2 ORF1AB RESP QL NAA+PROBE: NOT DETECTED

## 2021-07-16 PROCEDURE — U0004 COV-19 TEST NON-CDC HGH THRU: HCPCS | Performed by: INTERNAL MEDICINE

## 2021-07-16 PROCEDURE — C9803 HOPD COVID-19 SPEC COLLECT: HCPCS | Performed by: INTERNAL MEDICINE

## 2021-07-17 PROBLEM — E83.110 HEREDITARY HEMOCHROMATOSIS (HCC): Status: ACTIVE | Noted: 2021-07-17

## 2021-07-17 PROBLEM — K74.60 CIRRHOSIS OF LIVER WITHOUT ASCITES (HCC): Status: ACTIVE | Noted: 2021-07-17

## 2021-07-19 ENCOUNTER — ANESTHESIA (OUTPATIENT)
Dept: GASTROENTEROLOGY | Facility: HOSPITAL | Age: 66
End: 2021-07-19

## 2021-07-19 ENCOUNTER — ANESTHESIA EVENT (OUTPATIENT)
Dept: GASTROENTEROLOGY | Facility: HOSPITAL | Age: 66
End: 2021-07-19

## 2021-07-19 ENCOUNTER — TELEPHONE (OUTPATIENT)
Dept: GASTROENTEROLOGY | Facility: CLINIC | Age: 66
End: 2021-07-19

## 2021-07-19 ENCOUNTER — PREP FOR SURGERY (OUTPATIENT)
Dept: OTHER | Facility: HOSPITAL | Age: 66
End: 2021-07-19

## 2021-07-19 ENCOUNTER — HOSPITAL ENCOUNTER (OUTPATIENT)
Facility: HOSPITAL | Age: 66
Setting detail: HOSPITAL OUTPATIENT SURGERY
Discharge: HOME | End: 2021-07-19
Attending: INTERNAL MEDICINE | Admitting: INTERNAL MEDICINE

## 2021-07-19 VITALS
TEMPERATURE: 97.7 F | SYSTOLIC BLOOD PRESSURE: 115 MMHG | OXYGEN SATURATION: 97 % | WEIGHT: 200 LBS | BODY MASS INDEX: 31.39 KG/M2 | HEIGHT: 67 IN | DIASTOLIC BLOOD PRESSURE: 75 MMHG | RESPIRATION RATE: 16 BRPM | HEART RATE: 57 BPM

## 2021-07-19 DIAGNOSIS — E83.110 HEREDITARY HEMOCHROMATOSIS (HCC): ICD-10-CM

## 2021-07-19 DIAGNOSIS — K25.3 ACUTE GASTRIC ULCER WITHOUT HEMORRHAGE OR PERFORATION: ICD-10-CM

## 2021-07-19 DIAGNOSIS — R93.2 ABNORMAL FINDING ON IMAGING OF LIVER: ICD-10-CM

## 2021-07-19 DIAGNOSIS — K25.3 ACUTE GASTRIC EROSION: Primary | ICD-10-CM

## 2021-07-19 PROBLEM — B96.81 HELICOBACTER PYLORI GASTRITIS: Status: ACTIVE | Noted: 2021-07-19

## 2021-07-19 PROBLEM — K29.70 HELICOBACTER PYLORI GASTRITIS: Status: ACTIVE | Noted: 2021-07-19

## 2021-07-19 LAB — UREASE TISS QL: POSITIVE

## 2021-07-19 PROCEDURE — 25010000002 PROPOFOL 10 MG/ML EMULSION: Performed by: NURSE ANESTHETIST, CERTIFIED REGISTERED

## 2021-07-19 PROCEDURE — 43239 EGD BIOPSY SINGLE/MULTIPLE: CPT | Performed by: INTERNAL MEDICINE

## 2021-07-19 PROCEDURE — 87081 CULTURE SCREEN ONLY: CPT | Performed by: INTERNAL MEDICINE

## 2021-07-19 RX ORDER — LIDOCAINE HYDROCHLORIDE 20 MG/ML
INJECTION, SOLUTION EPIDURAL; INFILTRATION; INTRACAUDAL; PERINEURAL AS NEEDED
Status: DISCONTINUED | OUTPATIENT
Start: 2021-07-19 | End: 2021-07-19 | Stop reason: SURG

## 2021-07-19 RX ORDER — PANTOPRAZOLE SODIUM 40 MG/1
40 TABLET, DELAYED RELEASE ORAL DAILY
Qty: 30 TABLET | Refills: 11 | Status: SHIPPED | OUTPATIENT
Start: 2021-07-19

## 2021-07-19 RX ORDER — LIDOCAINE HYDROCHLORIDE 10 MG/ML
0.5 INJECTION, SOLUTION EPIDURAL; INFILTRATION; INTRACAUDAL; PERINEURAL ONCE AS NEEDED
Status: CANCELLED | OUTPATIENT
Start: 2021-07-19

## 2021-07-19 RX ORDER — PANTOPRAZOLE SODIUM 40 MG/1
40 TABLET, DELAYED RELEASE ORAL DAILY
Qty: 14 TABLET | Refills: 0 | Status: SHIPPED | OUTPATIENT
Start: 2021-07-19 | End: 2021-08-02

## 2021-07-19 RX ORDER — SODIUM CHLORIDE 9 MG/ML
500 INJECTION, SOLUTION INTRAVENOUS CONTINUOUS PRN
Status: DISCONTINUED | OUTPATIENT
Start: 2021-07-19 | End: 2021-07-19 | Stop reason: HOSPADM

## 2021-07-19 RX ORDER — AMOXICILLIN 500 MG/1
1000 TABLET, FILM COATED ORAL 2 TIMES DAILY
Qty: 56 TABLET | Refills: 0 | Status: SHIPPED | OUTPATIENT
Start: 2021-07-19 | End: 2021-08-02

## 2021-07-19 RX ORDER — PROPOFOL 10 MG/ML
VIAL (ML) INTRAVENOUS AS NEEDED
Status: DISCONTINUED | OUTPATIENT
Start: 2021-07-19 | End: 2021-07-19 | Stop reason: SURG

## 2021-07-19 RX ORDER — CLARITHROMYCIN 500 MG/1
500 TABLET, COATED ORAL 2 TIMES DAILY
Qty: 28 TABLET | Refills: 0 | Status: SHIPPED | OUTPATIENT
Start: 2021-07-19 | End: 2021-08-02

## 2021-07-19 RX ORDER — BISMUTH SUBSALICYLATE 262 MG/1
524 TABLET, CHEWABLE ORAL
Qty: 112 TABLET | Refills: 0 | Status: SHIPPED | OUTPATIENT
Start: 2021-07-19 | End: 2021-08-02

## 2021-07-19 RX ORDER — SODIUM CHLORIDE 0.9 % (FLUSH) 0.9 %
10 SYRINGE (ML) INJECTION AS NEEDED
Status: DISCONTINUED | OUTPATIENT
Start: 2021-07-19 | End: 2021-07-19 | Stop reason: HOSPADM

## 2021-07-19 RX ADMIN — PROPOFOL 150 MG: 10 INJECTION, EMULSION INTRAVENOUS at 08:59

## 2021-07-19 RX ADMIN — LIDOCAINE HYDROCHLORIDE 200 MG: 20 INJECTION, SOLUTION EPIDURAL; INFILTRATION; INTRACAUDAL; PERINEURAL at 08:59

## 2021-07-19 RX ADMIN — SODIUM CHLORIDE 500 ML: 9 INJECTION, SOLUTION INTRAVENOUS at 08:04

## 2021-07-19 NOTE — ANESTHESIA POSTPROCEDURE EVALUATION
"Patient: Frankie Reid    Procedure Summary     Date: 07/19/21 Room / Location:  [facility] /  [facility]    Anesthesia Start: 0855 Anesthesia Stop: 0911    Procedure: ESOPHAGOGASTRODUODENOSCOPY WITH ANESTHESIA (N/A ) Diagnosis:       Hereditary hemochromatosis (CMS/HCC)      Abnormal finding on imaging of liver      (Hereditary hemochromatosis (CMS/HCC) [E83.110])      (Abnormal finding on imaging of liver [R93.2])    Surgeons: Dorothea Sales MD Provider: Chelsi Araujo CRNA    Anesthesia Type: MAC ASA Status: 2          Anesthesia Type: MAC    Vitals  Vitals Value Taken Time   /76 07/19/21 0915   Temp     Pulse 54 07/19/21 0919   Resp 16 07/19/21 0911   SpO2 93 % 07/19/21 0919   Vitals shown include unvalidated device data.        Post Anesthesia Care and Evaluation    Patient location during evaluation: PHASE II  Patient participation: complete - patient participated  Level of consciousness: awake and alert  Pain management: adequate  Airway patency: patent  Anesthetic complications: No anesthetic complications  PONV Status: none  Cardiovascular status: acceptable  Respiratory status: acceptable  Hydration status: acceptable    Comments: /78 (Patient Position: Lying)   Pulse 59   Temp 97.7 °F (36.5 °C) (Temporal)   Resp 16   Ht 170.2 cm (67\")   Wt 90.7 kg (200 lb)   SpO2 94%   BMI 31.32 kg/m²         "

## 2021-07-19 NOTE — ANESTHESIA PREPROCEDURE EVALUATION
Anesthesia Evaluation     Patient summary reviewed   no history of anesthetic complications:  NPO Solid Status: > 8 hours  NPO Liquid Status: > 8 hours           Airway   Mallampati: II  TM distance: >3 FB  Neck ROM: full  Dental    (+) upper dentures and lower dentures    Pulmonary - negative pulmonary ROS   Cardiovascular   Exercise tolerance: good (4-7 METS)    (+) hypertension,       Neuro/Psych- negative ROS  GI/Hepatic/Renal/Endo    (+)  GERD,  liver disease (hemochromatosis, phlebotomy q2 weeks),     Musculoskeletal     Abdominal    Substance History      OB/GYN          Other                        Anesthesia Plan    ASA 2     MAC     intravenous induction     Anesthetic plan, all risks, benefits, and alternatives have been provided, discussed and informed consent has been obtained with: patient.

## 2021-07-19 NOTE — TELEPHONE ENCOUNTER
No.   He doesn't need to limit anything.  The ulcer was small and should heal up nicely and quickly.    Dorothea Sales MD

## 2021-07-19 NOTE — TELEPHONE ENCOUNTER
Pt had endo this morning and sent a [employer] message asking if there were any juices he needed to stay away from? I see he had an ulcer and just wanted to double check if there were any foods/beverages he should avoid-aside from those containing caffeine? Thanks!

## 2021-07-19 NOTE — TELEPHONE ENCOUNTER
These let pt know that H. pylori test was positive.  I will need to treat this with antibiotics.  This could be the cause for the erosions and ulcer that I saw today.    I have sent prescriptions to the pharmacy.    · Biaxin twice a day for 14 days  · Amoxicillin 2 tablets twice a day for 14 days  · Protonix twice a day for 14 days--I had just given him a prescription for once a day.  I will send an additional 14 pills so he can take them twice a day for the 2 weeks.  · Pepto-Bismol 2 tablets 4 times a day for 14 days    He will have a repeat endoscopy in 2 months.  I will recheck for the infection at that time with bxs.    Dorothea Sales MD

## 2021-07-19 NOTE — TELEPHONE ENCOUNTER
Called and spoke to pt re: results-he VU. I have sent him a copy of the message through [employer] as a backup. Pt advised to call me back with any further questions/problems.

## 2021-07-19 NOTE — DISCHARGE INSTRUCTIONS
Stop omeprazole 20mg and start pantroprazole 40 mg daily on empty stomach.    Repeat endo in 2 months to check healing. Repeat on sept 20 at 7:30, office will mail instructions

## 2021-07-21 ENCOUNTER — TELEPHONE (OUTPATIENT)
Dept: GASTROENTEROLOGY | Facility: CLINIC | Age: 66
End: 2021-07-21

## 2021-07-21 ENCOUNTER — LAB (OUTPATIENT)
Dept: LAB | Facility: HOSPITAL | Age: 66
End: 2021-07-21

## 2021-07-21 DIAGNOSIS — E83.110 HEREDITARY HEMOCHROMATOSIS (HCC): ICD-10-CM

## 2021-07-21 LAB
BASOPHILS # BLD AUTO: 0.04 10*3/MM3 (ref 0–0.2)
BASOPHILS NFR BLD AUTO: 0.6 % (ref 0–1.5)
DEPRECATED RDW RBC AUTO: 43.6 FL (ref 37–54)
EOSINOPHIL # BLD AUTO: 0.37 10*3/MM3 (ref 0–0.4)
EOSINOPHIL NFR BLD AUTO: 5.2 % (ref 0.3–6.2)
ERYTHROCYTE [DISTWIDTH] IN BLOOD BY AUTOMATED COUNT: 12.7 % (ref 12.3–15.4)
FERRITIN SERPL-MCNC: 618.5 NG/ML (ref 30–400)
HCT VFR BLD AUTO: 42.1 % (ref 37.5–51)
HGB BLD-MCNC: 15 G/DL (ref 13–17.7)
IMM GRANULOCYTES # BLD AUTO: 0.07 10*3/MM3 (ref 0–0.05)
IMM GRANULOCYTES NFR BLD AUTO: 1 % (ref 0–0.5)
LYMPHOCYTES # BLD AUTO: 2.97 10*3/MM3 (ref 0.7–3.1)
LYMPHOCYTES NFR BLD AUTO: 41.8 % (ref 19.6–45.3)
Lab: NORMAL
MCH RBC QN AUTO: 33.8 PG (ref 26.6–33)
MCHC RBC AUTO-ENTMCNC: 35.6 G/DL (ref 31.5–35.7)
MCV RBC AUTO: 94.8 FL (ref 79–97)
MONOCYTES # BLD AUTO: 0.43 10*3/MM3 (ref 0.1–0.9)
MONOCYTES NFR BLD AUTO: 6 % (ref 5–12)
NEUTROPHILS NFR BLD AUTO: 3.23 10*3/MM3 (ref 1.7–7)
NEUTROPHILS NFR BLD AUTO: 45.4 % (ref 42.7–76)
NRBC BLD AUTO-RTO: 0 /100 WBC (ref 0–0.2)
PLATELET # BLD AUTO: 133 10*3/MM3 (ref 140–450)
PMV BLD AUTO: 12.3 FL (ref 6–12)
POST-BLOOD PRESSURE: NORMAL
PRE-BLOOD PRESSURE: NORMAL
PRE-HCT: 42.1
PRE-HGB: 15
PULSE: 57
RBC # BLD AUTO: 4.44 10*6/MM3 (ref 4.14–5.8)
VOLUME COLLECTED: NORMAL
WBC # BLD AUTO: 7.11 10*3/MM3 (ref 3.4–10.8)

## 2021-07-21 PROCEDURE — 85025 COMPLETE CBC W/AUTO DIFF WBC: CPT

## 2021-07-21 PROCEDURE — 99195 PHLEBOTOMY: CPT

## 2021-07-21 PROCEDURE — 36415 COLL VENOUS BLD VENIPUNCTURE: CPT

## 2021-07-21 PROCEDURE — 82728 ASSAY OF FERRITIN: CPT

## 2021-07-21 NOTE — TELEPHONE ENCOUNTER
Called pt re: results-was unable to reach him so I left detailed VM with results. Pt advised to call me back with any further questions/problems.

## 2021-08-04 ENCOUNTER — LAB (OUTPATIENT)
Dept: LAB | Facility: HOSPITAL | Age: 66
End: 2021-08-04

## 2021-08-04 ENCOUNTER — TELEPHONE (OUTPATIENT)
Dept: GASTROENTEROLOGY | Facility: CLINIC | Age: 66
End: 2021-08-04

## 2021-08-04 DIAGNOSIS — E83.110 HEREDITARY HEMOCHROMATOSIS (HCC): ICD-10-CM

## 2021-08-04 LAB
BASOPHILS # BLD AUTO: 0.05 10*3/MM3 (ref 0–0.2)
BASOPHILS NFR BLD AUTO: 0.7 % (ref 0–1.5)
DEPRECATED RDW RBC AUTO: 45.2 FL (ref 37–54)
EOSINOPHIL # BLD AUTO: 0.25 10*3/MM3 (ref 0–0.4)
EOSINOPHIL NFR BLD AUTO: 3.7 % (ref 0.3–6.2)
ERYTHROCYTE [DISTWIDTH] IN BLOOD BY AUTOMATED COUNT: 12.6 % (ref 12.3–15.4)
FERRITIN SERPL-MCNC: 433.9 NG/ML (ref 30–400)
HCT VFR BLD AUTO: 42.6 % (ref 37.5–51)
HGB BLD-MCNC: 14.7 G/DL (ref 13–17.7)
LYMPHOCYTES # BLD AUTO: 3.01 10*3/MM3 (ref 0.7–3.1)
LYMPHOCYTES NFR BLD AUTO: 44 % (ref 19.6–45.3)
Lab: NORMAL
MCH RBC QN AUTO: 33.3 PG (ref 26.6–33)
MCHC RBC AUTO-ENTMCNC: 34.5 G/DL (ref 31.5–35.7)
MCV RBC AUTO: 96.6 FL (ref 79–97)
MONOCYTES # BLD AUTO: 0.51 10*3/MM3 (ref 0.1–0.9)
MONOCYTES NFR BLD AUTO: 7.5 % (ref 5–12)
NEUTROPHILS NFR BLD AUTO: 3 10*3/MM3 (ref 1.7–7)
NEUTROPHILS NFR BLD AUTO: 43.8 % (ref 42.7–76)
PLATELET # BLD AUTO: 137 10*3/MM3 (ref 140–450)
PMV BLD AUTO: 12.1 FL (ref 6–12)
POST-BLOOD PRESSURE: NORMAL
PRE-BLOOD PRESSURE: NORMAL
PRE-HCT: 42.4
PRE-HGB: 14.8
PULSE: 55
RBC # BLD AUTO: 4.41 10*6/MM3 (ref 4.14–5.8)
VOLUME COLLECTED: 500
WBC # BLD AUTO: 6.84 10*3/MM3 (ref 3.4–10.8)

## 2021-08-04 PROCEDURE — 99195 PHLEBOTOMY: CPT

## 2021-08-04 PROCEDURE — 36415 COLL VENOUS BLD VENIPUNCTURE: CPT

## 2021-08-04 PROCEDURE — 85025 COMPLETE CBC W/AUTO DIFF WBC: CPT

## 2021-08-04 PROCEDURE — 82728 ASSAY OF FERRITIN: CPT

## 2021-08-04 NOTE — TELEPHONE ENCOUNTER
Please inform his ferritin is dropping nicely with phlebotomy.  Ferritin is down to 433.  Continue with every 2-week phlebotomies.    Dorothea Sales MD

## 2021-08-05 NOTE — TELEPHONE ENCOUNTER
Tried to call pt again re: results-was unable to reach him again so I left detailed VM for him with results. Pt was advised on VM to call me back with any further questions/problems.

## 2021-08-18 ENCOUNTER — LAB (OUTPATIENT)
Dept: LAB | Facility: HOSPITAL | Age: 66
End: 2021-08-18

## 2021-08-18 DIAGNOSIS — E83.110 HEREDITARY HEMOCHROMATOSIS (HCC): ICD-10-CM

## 2021-08-18 LAB
BASOPHILS # BLD AUTO: 0.04 10*3/MM3 (ref 0–0.2)
BASOPHILS NFR BLD AUTO: 0.5 % (ref 0–1.5)
DEPRECATED RDW RBC AUTO: 43.8 FL (ref 37–54)
EOSINOPHIL # BLD AUTO: 0.37 10*3/MM3 (ref 0–0.4)
EOSINOPHIL NFR BLD AUTO: 5 % (ref 0.3–6.2)
ERYTHROCYTE [DISTWIDTH] IN BLOOD BY AUTOMATED COUNT: 12.5 % (ref 12.3–15.4)
FERRITIN SERPL-MCNC: 308.4 NG/ML (ref 30–400)
HCT VFR BLD AUTO: 41.9 % (ref 37.5–51)
HGB BLD-MCNC: 14.6 G/DL (ref 13–17.7)
LYMPHOCYTES # BLD AUTO: 3.36 10*3/MM3 (ref 0.7–3.1)
LYMPHOCYTES NFR BLD AUTO: 45.3 % (ref 19.6–45.3)
Lab: NORMAL
MCH RBC QN AUTO: 33.3 PG (ref 26.6–33)
MCHC RBC AUTO-ENTMCNC: 34.8 G/DL (ref 31.5–35.7)
MCV RBC AUTO: 95.7 FL (ref 79–97)
MONOCYTES # BLD AUTO: 0.64 10*3/MM3 (ref 0.1–0.9)
MONOCYTES NFR BLD AUTO: 8.6 % (ref 5–12)
NEUTROPHILS NFR BLD AUTO: 2.95 10*3/MM3 (ref 1.7–7)
NEUTROPHILS NFR BLD AUTO: 39.9 % (ref 42.7–76)
PLATELET # BLD AUTO: 143 10*3/MM3 (ref 140–450)
PMV BLD AUTO: 11.9 FL (ref 6–12)
POST-BLOOD PRESSURE: NORMAL
PRE-BLOOD PRESSURE: NORMAL
PRE-HCT: 41.9
PRE-HGB: 14.6
PULSE: 53
RBC # BLD AUTO: 4.38 10*6/MM3 (ref 4.14–5.8)
VOLUME COLLECTED: 500
WBC # BLD AUTO: 7.41 10*3/MM3 (ref 3.4–10.8)

## 2021-08-18 PROCEDURE — 85025 COMPLETE CBC W/AUTO DIFF WBC: CPT

## 2021-08-18 PROCEDURE — 36415 COLL VENOUS BLD VENIPUNCTURE: CPT

## 2021-08-18 PROCEDURE — 99195 PHLEBOTOMY: CPT

## 2021-08-18 PROCEDURE — 82728 ASSAY OF FERRITIN: CPT

## 2021-08-19 ENCOUNTER — TELEPHONE (OUTPATIENT)
Dept: GASTROENTEROLOGY | Facility: CLINIC | Age: 66
End: 2021-08-19

## 2021-08-19 NOTE — TELEPHONE ENCOUNTER
Please inform his ferritin is dropping nicely with phlebotomy.  Ferritin is down to 308.  Continue with every 2-week phlebotomies.     Dorothea Sales MD

## 2021-08-20 NOTE — TELEPHONE ENCOUNTER
Tried to call pt re: results-was unable to reach him so I left detailed VM with results for him. Pt advised on VM to call me back with any further questions/problems.

## 2021-09-01 ENCOUNTER — LAB (OUTPATIENT)
Dept: LAB | Facility: HOSPITAL | Age: 66
End: 2021-09-01

## 2021-09-01 DIAGNOSIS — E83.110 HEREDITARY HEMOCHROMATOSIS (HCC): ICD-10-CM

## 2021-09-01 LAB
BASOPHILS # BLD AUTO: 0.04 10*3/MM3 (ref 0–0.2)
BASOPHILS NFR BLD AUTO: 0.5 % (ref 0–1.5)
DEPRECATED RDW RBC AUTO: 43.7 FL (ref 37–54)
EOSINOPHIL # BLD AUTO: 0.38 10*3/MM3 (ref 0–0.4)
EOSINOPHIL NFR BLD AUTO: 5.1 % (ref 0.3–6.2)
ERYTHROCYTE [DISTWIDTH] IN BLOOD BY AUTOMATED COUNT: 12.4 % (ref 12.3–15.4)
FERRITIN SERPL-MCNC: 233 NG/ML (ref 30–400)
HCT VFR BLD AUTO: 42.4 % (ref 37.5–51)
HGB BLD-MCNC: 14.6 G/DL (ref 13–17.7)
IMM GRANULOCYTES # BLD AUTO: 0.04 10*3/MM3 (ref 0–0.05)
IMM GRANULOCYTES NFR BLD AUTO: 0.5 % (ref 0–0.5)
LYMPHOCYTES # BLD AUTO: 3.17 10*3/MM3 (ref 0.7–3.1)
LYMPHOCYTES NFR BLD AUTO: 42.2 % (ref 19.6–45.3)
Lab: NORMAL
MCH RBC QN AUTO: 33.1 PG (ref 26.6–33)
MCHC RBC AUTO-ENTMCNC: 34.4 G/DL (ref 31.5–35.7)
MCV RBC AUTO: 96.1 FL (ref 79–97)
MONOCYTES # BLD AUTO: 0.63 10*3/MM3 (ref 0.1–0.9)
MONOCYTES NFR BLD AUTO: 8.4 % (ref 5–12)
NEUTROPHILS NFR BLD AUTO: 3.25 10*3/MM3 (ref 1.7–7)
NEUTROPHILS NFR BLD AUTO: 43.3 % (ref 42.7–76)
NRBC BLD AUTO-RTO: 0 /100 WBC (ref 0–0.2)
PLATELET # BLD AUTO: 127 10*3/MM3 (ref 140–450)
PMV BLD AUTO: 12 FL (ref 6–12)
POST-BLOOD PRESSURE: NORMAL
PRE-BLOOD PRESSURE: NORMAL
PRE-HCT: 42.4
PRE-HGB: 14.6
PULSE: 51
RBC # BLD AUTO: 4.41 10*6/MM3 (ref 4.14–5.8)
VOLUME COLLECTED: 500
WBC # BLD AUTO: 7.51 10*3/MM3 (ref 3.4–10.8)

## 2021-09-01 PROCEDURE — 36415 COLL VENOUS BLD VENIPUNCTURE: CPT

## 2021-09-01 PROCEDURE — 82728 ASSAY OF FERRITIN: CPT

## 2021-09-01 PROCEDURE — 99195 PHLEBOTOMY: CPT

## 2021-09-01 PROCEDURE — 85025 COMPLETE CBC W/AUTO DIFF WBC: CPT

## 2021-09-02 ENCOUNTER — TELEPHONE (OUTPATIENT)
Dept: GASTROENTEROLOGY | Facility: CLINIC | Age: 66
End: 2021-09-02

## 2021-09-02 NOTE — TELEPHONE ENCOUNTER
Please inform that his ferritins are dropping nicely.  He is down to 308.  Lets change his phlebotomy to every 4 weeks instead of every 2 weeks.    Dorothea Sales MD

## 2021-09-03 NOTE — TELEPHONE ENCOUNTER
Pt returned my call re: results-he VU. He has already scheduled his repeat phlebotomy for 2 weeks. I am going to call lab and fax new phlebotomy form to them-he was advised they should call him to r/s. Pt advised to call me back with any further questions/problems.

## 2021-09-14 ENCOUNTER — TRANSCRIBE ORDERS (OUTPATIENT)
Dept: LAB | Facility: HOSPITAL | Age: 66
End: 2021-09-14

## 2021-09-14 DIAGNOSIS — Z01.818 PREOPERATIVE TESTING: Primary | ICD-10-CM

## 2021-09-15 ENCOUNTER — TRANSCRIBE ORDERS (OUTPATIENT)
Dept: LAB | Facility: HOSPITAL | Age: 66
End: 2021-09-15

## 2021-09-15 DIAGNOSIS — Z01.818 PREOPERATIVE TESTING: Primary | ICD-10-CM

## 2021-09-17 ENCOUNTER — LAB (OUTPATIENT)
Dept: LAB | Facility: HOSPITAL | Age: 66
End: 2021-09-17

## 2021-09-17 LAB — SARS-COV-2 ORF1AB RESP QL NAA+PROBE: NOT DETECTED

## 2021-09-17 PROCEDURE — C9803 HOPD COVID-19 SPEC COLLECT: HCPCS | Performed by: INTERNAL MEDICINE

## 2021-09-17 PROCEDURE — U0004 COV-19 TEST NON-CDC HGH THRU: HCPCS | Performed by: INTERNAL MEDICINE

## 2021-09-20 ENCOUNTER — ANESTHESIA EVENT (OUTPATIENT)
Dept: GASTROENTEROLOGY | Facility: HOSPITAL | Age: 66
End: 2021-09-20

## 2021-09-20 ENCOUNTER — ANESTHESIA (OUTPATIENT)
Dept: GASTROENTEROLOGY | Facility: HOSPITAL | Age: 66
End: 2021-09-20

## 2021-09-20 ENCOUNTER — HOSPITAL ENCOUNTER (OUTPATIENT)
Facility: HOSPITAL | Age: 66
Setting detail: HOSPITAL OUTPATIENT SURGERY
Discharge: HOME | End: 2021-09-20
Attending: INTERNAL MEDICINE | Admitting: INTERNAL MEDICINE

## 2021-09-20 VITALS
DIASTOLIC BLOOD PRESSURE: 76 MMHG | BODY MASS INDEX: 31.23 KG/M2 | TEMPERATURE: 98.1 F | SYSTOLIC BLOOD PRESSURE: 126 MMHG | RESPIRATION RATE: 18 BRPM | OXYGEN SATURATION: 94 % | HEART RATE: 53 BPM | WEIGHT: 199 LBS | HEIGHT: 67 IN

## 2021-09-20 DIAGNOSIS — K25.3 ACUTE GASTRIC ULCER WITHOUT HEMORRHAGE OR PERFORATION: ICD-10-CM

## 2021-09-20 DIAGNOSIS — K25.3 ACUTE GASTRIC EROSION: ICD-10-CM

## 2021-09-20 PROCEDURE — 25010000002 PROPOFOL 10 MG/ML EMULSION: Performed by: NURSE ANESTHETIST, CERTIFIED REGISTERED

## 2021-09-20 PROCEDURE — 43239 EGD BIOPSY SINGLE/MULTIPLE: CPT | Performed by: INTERNAL MEDICINE

## 2021-09-20 PROCEDURE — 87081 CULTURE SCREEN ONLY: CPT | Performed by: INTERNAL MEDICINE

## 2021-09-20 PROCEDURE — 88305 TISSUE EXAM BY PATHOLOGIST: CPT | Performed by: INTERNAL MEDICINE

## 2021-09-20 RX ORDER — SODIUM CHLORIDE 9 MG/ML
500 INJECTION, SOLUTION INTRAVENOUS CONTINUOUS PRN
Status: DISCONTINUED | OUTPATIENT
Start: 2021-09-20 | End: 2021-09-20 | Stop reason: HOSPADM

## 2021-09-20 RX ORDER — PROPOFOL 10 MG/ML
VIAL (ML) INTRAVENOUS AS NEEDED
Status: DISCONTINUED | OUTPATIENT
Start: 2021-09-20 | End: 2021-09-20 | Stop reason: SURG

## 2021-09-20 RX ORDER — SODIUM CHLORIDE 0.9 % (FLUSH) 0.9 %
10 SYRINGE (ML) INJECTION AS NEEDED
Status: DISCONTINUED | OUTPATIENT
Start: 2021-09-20 | End: 2021-09-20 | Stop reason: HOSPADM

## 2021-09-20 RX ORDER — LIDOCAINE HYDROCHLORIDE 10 MG/ML
0.5 INJECTION, SOLUTION EPIDURAL; INFILTRATION; INTRACAUDAL; PERINEURAL ONCE AS NEEDED
Status: CANCELLED | OUTPATIENT
Start: 2021-09-20

## 2021-09-20 RX ORDER — LIDOCAINE HYDROCHLORIDE 20 MG/ML
INJECTION, SOLUTION EPIDURAL; INFILTRATION; INTRACAUDAL; PERINEURAL AS NEEDED
Status: DISCONTINUED | OUTPATIENT
Start: 2021-09-20 | End: 2021-09-20 | Stop reason: SURG

## 2021-09-20 RX ADMIN — SODIUM CHLORIDE 500 ML: 9 INJECTION, SOLUTION INTRAVENOUS at 08:27

## 2021-09-20 RX ADMIN — LIDOCAINE HYDROCHLORIDE 50 MG: 20 INJECTION, SOLUTION EPIDURAL; INFILTRATION; INTRACAUDAL; PERINEURAL at 09:28

## 2021-09-20 RX ADMIN — PROPOFOL 150 MG: 10 INJECTION, EMULSION INTRAVENOUS at 09:28

## 2021-09-20 NOTE — ANESTHESIA POSTPROCEDURE EVALUATION
Patient: Frankie Reid    Procedure Summary     Date: 09/20/21 Room / Location: [facility] ENDOSCOPY 4 / [facility]    Anesthesia Start: 0925 Anesthesia Stop: 0936    Procedure: ESOPHAGOGASTRODUODENOSCOPY WITH ANESTHESIA (N/A ) Diagnosis:       Acute gastric erosion      Acute gastric ulcer without hemorrhage or perforation      (Acute gastric erosion [K25.3])      (Acute gastric ulcer without hemorrhage or perforation [K25.3])    Surgeons: Dorothea Sales MD Provider: Greyson Dewitt CRNA    Anesthesia Type: MAC ASA Status: 2          Anesthesia Type: MAC    Vitals  Vitals Value Taken Time   /80 09/20/21 0946   Temp     Pulse 56 09/20/21 0949   Resp 17 09/20/21 0940   SpO2 92 % 09/20/21 0949   Vitals shown include unvalidated device data.        Post Anesthesia Care and Evaluation    Patient location during evaluation: PHASE II  Patient participation: complete - patient participated  Level of consciousness: awake  Pain management: adequate  Airway patency: patent  Anesthetic complications: No anesthetic complications  PONV Status: none  Cardiovascular status: acceptable  Respiratory status: acceptable  Hydration status: acceptable  No anesthesia care post op

## 2021-09-20 NOTE — H&P
Chief Complaint:   Gastric ulcers    Subjective     HPI:   He had a gastric ulcer and erosions seen on endoscopy July 2021.  H. pylori was positive and he was treated.  He was prescribed pantoprazole but he stopped it on his own a couple weeks ago.    Past Medical History:   Past Medical History:   Diagnosis Date   • Arthritis    • Diverticulosis    • ED (erectile dysfunction)    • Family history of colonic polyps    • GERD (gastroesophageal reflux disease)    • History of adenomatous polyp of colon    • History of colon polyps    • Hypertension        Past Surgical History:  Past Surgical History:   Procedure Laterality Date   • COLONOSCOPY N/A 2/10/2017    One 7mm sessile serated adenomatous polyp in the ascending colon; Two 5-7mm adenomatous polyps in the transverse colon; One 4mm hyperplastic polyp in the rectum; Repeat 3 years   • COLONOSCOPY  10/25/2012    One 6mm adenomatous polyp in the sigmoid colon; Repeat 4 years   • COLONOSCOPY N/A 2/24/2020    Diverticulosis in the left colon; The examination was otherwise normal on direct and retroflexion views; No specimens collected; Repeat 5 years   • ENDOSCOPY N/A 7/19/2021    Procedure: ESOPHAGOGASTRODUODENOSCOPY WITH ANESTHESIA;  Surgeon: Dorothea Sales MD;  Location: [facility] ENDOSCOPY;  Service: Gastroenterology;  Laterality: N/A;  pre: abn imaging of liver  post: gastric erosions   Ermias Monteiro MD       • MULTIPLE TOOTH EXTRACTIONS     • TONSILLECTOMY         Family History:  Family History   Problem Relation Age of Onset   • Colon polyps Sister         < 60 years of age   • No Known Problems Mother    • Heart disease Father    • Colon cancer Neg Hx    • Esophageal cancer Neg Hx    • Liver cancer Neg Hx    • Liver disease Neg Hx    • Rectal cancer Neg Hx    • Stomach cancer Neg Hx        Social History:   reports that he has never smoked. He has never used smokeless tobacco. He reports previous alcohol use. He reports that he does not use  "drugs.    Medications:   Medications Prior to Admission   Medication Sig Dispense Refill Last Dose   • lisinopril (PRINIVIL,ZESTRIL) 20 MG tablet Take 20 mg by mouth Daily.   Past Week at Unknown time   • pantoprazole (PROTONIX) 40 MG EC tablet Take 1 tablet by mouth Daily. 30 tablet 11 Past Month at Unknown time   • aspirin  MG tablet Take 325 mg by mouth As Needed.   Unknown at Unknown time       Allergies:  Codeine    ROS:    General: Weight stable  Resp: No SOA  Cardiovascular: No CP      Objective     /83 (Patient Position: Sitting)   Pulse 54   Temp 98.1 °F (36.7 °C) (Temporal)   Resp 18   Ht 170.2 cm (67\")   Wt 90.3 kg (199 lb)   SpO2 96%   BMI 31.17 kg/m²     Physical Exam   Constitutional: Pt is oriented to person, place, and in no distress.  Eyes: No icterus  ENMT: Unremarkable   Cardiovascular: Normal rate, regular rhythm.    Pulmonary/Chest: No distress.  No audible wheezes  Abdominal: Soft.   Skin: Skin is warm and dry.   Psychiatric: Mood, memory, affect and judgment appear normal.     Assessment/Plan     Diagnosis:  Gastric ulcer  Gastric erosion  Recent H. pylori gastritis treated.    Anticipated Surgical Procedure:  Endoscopy    The risks, benefits, and alternatives of endoscopy were reviewed with the patient today.  Risks including perforation, with or without dilation, possibly requiring surgery.  Additional risks include risk of bleeding.  There is also the risk of a drug reaction or problems with anesthesia.  This will be discussed with the further by the anesthesia team on the day of the procedure. The benefits include the diagnosis and management of disease of the upper digestive tract.  Alternatives to endoscopy include upper GI series, laboratory testing, radiographic evaluation, or no intervention.  The patient verbalizes understanding and agrees.    Much of this encounter note is an electronic transcription/translation of spoken language to printed text. The electronic " translation of spoken language may permit erroneous, or at times, nonsensical words or phrases to be inadvertently transcribed; although I have reviewed the note for such errors, some may still exist.

## 2021-09-20 NOTE — ANESTHESIA PREPROCEDURE EVALUATION
Anesthesia Evaluation     Patient summary reviewed   no history of anesthetic complications:  NPO Solid Status: > 8 hours  NPO Liquid Status: > 8 hours           Airway   Mallampati: II  TM distance: >3 FB  Neck ROM: full  Dental    (+) upper dentures and lower dentures    Pulmonary - negative pulmonary ROS   (-) asthma, sleep apnea, not a smoker  Cardiovascular   Exercise tolerance: good (4-7 METS)    (+) hypertension,       Neuro/Psych- negative ROS  (-) seizures, TIA  GI/Hepatic/Renal/Endo    (+)  GERD,  liver disease (hemochromatosis, phlebotomy q2 weeks),     Musculoskeletal     Abdominal    Substance History      OB/GYN          Other                          Anesthesia Plan    ASA 2     MAC     intravenous induction     Anesthetic plan, all risks, benefits, and alternatives have been provided, discussed and informed consent has been obtained with: patient.

## 2021-09-21 LAB
CYTO UR: NORMAL
LAB AP CASE REPORT: NORMAL
PATH REPORT.FINAL DX SPEC: NORMAL
PATH REPORT.GROSS SPEC: NORMAL
UREASE TISS QL: NEGATIVE

## 2021-09-29 ENCOUNTER — LAB (OUTPATIENT)
Dept: LAB | Facility: HOSPITAL | Age: 66
End: 2021-09-29

## 2021-09-29 ENCOUNTER — TELEPHONE (OUTPATIENT)
Dept: GASTROENTEROLOGY | Facility: CLINIC | Age: 66
End: 2021-09-29

## 2021-09-29 DIAGNOSIS — E83.110 HEREDITARY HEMOCHROMATOSIS (HCC): ICD-10-CM

## 2021-09-29 PROBLEM — K31.A0 INTESTINAL METAPLASIA OF GASTRIC MUCOSA: Status: ACTIVE | Noted: 2021-09-29

## 2021-09-29 LAB
BASOPHILS # BLD AUTO: 0.05 10*3/MM3 (ref 0–0.2)
BASOPHILS NFR BLD AUTO: 0.7 % (ref 0–1.5)
DEPRECATED RDW RBC AUTO: 43.9 FL (ref 37–54)
EOSINOPHIL # BLD AUTO: 0.3 10*3/MM3 (ref 0–0.4)
EOSINOPHIL NFR BLD AUTO: 4.3 % (ref 0.3–6.2)
ERYTHROCYTE [DISTWIDTH] IN BLOOD BY AUTOMATED COUNT: 12.2 % (ref 12.3–15.4)
FERRITIN SERPL-MCNC: 87.56 NG/ML (ref 30–400)
HCT VFR BLD AUTO: 43.7 % (ref 37.5–51)
HGB BLD-MCNC: 14.4 G/DL (ref 13–17.7)
IMM GRANULOCYTES # BLD AUTO: 0.03 10*3/MM3 (ref 0–0.05)
IMM GRANULOCYTES NFR BLD AUTO: 0.4 % (ref 0–0.5)
LYMPHOCYTES # BLD AUTO: 3.09 10*3/MM3 (ref 0.7–3.1)
LYMPHOCYTES NFR BLD AUTO: 44.2 % (ref 19.6–45.3)
Lab: NORMAL
MCH RBC QN AUTO: 32.2 PG (ref 26.6–33)
MCHC RBC AUTO-ENTMCNC: 33 G/DL (ref 31.5–35.7)
MCV RBC AUTO: 97.8 FL (ref 79–97)
MONOCYTES # BLD AUTO: 0.82 10*3/MM3 (ref 0.1–0.9)
MONOCYTES NFR BLD AUTO: 11.7 % (ref 5–12)
NEUTROPHILS NFR BLD AUTO: 2.7 10*3/MM3 (ref 1.7–7)
NEUTROPHILS NFR BLD AUTO: 38.7 % (ref 42.7–76)
NRBC BLD AUTO-RTO: 0 /100 WBC (ref 0–0.2)
PLATELET # BLD AUTO: 135 10*3/MM3 (ref 140–450)
PMV BLD AUTO: 11.7 FL (ref 6–12)
POST-BLOOD PRESSURE: NORMAL
PRE-BLOOD PRESSURE: NORMAL
PRE-HCT: 43.7
PRE-HGB: 14.4
PULSE: 45
RBC # BLD AUTO: 4.47 10*6/MM3 (ref 4.14–5.8)
VOLUME COLLECTED: 500
WBC # BLD AUTO: 6.99 10*3/MM3 (ref 3.4–10.8)

## 2021-09-29 PROCEDURE — 85025 COMPLETE CBC W/AUTO DIFF WBC: CPT

## 2021-09-29 PROCEDURE — 36415 COLL VENOUS BLD VENIPUNCTURE: CPT

## 2021-09-29 PROCEDURE — 99195 PHLEBOTOMY: CPT

## 2021-09-29 PROCEDURE — 82728 ASSAY OF FERRITIN: CPT

## 2021-11-02 ENCOUNTER — OFFICE VISIT (OUTPATIENT)
Dept: URGENT CARE | Age: 66
End: 2021-11-02
Payer: COMMERCIAL

## 2021-11-02 VITALS
RESPIRATION RATE: 18 BRPM | HEART RATE: 61 BPM | WEIGHT: 205 LBS | DIASTOLIC BLOOD PRESSURE: 79 MMHG | OXYGEN SATURATION: 98 % | SYSTOLIC BLOOD PRESSURE: 128 MMHG | BODY MASS INDEX: 32.11 KG/M2 | TEMPERATURE: 97.7 F

## 2021-11-02 DIAGNOSIS — R05.9 COUGH: ICD-10-CM

## 2021-11-02 DIAGNOSIS — J34.89 PURULENT RHINORRHEA: ICD-10-CM

## 2021-11-02 DIAGNOSIS — J01.00 ACUTE MAXILLARY SINUSITIS, RECURRENCE NOT SPECIFIED: Primary | ICD-10-CM

## 2021-11-02 PROCEDURE — 99213 OFFICE O/P EST LOW 20 MIN: CPT | Performed by: NURSE PRACTITIONER

## 2021-11-02 PROCEDURE — 3017F COLORECTAL CA SCREEN DOC REV: CPT | Performed by: NURSE PRACTITIONER

## 2021-11-02 PROCEDURE — 96372 THER/PROPH/DIAG INJ SC/IM: CPT | Performed by: NURSE PRACTITIONER

## 2021-11-02 PROCEDURE — G8417 CALC BMI ABV UP PARAM F/U: HCPCS | Performed by: NURSE PRACTITIONER

## 2021-11-02 PROCEDURE — G8427 DOCREV CUR MEDS BY ELIG CLIN: HCPCS | Performed by: NURSE PRACTITIONER

## 2021-11-02 PROCEDURE — 1036F TOBACCO NON-USER: CPT | Performed by: NURSE PRACTITIONER

## 2021-11-02 PROCEDURE — G8484 FLU IMMUNIZE NO ADMIN: HCPCS | Performed by: NURSE PRACTITIONER

## 2021-11-02 RX ORDER — DEXAMETHASONE SODIUM PHOSPHATE 10 MG/ML
10 INJECTION INTRAMUSCULAR; INTRAVENOUS ONCE
Status: COMPLETED | OUTPATIENT
Start: 2021-11-02 | End: 2021-11-02

## 2021-11-02 RX ORDER — CEFDINIR 300 MG/1
300 CAPSULE ORAL 2 TIMES DAILY
Qty: 20 CAPSULE | Refills: 0 | Status: SHIPPED | OUTPATIENT
Start: 2021-11-02 | End: 2021-11-12

## 2021-11-02 RX ORDER — BENZONATATE 100 MG/1
100 CAPSULE ORAL 2 TIMES DAILY PRN
Qty: 20 CAPSULE | Refills: 0 | Status: SHIPPED | OUTPATIENT
Start: 2021-11-02 | End: 2021-11-09

## 2021-11-02 RX ADMIN — DEXAMETHASONE SODIUM PHOSPHATE 10 MG: 10 INJECTION INTRAMUSCULAR; INTRAVENOUS at 11:18

## 2021-11-02 ASSESSMENT — ENCOUNTER SYMPTOMS
COUGH: 1
SORE THROAT: 0
WHEEZING: 0
SHORTNESS OF BREATH: 1
EYES NEGATIVE: 1
RHINORRHEA: 1
CHEST TIGHTNESS: 0
SINUS PRESSURE: 1
DIARRHEA: 0
NAUSEA: 0
VOMITING: 0
CONSTIPATION: 0

## 2021-11-02 ASSESSMENT — PATIENT HEALTH QUESTIONNAIRE - PHQ9
2. FEELING DOWN, DEPRESSED OR HOPELESS: 0
SUM OF ALL RESPONSES TO PHQ QUESTIONS 1-9: 0
DEPRESSION UNABLE TO ASSESS: FUNCTIONAL CAPACITY MOTIVATION LIMITS ACCURACY
SUM OF ALL RESPONSES TO PHQ9 QUESTIONS 1 & 2: 0
1. LITTLE INTEREST OR PLEASURE IN DOING THINGS: 0
SUM OF ALL RESPONSES TO PHQ QUESTIONS 1-9: 0
SUM OF ALL RESPONSES TO PHQ QUESTIONS 1-9: 0

## 2021-11-02 NOTE — PATIENT INSTRUCTIONS
1. Steroid shot given in office. 2. Start antibiotic as prescribed. 3. Tessalon perles as needed for coughing. 4. Also recommend over the counter Flonase and Claritin or Zyrtec. 5. Return for new or worsening symptoms. Urinary retention

## 2021-11-02 NOTE — PROGRESS NOTES
DEXAMETHASONE 10 MG GIVEN INTRAMUSCULARLY IN  RIGHT GLUTEAL. PT TOLERATED WELL. A-T Advancement Flap Text: The defect edges were debeveled with a #15 scalpel blade.  Given the location of the defect, shape of the defect and the proximity to free margins an A-T advancement flap was deemed most appropriate.  Using a sterile surgical marker, an appropriate advancement flap was drawn incorporating the defect and placing the expected incisions within the relaxed skin tension lines where possible.    The area thus outlined was incised deep to adipose tissue with a #15 scalpel blade.  The skin margins were undermined to an appropriate distance in all directions utilizing iris scissors.

## 2021-11-02 NOTE — PROGRESS NOTES
[address] [facility]  [address] [facility] [address] [id number]  Dept: [phone]  Dept Fax: [phone]  Loc: [phone]    Dominick Ceron is a 61 y.o. male who presents today for his medical conditions/complaintsas noted below. Dominick Ceron is c/o of Cough and Head Congestion        HPI:     Cough  This is a new problem. The current episode started in the past 7 days. The problem has been unchanged. The cough is non-productive. Associated symptoms include nasal congestion, rhinorrhea and shortness of breath (nightly with coughing). Pertinent negatives include no chest pain, fever, sore throat or wheezing. Nothing aggravates the symptoms. Treatments tried: Vicks. The treatment provided mild relief. His past medical history is significant for environmental allergies. History of seasonal allergies and sinus infections. No known sick contacts, is vaccinated.      Past Medical History:   Diagnosis Date    Cirrhosis of liver without ascites ([facility] [address] 7/17/2021    Hypertension     Joint pain      Past Surgical History:   Procedure Laterality Date    TONSILLECTOMY         Family History   Problem Relation Age of Onset    Diabetes Mother     High Blood Pressure Mother     Heart Disease Father        Social History     Tobacco Use    Smoking status: Never Smoker    Smokeless tobacco: Never Used   Substance Use Topics    Alcohol use: Yes      Current Outpatient Medications   Medication Sig Dispense Refill    benzonatate (TESSALON) 100 MG capsule Take 1 capsule by mouth 2 times daily as needed for Cough 20 capsule 0    cefdinir (OMNICEF) 300 MG capsule Take 1 capsule by mouth 2 times daily for 10 days 20 capsule 0    diclofenac sodium (VOLTAREN) 1 % GEL Apply 2 g topically 2 times daily 200 g 5    omeprazole (PRILOSEC) 20 MG delayed release capsule Take 1 capsule by mouth daily as needed (GERD) 30 capsule 2    lisinopril (PRINIVIL;ZESTRIL) 20 MG tablet Take 1 tablet by mouth daily 90 tablet 3    albuterol sulfate  (90 Base) MCG/ACT inhaler Inhale 2 puffs into the lungs every 6 hours as needed for Wheezing 1 Inhaler 0    aspirin 325 MG EC tablet Take 325 mg by mouth daily       Current Facility-Administered Medications   Medication Dose Route Frequency Provider Last Rate Last Admin    dexamethasone (DECADRON) injection 10 mg  10 mg IntraMUSCular Once Aria Osorio, APRN - CNP         Allergies   Allergen Reactions    Codeine        Health Maintenance   Topic Date Due    Hepatitis C screen  Never done    Hepatitis A vaccine (1 of 2 - Risk 2-dose series) Never done    Pneumococcal 0-64 years Vaccine (1 of 2 - PPSV23) Never done    COVID-19 Vaccine (1) Never done    HIV screen  Never done    Hepatitis B vaccine (1 of 3 - Risk 3-dose series) Never done    Diabetes screen  Never done    Flu vaccine (1) 09/01/2021    Potassium monitoring  04/05/2022    Creatinine monitoring  04/05/2022    Colon cancer screen colonoscopy  02/24/2023    Lipid screen  12/16/2025    DTaP/Tdap/Td vaccine (2 - Td or Tdap) 04/12/2028    Shingles Vaccine  Completed    Hib vaccine  Aged Out    Meningococcal (ACWY) vaccine  Aged Out       Subjective:     Review of Systems   Constitutional: Negative for fever. HENT: Positive for congestion, rhinorrhea and sinus pressure. Negative for sore throat. Eyes: Negative. Respiratory: Positive for cough and shortness of breath (nightly with coughing). Negative for chest tightness and wheezing. Cardiovascular: Negative for chest pain and palpitations. Gastrointestinal: Negative for constipation, diarrhea, nausea and vomiting. Endocrine: Negative. Genitourinary: Negative. Musculoskeletal: Negative. Skin: Negative. Allergic/Immunologic: Positive for environmental allergies. Neurological: Negative for dizziness, speech difficulty, weakness and numbness. Psychiatric/Behavioral: Negative for confusion.    All other systems reviewed and are negative. Objective:     Physical Exam  Vitals and nursing note reviewed. Constitutional:       General: He is not in acute distress. Appearance: Normal appearance. He is not ill-appearing or toxic-appearing. HENT:      Head: Normocephalic and atraumatic. Right Ear: Tympanic membrane, ear canal and external ear normal.      Left Ear: Tympanic membrane, ear canal and external ear normal.      Nose: Congestion present. Right Sinus: No maxillary sinus tenderness or frontal sinus tenderness. Left Sinus: No maxillary sinus tenderness or frontal sinus tenderness. Mouth/Throat:      Mouth: Mucous membranes are moist.      Pharynx: Posterior oropharyngeal erythema present. Comments: PND noted  Eyes:      Extraocular Movements: Extraocular movements intact. Conjunctiva/sclera: Conjunctivae normal.      Pupils: Pupils are equal, round, and reactive to light. Cardiovascular:      Rate and Rhythm: Normal rate and regular rhythm. Heart sounds: Normal heart sounds. Pulmonary:      Effort: Pulmonary effort is normal.      Breath sounds: Normal breath sounds. Musculoskeletal:         General: No swelling or signs of injury. Lymphadenopathy:      Cervical: No cervical adenopathy. Skin:     General: Skin is warm and dry. Findings: No rash. Neurological:      General: No focal deficit present. Mental Status: He is alert and oriented to person, place, and time. Motor: No weakness. Psychiatric:         Mood and Affect: Mood normal.       /79   Pulse 61   Temp 97.7 °F (36.5 °C) (Temporal)   Resp 18   Wt 205 lb (93 kg)   SpO2 98%   BMI 32.11 kg/m²     Assessment:       Diagnosis Orders   1. Acute maxillary sinusitis, recurrence not specified  cefdinir (OMNICEF) 300 MG capsule    dexamethasone (DECADRON) injection 10 mg   2. Purulent rhinorrhea  cefdinir (OMNICEF) 300 MG capsule   3.  Cough  benzonatate (TESSALON) 100 MG capsule Plan:    No orders of the defined types were placed in this encounter. Return if symptoms worsen or fail to improve. Orders Placed This Encounter   Medications    benzonatate (TESSALON) 100 MG capsule     Sig: Take 1 capsule by mouth 2 times daily as needed for Cough     Dispense:  20 capsule     Refill:  0    cefdinir (OMNICEF) 300 MG capsule     Sig: Take 1 capsule by mouth 2 times daily for 10 days     Dispense:  20 capsule     Refill:  0    dexamethasone (DECADRON) injection 10 mg       Patient given educational materials- see patient instructions. Discussed use, benefit, and side effects of prescribedmedications. All patient questions answered. Pt voiced understanding. Reviewedhealth maintenance. Instructed to continue current medications, diet and exercise. Patient agreed with treatment plan. Follow up as directed. Patient Instructions   1. Steroid shot given in office. 2. Start antibiotic as prescribed. 3. Tessalon perles as needed for coughing. 4. Also recommend over the counter Flonase and Claritin or Zyrtec. 5. Return for new or worsening symptoms.            Electronically signed by TODD Christopher CNP on 11/2/2021 at 11:17 AM

## 2021-12-15 NOTE — PROGRESS NOTES
Primary Physician: Ermias Monteiro MD    Chief Complaint   Patient presents with   • Follow-up     Pt presents today for hereditary hemochromatosis follow up-pt states he is feeling good         Subjective     Frankie Reid is a 66 y.o. male.    HPI   Gastric intestinal metaplasia--new diagnosis to review today   Endoscopy was done in September 2021 to follow-up on gastric ulcer previously been seen a few months prior.  Biopsies from the antrum showed gastric intestinal metaplasia.  H. pylori biopsy was negative.  He had previously had H pylori in July which was treated and then found to be cured.    Genetic hemochromatosis  Patient had [facility] at [facility] 4/5/2021 for other reasons which showed nodular changes of the liver concerning for cirrhosis.  LFTs have been normal.  Laboratory panel through this office shows high ferritin in the 700 range.  He is homozygous for H63D mutation.  No family history of liver disease.   He was drinking 1-2 cases of beer a week or 1/5th whiskey a week.  He stopped alcohol on April 4, 2021.  He has only had a beer or 2 with a steak since then.     Sonogram of the liver with elastography 6/2021 shows F3/F4 status.  No masses noted.  aFP is normal.    Endoscopy 2021 does not show varices.    Ferritin in correct range starting 9/2021 after a series of phlebotomy.  Now on every 3 month cycle in 2021.  Last phlebotomy done 9/29/2021.  He is scheduled for his next session in a week.     Adenomatous colon polyps  Adenomas were removed in 2012 and 2017.  Last colonoscopy done 2/2020 and unremarkable.  He will need a repeat colonoscopy in 2/2025.  His sister also has had colon polyps less than 60 years old.      Past Medical History:   Diagnosis Date   • Arthritis    • Diverticulosis    • ED (erectile dysfunction)    • Family history of colonic polyps    • GERD (gastroesophageal reflux disease)    • History of adenomatous polyp of colon    • History of colon polyps    •  Hypertension        Past Surgical History:   Procedure Laterality Date   • COLONOSCOPY N/A 2/10/2017    One 7mm sessile serated adenomatous polyp in the ascending colon; Two 5-7mm adenomatous polyps in the transverse colon; One 4mm hyperplastic polyp in the rectum; Repeat 3 years   • COLONOSCOPY  10/25/2012    One 6mm adenomatous polyp in the sigmoid colon; Repeat 4 years   • COLONOSCOPY N/A 2/24/2020    Diverticulosis in the left colon; The examination was otherwise normal on direct and retroflexion views; No specimens collected; Repeat 5 years   • ENDOSCOPY N/A 7/19/2021    Non-bleeding gastric ulcer with no stigmata of bleeding-biopsied; Non-bleeding erosive gastropathy; Normal examined duodenum; Repeat 2 months   • ENDOSCOPY N/A 9/20/2021    Small HH; Erythematous mucosa in the antrum-biopsied; Normal examined duodenum   • MULTIPLE TOOTH EXTRACTIONS     • TONSILLECTOMY          Current Outpatient Medications:   •  lisinopril (PRINIVIL,ZESTRIL) 20 MG tablet, Take 20 mg by mouth Daily., Disp: , Rfl:   •  pantoprazole (PROTONIX) 40 MG EC tablet, Take 1 tablet by mouth Daily., Disp: 30 tablet, Rfl: 11    Allergies   Allergen Reactions   • Codeine Nausea And Vomiting       Social History     Socioeconomic History   • Marital status: Single   Tobacco Use   • Smoking status: Never Smoker   • Smokeless tobacco: Never Used   Vaping Use   • Vaping Use: Never used   Substance and Sexual Activity   • Alcohol use: Not Currently   • Drug use: No   • Sexual activity: Defer       Family History   Problem Relation Age of Onset   • Colon polyps Sister         < 60 years of age   • No Known Problems Mother    • Heart disease Father    • Colon cancer Neg Hx    • Esophageal cancer Neg Hx    • Liver cancer Neg Hx    • Liver disease Neg Hx    • Rectal cancer Neg Hx    • Stomach cancer Neg Hx        Review of Systems   Respiratory: Negative for shortness of breath.    Cardiovascular: Negative for chest pain.       Objective     BP  "110/74 (BP Location: Left arm, Patient Position: Sitting, Cuff Size: Adult)   Pulse 72   Temp 97.4 °F (36.3 °C) (Infrared)   Ht 170.2 cm (67\")   Wt 90.7 kg (200 lb)   SpO2 97%   BMI 31.32 kg/m²     Physical Exam  Constitutional:       Appearance: He is well-developed.   Pulmonary:      Effort: Pulmonary effort is normal.   Musculoskeletal:         General: Normal range of motion.   Skin:     General: Skin is warm.   Neurological:      Mental Status: He is alert and oriented to person, place, and time.   Psychiatric:         Behavior: Behavior normal.         Lab Results - Last 18 Months   Lab Units 05/11/21  0952 12/16/20  0918   GLUCOSE mg/dL 92 96   BUN mg/dL 15 15   CREATININE mg/dL 1.13 1.1   SODIUM mmol/L 139 141   POTASSIUM mmol/L 4.0 3.7   CHLORIDE mmol/L 104 103   TOTAL CO2 mmol/L  --  28   CO2 mmol/L 25.0  --    TOTAL PROTEIN g/dL 7.3 7.2   ALBUMIN g/dL 4.30 3.9   ALT (SGPT) U/L 29 25   AST (SGOT) U/L 21 20   ALK PHOS U/L 73 77   BILIRUBIN mg/dL 0.9 0.8   GLOBULIN gm/dL 3.0  --    ALPHA-FETOPROTEIN ng/mL 3.61  --        Lab Results - Last 18 Months   Lab Units 09/29/21  0808 09/01/21  0803 08/18/21  0804 08/04/21  0804 07/21/21  0834 07/07/21  0823 04/05/21  1500   HEMOGLOBIN g/dL 14.4 14.6 14.6 14.7 15.0 16.3 15.9   HEMATOCRIT % 43.7 42.4 41.9 42.6 42.1 46.3 46.7   MCV fL 97.8* 96.1 95.7 96.6 94.8 95.9 97.1*   WBC 10*3/mm3 6.99 7.51 7.41 6.84 7.11 9.11 8.2   RDW % 12.2* 12.4 12.5 12.6 12.7 12.6 12.2   MPV fL 11.7 12.0 11.9 12.1* 12.3* 12.1* 11.9   PLATELETS 10*3/mm3 135* 127* 143 137* 133* 124* 125*   INR   --   --   --   --   --   --  1.03       Lab Results - Last 18 Months   Lab Units 09/29/21  0808 09/01/21  0803 08/18/21  0804 08/04/21  0804 07/21/21  0834 07/07/21  0823 05/11/21 0952 05/11/21 0952   IRON mcg/dL  --   --   --   --   --   --   --  166*   TIBC mcg/dL  --   --   --   --   --   --   --  294*   IRON SATURATION %  --   --   --   --   --   --   --  57*   FERRITIN ng/mL 87.56 233.00 " "308.40 433.90* 618.50* 724.50*   < > 737.00*    < > = values in this interval not displayed.        Lab Results - Last 18 Months   Lab Units 09/29/21  0808 07/07/21  0823 06/01/21  1045 05/11/21  0952   HEP B C IGM   --   --   --  Non-Reactive   HEP B S AG   --   --   --  Non-Reactive   ALPHA-FETOPROTEIN ng/mL  --   --   --  3.61   FERRITIN ng/mL 87.56   < >  --  737.00*   CERULOPLASMIN mg/dL  --   --   --  19   MITOCHONDRIAL AB Units  --   --   --  <20.0   A1 ANTITRYPSIN mg/dL  --   --   --  139   HEMOCHROMATOSIS GENE   --   --  Comment  --     < > = values in this interval not displayed.         Case Report   Surgical Pathology Report                         Case: [id number]                                   Authorizing Provider:  Dorothea Sales MD         Collected:           09/20/2021 09:33 AM           Ordering Location:     [facility]     Received:            09/20/2021 12:51 PM                                  ENDOSCOPY                                                                     Pathologist:           Lurdes Gonzalez MD                                                         Specimen:    Stomach, antral bx stomach                                                                 Final Diagnosis   Gastric antrum, endoscopic biopsy:  A.  Chronic active gastritis, severe, with intestinal metaplasia.  B.  No glandular dysplasia identified.   Electronically signed by Lurdes Gonzalez MD on 9/21/2021 at 9167   Gross Description    Specimen #1 is received in a formalin filled container labeled with the patient's name, date of birth, and \"antral biopsies stomach\".  The specimen consists of 2 yellow to gray soft tissue fragments aggregating to 0.4 x 0.3 x 0.1 cm, totally submitted in block 1A.   Microscopic Description    Sections of the gastric antrum biopsy reveal fragments of gastric mucosa demonstrating severe chronic active inflammation.  Granulomata are not seen.  Intestinal metaplasia " with goblet cell change is present.  Dysplasia is not seen.  There is no evidence of malignancy.           IMPRESSION/PLAN:    Assessment/Plan      Problem List Items Addressed This Visit        Endocrine and Metabolic    Hereditary hemochromatosis (HCC)    Overview     Homozygous for H63D.  Ferritin 700 range.  I have advised phlebotomy program.  I reviewed this with him in detail.  I also reviewed that hereditary hemochromatosis is an independent risk factor for hepatocellular carcinoma.  He will need laboratories and serial sonograms of liver done on a regular basis.    Ferritin in correct range starting 9/2021 after a series of phlebotomy.  Now on every 3 month cycle in 2021.    No varices on endoscopy 2021.            Gastrointestinal Abdominal     History of adenomatous polyp of colon    Overview     Adenomatous colon polyps were removed in 2012 and 2017.  Last colonoscopy 2/2020 unremarkable.  He will be due for repeat colonoscopy in 2/2025.         Abnormal finding on imaging of liver    Overview     CTA of the chest shows nodular liver suggestive of cirrhosis.  Subsequent sonogram of the liver performed at [facility] shows coarse hepatic texture.  No focal mass.  LFTs are normal.  Full serological evaluation unremarkable with exception of a ferritin in the 700 range with increased iron saturation. Homozygous for H63D mutation..  Fib 4 score is 2 which is nondiagnostic. Elastography = F3.    He has not been vaccinated for Hep A or B.  I have advised the Twinrix vaccine.  He will check with Dr. Monteiro when he sees him in Jan 2022         Acute gastric erosion    Overview     Multiple gastric erosions seen on endoscopy 7/2021.  Urease positive and subsequently treated for H. pylori.  Follow-up biopsies negative..         Helicobacter pylori gastritis    Overview     Found 7/2021    Biaxin twice a day for 14 days  Amoxicillin 2 tablets twice a day for 14 days  Protonix twice a day for 14 days  Pepto-Bismol 2 tablets  4 times a day for 14 days    H. pylori cured.  Biopsies negative on follow-up.         Intestinal metaplasia of gastric mucosa - Primary    Overview     Found on endoscopy 2021.  Will plan repeat endoscopy in February 2022 to do biopsies for mapping.  He will have vacation time available at that point to proceed.         Current Assessment & Plan     The risks, benefits, and alternatives of endoscopy were reviewed with the patient today.  Risks including perforation, with or without dilation, possibly requiring surgery.  Additional risks include risk of bleeding.  There is also the risk of a drug reaction or problems with anesthesia.  This will be discussed with the further by the anesthesia team on the day of the procedure. The benefits include the diagnosis and management of disease of the upper digestive tract.  Alternatives to endoscopy include upper GI series, laboratory testing, radiographic evaluation, or no intervention.  The patient verbalizes understanding and agrees.    In accordance with requirements under the Affordable Care Act, [facility] has provided pricing for all hospital services and items on each of its websites. However, a patient's actual cost may differ based on the services the patient receives to meet individual healthcare needs and based on the benefits provided under the patient’s insurance coverage.           Relevant Orders    Case Request (Completed)          MEDICAL COMPLEXITY must have 2 out of 3   Moderate Complexity Level 4                                                                                                              1 of the following medical problems:                                                                                               []One chronic illness with mild exacerbation                                                                                [x]Two or more stable chronic illness                                                                                               [x]One new problem  []One acute illness with systemic symptoms     Complexity of Data  Reviewed (1 out of the 3 following categories)                                             Category 1 tests, documents, historian (must have 3 points)                                                      []Review of prior external records  [x]Review of results of unique tests  []Ordering unique tests   []Assessment requires an independent historian   Category 2 Interpretation of tests     []Independent interpretation of test read by another doc   Category 3 Discuss Management/tests  []Discussion with external physician     Risk of complications and/or morbidity                                                                                           [x]Prescription Drug Management     [x]Decision for elective endoscopic procedure              RTC 6 months       Dorothea Sales MD  12/22/21  14:14 CST    Much of this encounter note is an electronic transcription/translation of spoken language to printed text. The electronic translation of spoken language may permit erroneous, or at times, nonsensical words or phrases to be inadvertently transcribed; although I have reviewed the note for such errors, some may still exist.

## 2021-12-22 ENCOUNTER — OFFICE VISIT (OUTPATIENT)
Dept: GASTROENTEROLOGY | Facility: CLINIC | Age: 66
End: 2021-12-22

## 2021-12-22 VITALS
DIASTOLIC BLOOD PRESSURE: 74 MMHG | WEIGHT: 200 LBS | HEART RATE: 72 BPM | OXYGEN SATURATION: 97 % | TEMPERATURE: 97.4 F | HEIGHT: 67 IN | BODY MASS INDEX: 31.39 KG/M2 | SYSTOLIC BLOOD PRESSURE: 110 MMHG

## 2021-12-22 DIAGNOSIS — Z86.010 HISTORY OF ADENOMATOUS POLYP OF COLON: ICD-10-CM

## 2021-12-22 DIAGNOSIS — K31.A0 INTESTINAL METAPLASIA OF GASTRIC MUCOSA: Primary | ICD-10-CM

## 2021-12-22 DIAGNOSIS — K29.70 HELICOBACTER PYLORI GASTRITIS: ICD-10-CM

## 2021-12-22 DIAGNOSIS — R93.2 ABNORMAL FINDING ON IMAGING OF LIVER: ICD-10-CM

## 2021-12-22 DIAGNOSIS — E83.110 HEREDITARY HEMOCHROMATOSIS (HCC): ICD-10-CM

## 2021-12-22 DIAGNOSIS — B96.81 HELICOBACTER PYLORI GASTRITIS: ICD-10-CM

## 2021-12-22 DIAGNOSIS — K25.3 ACUTE GASTRIC EROSION: ICD-10-CM

## 2021-12-22 PROCEDURE — 99214 OFFICE O/P EST MOD 30 MIN: CPT | Performed by: INTERNAL MEDICINE

## 2021-12-22 NOTE — ASSESSMENT & PLAN NOTE
The risks, benefits, and alternatives of endoscopy were reviewed with the patient today.  Risks including perforation, with or without dilation, possibly requiring surgery.  Additional risks include risk of bleeding.  There is also the risk of a drug reaction or problems with anesthesia.  This will be discussed with the further by the anesthesia team on the day of the procedure. The benefits include the diagnosis and management of disease of the upper digestive tract.  Alternatives to endoscopy include upper GI series, laboratory testing, radiographic evaluation, or no intervention.  The patient verbalizes understanding and agrees.    In accordance with requirements under the Affordable Care Act, [facility] has provided pricing for all hospital services and items on each of its websites. However, a patient's actual cost may differ based on the services the patient receives to meet individual healthcare needs and based on the benefits provided under the patient’s insurance coverage.

## 2022-01-03 ENCOUNTER — LAB (OUTPATIENT)
Dept: LAB | Facility: HOSPITAL | Age: 67
End: 2022-01-03

## 2022-01-03 ENCOUNTER — TRANSCRIBE ORDERS (OUTPATIENT)
Dept: ADMINISTRATIVE | Facility: HOSPITAL | Age: 67
End: 2022-01-03

## 2022-01-03 DIAGNOSIS — E83.110 HEREDITARY HEMOCHROMATOSIS: ICD-10-CM

## 2022-01-03 DIAGNOSIS — E83.110 HEREDITARY HEMOCHROMATOSIS: Primary | ICD-10-CM

## 2022-01-03 LAB
DEPRECATED RDW RBC AUTO: 47.5 FL (ref 37–54)
ERYTHROCYTE [DISTWIDTH] IN BLOOD BY AUTOMATED COUNT: 14.2 % (ref 12.3–15.4)
FERRITIN SERPL-MCNC: 57.29 NG/ML (ref 30–400)
HCT VFR BLD AUTO: 48.3 % (ref 37.5–51)
HGB BLD-MCNC: 15.9 G/DL (ref 13–17.7)
Lab: NORMAL
MCH RBC QN AUTO: 30.1 PG (ref 26.6–33)
MCHC RBC AUTO-ENTMCNC: 32.9 G/DL (ref 31.5–35.7)
MCV RBC AUTO: 91.5 FL (ref 79–97)
PLATELET # BLD AUTO: 131 10*3/MM3 (ref 140–450)
PMV BLD AUTO: 11.8 FL (ref 6–12)
POST-BLOOD PRESSURE: NORMAL MMHG
PRE-BLOOD PRESSURE: NORMAL MMHG
PRE-HCT: 48.3 %
PRE-HGB: 15.9 G/DL
PULSE: 56 BPM
RBC # BLD AUTO: 5.28 10*6/MM3 (ref 4.14–5.8)
VOLUME COLLECTED: 500 ML
WBC NRBC COR # BLD: 7.45 10*3/MM3 (ref 3.4–10.8)

## 2022-01-03 PROCEDURE — 82728 ASSAY OF FERRITIN: CPT

## 2022-01-03 PROCEDURE — 99195 PHLEBOTOMY: CPT

## 2022-01-03 PROCEDURE — 85027 COMPLETE CBC AUTOMATED: CPT

## 2022-01-03 PROCEDURE — 36415 COLL VENOUS BLD VENIPUNCTURE: CPT

## 2022-01-06 ENCOUNTER — TELEPHONE (OUTPATIENT)
Dept: GASTROENTEROLOGY | Facility: CLINIC | Age: 67
End: 2022-01-06

## 2022-01-06 NOTE — TELEPHONE ENCOUNTER
Pt returned my call and I spoke to him re: results-he JACOB. I will fax new form to Aleta in the lab and he is aware she will reach out to him to r/s his phlebotomy out 1 more month. Pt advised to call me back with any further questions/problems.

## 2022-01-06 NOTE — TELEPHONE ENCOUNTER
Please let him know that his laboratories look excellent.  I would like to change his phlebotomy schedule from every 3 months to an every 4-month cycle.  As such, he would be due to repeat at the end of March 2022.    Dorothea Sales MD

## 2022-01-06 NOTE — TELEPHONE ENCOUNTER
Tried to call pt re: results-was unable to reach him and VM full. I will try pt again later if he doesn't call me back.

## 2022-01-07 DIAGNOSIS — Z12.5 ENCOUNTER FOR PROSTATE CANCER SCREENING: ICD-10-CM

## 2022-01-07 DIAGNOSIS — Z13.220 LIPID SCREENING: ICD-10-CM

## 2022-01-07 DIAGNOSIS — R53.83 OTHER FATIGUE: ICD-10-CM

## 2022-01-07 DIAGNOSIS — E78.00 HYPERCHOLESTEROLEMIA: ICD-10-CM

## 2022-01-07 DIAGNOSIS — I10 ESSENTIAL HYPERTENSION: ICD-10-CM

## 2022-01-07 LAB
ALBUMIN SERPL-MCNC: 4.3 G/DL (ref 3.5–5.2)
ALP BLD-CCNC: 97 U/L (ref 40–130)
ALT SERPL-CCNC: 35 U/L (ref 5–41)
ANION GAP SERPL CALCULATED.3IONS-SCNC: 13 MMOL/L (ref 7–19)
AST SERPL-CCNC: 31 U/L (ref 5–40)
BASOPHILS ABSOLUTE: 0 K/UL (ref 0–0.2)
BASOPHILS RELATIVE PERCENT: 0.5 % (ref 0–1)
BILIRUB SERPL-MCNC: 0.8 MG/DL (ref 0.2–1.2)
BUN BLDV-MCNC: 18 MG/DL (ref 8–23)
CALCIUM SERPL-MCNC: 9.3 MG/DL (ref 8.8–10.2)
CHLORIDE BLD-SCNC: 102 MMOL/L (ref 98–111)
CHOLESTEROL, TOTAL: 214 MG/DL (ref 160–199)
CO2: 27 MMOL/L (ref 22–29)
CREAT SERPL-MCNC: 1.3 MG/DL (ref 0.5–1.2)
EOSINOPHILS ABSOLUTE: 0.2 K/UL (ref 0–0.6)
EOSINOPHILS RELATIVE PERCENT: 2.9 % (ref 0–5)
GFR AFRICAN AMERICAN: >59
GFR NON-AFRICAN AMERICAN: 55
GLUCOSE BLD-MCNC: 95 MG/DL (ref 74–109)
HCT VFR BLD CALC: 46.3 % (ref 42–52)
HDLC SERPL-MCNC: 47 MG/DL (ref 55–121)
HEMOGLOBIN: 14.7 G/DL (ref 14–18)
IMMATURE GRANULOCYTES #: 0.1 K/UL
LDL CHOLESTEROL CALCULATED: 124 MG/DL
LYMPHOCYTES ABSOLUTE: 3.7 K/UL (ref 1.1–4.5)
LYMPHOCYTES RELATIVE PERCENT: 47.1 % (ref 20–40)
MCH RBC QN AUTO: 30.1 PG (ref 27–31)
MCHC RBC AUTO-ENTMCNC: 31.7 G/DL (ref 33–37)
MCV RBC AUTO: 94.7 FL (ref 80–94)
MONOCYTES ABSOLUTE: 0.7 K/UL (ref 0–0.9)
MONOCYTES RELATIVE PERCENT: 8.6 % (ref 0–10)
NEUTROPHILS ABSOLUTE: 3.2 K/UL (ref 1.5–7.5)
NEUTROPHILS RELATIVE PERCENT: 40.3 % (ref 50–65)
PDW BLD-RTO: 14.2 % (ref 11.5–14.5)
PLATELET # BLD: 155 K/UL (ref 130–400)
PMV BLD AUTO: 12.2 FL (ref 9.4–12.4)
POTASSIUM SERPL-SCNC: 4.1 MMOL/L (ref 3.5–5)
PROSTATE SPECIFIC ANTIGEN: 1.17 NG/ML (ref 0–4)
RBC # BLD: 4.89 M/UL (ref 4.7–6.1)
SODIUM BLD-SCNC: 142 MMOL/L (ref 136–145)
T4 FREE: 1.02 NG/DL (ref 0.93–1.7)
TOTAL PROTEIN: 7.5 G/DL (ref 6.6–8.7)
TRIGL SERPL-MCNC: 213 MG/DL (ref 0–149)
TSH SERPL DL<=0.05 MIU/L-ACNC: 1.08 UIU/ML (ref 0.27–4.2)
WBC # BLD: 7.9 K/UL (ref 4.8–10.8)

## 2022-01-10 NOTE — TELEPHONE ENCOUNTER
Left  hector River in lab about change in pt's phlebotomy. I will have Dr. Sales sign new form and fax that down this afternoon.

## 2022-01-13 ENCOUNTER — OFFICE VISIT (OUTPATIENT)
Dept: FAMILY MEDICINE CLINIC | Age: 67
End: 2022-01-13
Payer: COMMERCIAL

## 2022-01-13 VITALS
HEART RATE: 71 BPM | BODY MASS INDEX: 32.73 KG/M2 | OXYGEN SATURATION: 96 % | TEMPERATURE: 97.5 F | SYSTOLIC BLOOD PRESSURE: 118 MMHG | DIASTOLIC BLOOD PRESSURE: 72 MMHG | WEIGHT: 209 LBS

## 2022-01-13 DIAGNOSIS — E78.00 HYPERCHOLESTEROLEMIA: ICD-10-CM

## 2022-01-13 DIAGNOSIS — E83.110 HEREDITARY HEMOCHROMATOSIS (HCC): ICD-10-CM

## 2022-01-13 DIAGNOSIS — K21.9 GASTROESOPHAGEAL REFLUX DISEASE WITHOUT ESOPHAGITIS: ICD-10-CM

## 2022-01-13 DIAGNOSIS — I10 ESSENTIAL HYPERTENSION: Primary | ICD-10-CM

## 2022-01-13 PROCEDURE — G8482 FLU IMMUNIZE ORDER/ADMIN: HCPCS | Performed by: FAMILY MEDICINE

## 2022-01-13 PROCEDURE — 1036F TOBACCO NON-USER: CPT | Performed by: FAMILY MEDICINE

## 2022-01-13 PROCEDURE — 99214 OFFICE O/P EST MOD 30 MIN: CPT | Performed by: FAMILY MEDICINE

## 2022-01-13 PROCEDURE — 3017F COLORECTAL CA SCREEN DOC REV: CPT | Performed by: FAMILY MEDICINE

## 2022-01-13 PROCEDURE — G8427 DOCREV CUR MEDS BY ELIG CLIN: HCPCS | Performed by: FAMILY MEDICINE

## 2022-01-13 PROCEDURE — 90471 IMMUNIZATION ADMIN: CPT | Performed by: FAMILY MEDICINE

## 2022-01-13 PROCEDURE — G8417 CALC BMI ABV UP PARAM F/U: HCPCS | Performed by: FAMILY MEDICINE

## 2022-01-13 PROCEDURE — 90674 CCIIV4 VAC NO PRSV 0.5 ML IM: CPT | Performed by: FAMILY MEDICINE

## 2022-01-13 PROCEDURE — 90632 HEPA VACCINE ADULT IM: CPT | Performed by: FAMILY MEDICINE

## 2022-01-13 PROCEDURE — 90746 HEPB VACCINE 3 DOSE ADULT IM: CPT | Performed by: FAMILY MEDICINE

## 2022-01-13 PROCEDURE — 90472 IMMUNIZATION ADMIN EACH ADD: CPT | Performed by: FAMILY MEDICINE

## 2022-01-13 NOTE — PROGRESS NOTES
[facility] PHYSICIAN SERVICES  [facility] MEDICINE  [address] [address] [address]  Dept: [phone]  Dept Fax: [phone]  Loc: [phone]    Donell Marsh is a 61 y.o. male who presents today for his medical conditions/complaints as noted below. Donell Marsh is here for 6 Month Follow-Up and Other (hepatitis vaccine? )        HPI:   CC: Here today to discuss the following:    He continues to be followed by gastroenterology for history of hereditary hemochromatosis and evidence of cirrhosis. His last appointment with GI was on December 22. He has undergone phlebotomy. Now he is on a every 3-month cycle. There was no varices evident on endoscopy in 2021. He is up-to-date on his colonoscopies. Repeat in 2025. Hypertension  Compliant with medications. No adverse effects from medication. No lightheadedness, palpitations, or chest pain. Gastroesophageal Reflux Disease  Symptoms currently under control. Medication adequately controls his symptoms. No hematochezia or melena.          HPI    Past Medical History:   Diagnosis Date    Cirrhosis of liver without ascites ([facility] [address] 7/17/2021    Hypertension     Joint pain       Past Surgical History:   Procedure Laterality Date    TONSILLECTOMY         Family History   Problem Relation Age of Onset    Diabetes Mother     High Blood Pressure Mother     Heart Disease Father        Social History     Tobacco Use    Smoking status: Never Smoker    Smokeless tobacco: Never Used   Substance Use Topics    Alcohol use: Yes     Current Outpatient Medications   Medication Sig Dispense Refill    diclofenac sodium (VOLTAREN) 1 % GEL Apply 2 g topically 2 times daily 200 g 5    omeprazole (PRILOSEC) 20 MG delayed release capsule Take 1 capsule by mouth daily as needed (GERD) 30 capsule 2    lisinopril (PRINIVIL;ZESTRIL) 20 MG tablet Take 1 tablet by mouth daily 90 tablet 3    albuterol sulfate  (90 Base) MCG/ACT inhaler Inhale 2 puffs into the lungs every 6 hours as needed for Wheezing 1 Inhaler 0    aspirin 325 MG EC tablet Take 325 mg by mouth daily (Patient not taking: Reported on 1/13/2022)       No current facility-administered medications for this visit. Allergies   Allergen Reactions    Codeine        Health Maintenance   Topic Date Due    Hepatitis C screen  Never done    Pneumococcal 0-64 years Vaccine (1 of 2 - PPSV23) Never done    HIV screen  Never done    Hepatitis B vaccine (2 of 3 - Risk 3-dose series) 02/10/2022    COVID-19 Vaccine (3 - Booster) 02/28/2022    Hepatitis A vaccine (2 of 2 - Risk 2-dose series) 07/13/2022    Depression Screen  11/02/2022    Potassium monitoring  01/07/2023    Creatinine monitoring  01/07/2023    Colon cancer screen colonoscopy  02/24/2023    Lipid screen  01/07/2027    DTaP/Tdap/Td vaccine (2 - Td or Tdap) 04/12/2028    Flu vaccine  Completed    Shingles Vaccine  Completed    Hib vaccine  Aged Out    Meningococcal (ACWY) vaccine  Aged Out       Subjective:      Review of Systems  Review of Systems   Constitutional: Negative for chills and fever. HENT: Negative for congestion. Respiratory: Negative for cough, chest tightness and shortness of breath. Cardiovascular: Negative for chest pain, palpitations and leg swelling. Gastrointestinal: Negative for abdominal pain, anal bleeding, constipation, diarrhea and nausea. Genitourinary: Negative for difficulty urinating. Psychiatric/Behavioral: Negative. SeeHPI for visit specific review of symptoms. All others negative      Objective:   /72   Pulse 71   Temp 97.5 °F (36.4 °C)   Wt 209 lb (94.8 kg)   SpO2 96%   BMI 32.73 kg/m²   Physical Exam    Physical Exam   Constitutional: He appears well-developed. Does not appear ill. Neck: Normal range of motion. Neck supple. No masses. Neck Symmetric. Normal tracheal position.   No thyroid enlargement  Cardiovascular: Normal rate and regular rhythm. Exam reveals no friction rub. Carotid arteries: no bruit observed. No murmur heard. Respiratory:  Effort normal and breath sounds normal. No respiratory distress. No wheezes. No rales. No use of accessory muscles or intercostal retractions. Neurological: alert. Psychiatric: normal mood and affect. His behavior is normal. Normal judgement and insight observed.     Recent Results (from the past 672 hour(s))   TSH without Reflex    Collection Time: 01/07/22 10:40 AM   Result Value Ref Range    TSH 1.080 0.270 - 4.200 uIU/mL   T4, Free    Collection Time: 01/07/22 10:40 AM   Result Value Ref Range    T4 Free 1.02 0.93 - 1.70 ng/dL   CBC Auto Differential    Collection Time: 01/07/22 10:40 AM   Result Value Ref Range    WBC 7.9 4.8 - 10.8 K/uL    RBC 4.89 4.70 - 6.10 M/uL    Hemoglobin 14.7 14.0 - 18.0 g/dL    Hematocrit 46.3 42.0 - 52.0 %    MCV 94.7 (H) 80.0 - 94.0 fL    MCH 30.1 27.0 - 31.0 pg    MCHC 31.7 (L) 33.0 - 37.0 g/dL    RDW 14.2 11.5 - 14.5 %    Platelets 038 191 - 813 K/uL    MPV 12.2 9.4 - 12.4 fL    Neutrophils % 40.3 (L) 50.0 - 65.0 %    Lymphocytes % 47.1 (H) 20.0 - 40.0 %    Monocytes % 8.6 0.0 - 10.0 %    Eosinophils % 2.9 0.0 - 5.0 %    Basophils % 0.5 0.0 - 1.0 %    Neutrophils Absolute 3.2 1.5 - 7.5 K/uL    Immature Granulocytes # 0.1 K/uL    Lymphocytes Absolute 3.7 1.1 - 4.5 K/uL    Monocytes Absolute 0.70 0.00 - 0.90 K/uL    Eosinophils Absolute 0.20 0.00 - 0.60 K/uL    Basophils Absolute 0.00 0.00 - 0.20 K/uL   Lipid Panel    Collection Time: 01/07/22 10:40 AM   Result Value Ref Range    Cholesterol, Total 214 (H) 160 - 199 mg/dL    Triglycerides 213 (H) 0 - 149 mg/dL    HDL 47 (L) 55 - 121 mg/dL    LDL Calculated 124 <100 mg/dL   PSA screening    Collection Time: 01/07/22 10:40 AM   Result Value Ref Range    PSA 1.17 0.00 - 4.00 ng/mL   Comprehensive Metabolic Panel    Collection Time: 01/07/22 10:40 AM   Result Value Ref Range    Sodium 142 136 - 145 mmol/L    Potassium 4.1 3.5 - 5.0 mmol/L    Chloride 102 98 - 111 mmol/L    CO2 27 22 - 29 mmol/L    Anion Gap 13 7 - 19 mmol/L    Glucose 95 74 - 109 mg/dL    BUN 18 8 - 23 mg/dL    CREATININE 1.3 (H) 0.5 - 1.2 mg/dL    GFR Non-African American 55 (A) >60    GFR African American >59 >59    Calcium 9.3 8.8 - 10.2 mg/dL    Total Protein 7.5 6.6 - 8.7 g/dL    Albumin 4.3 3.5 - 5.2 g/dL    Total Bilirubin 0.8 0.2 - 1.2 mg/dL    Alkaline Phosphatase 97 40 - 130 U/L    ALT 35 5 - 41 U/L    AST 31 5 - 40 U/L               Assessment & Plan: The following diagnoses and conditions are stable with no further orders unless indicated:  1. Essential hypertension  BP Readings from Last 3 Encounters:   01/13/22 118/72   11/02/21 128/79   07/12/21 130/74     Blood pressure stable    2. Hypercholesterolemia  Lab Results   Component Value Date    CHOL 214 (H) 01/07/2022    CHOL 164 10/18/2019    CHOL 162 10/16/2018     Lab Results   Component Value Date    TRIG 213 (H) 01/07/2022    TRIG 147 10/18/2019    TRIG 173 (H) 10/16/2018     Lab Results   Component Value Date    HDL 47 (L) 01/07/2022    HDL 35 (L) 12/16/2020    HDL 47 (L) 10/18/2019     Lab Results   Component Value Date    LDLCALC 124 01/07/2022    [address] 90 12/16/2020    LDLCALC 88 10/18/2019     No results found for: LABVLDL, VLDL  No results found for: CHOLHDLRATIO  Considerable increase in his cholesterol from last visit. His cholesterol has been stable. Discussed lifestyle changes such as diet and exercise. Recommended eliminate processed food from diet such as sugar and fried foods. Recommended exercising at least 150 minutes/week. Try to do full body resistance training twice a week as well. Offered suggestions for calorie counting such as phone apps and online resources such as My fitness pal and Lose it. Discussed healthy weight. - Lipid Panel; Future    3. Hereditary hemochromatosis ([address]  Continue with recommendations per gastroenterology.   Update his vaccination status today  - Hep A Vaccine Adult (HAVRIX)  - Hep B Vaccine Adult (ENGERIX-B)  - Comprehensive Metabolic Panel; Future    4. GERD  Continue omeprazole 20 mg daily      1 to 18 years  Two doses at least 6 months apart.    _______________________________________________________________    HAVRIX ® 1  Licensed dosages and schedules for HAVRIX ® 1   Age Dose (JEAN PIERRE units)2 Volume (mL) No. of doses Schedule (mos)3   12 mos18 yrs 720 0.5 2 0,6-12   ?19 years 1,440 1.0 2 0,6-12   1Hepatitis A vaccine, inactivated, [employer]. 2Enzyme-linked immunosorbent assay units. 30 months represents timing of the initial dose; subsequent numbers represent months after the initial dose. VAQTA ® 1  Licensed dosages and schedules for VAQTA ® 1   Age Dose (U.)2 Volume (mL) No. of doses Schedule (mos)3   12 mos18 yrs 25 0.5 2 0,6-18   ?19 years 50 1.0 2 0,6-18   1Hepatitis A vaccine, inactivated, [employer]  2Units. 30 months represents timing of the initial dose; subsequent numbers represent months after the initial dose. TWINRIX ® 1 (HepAHepB) Vaccine Schedule (Not recommended for post exposure prophylaxis)  Licensed dosages and schedules for TWINRIX ® 1   Age Dose (JEAN PIERRE units)2 Volume (mL) No. of doses Schedule   ? 18 yrs 720 1.0 3 0, 1, 6 mos   ? 18 yrs 720 1.0 4 0, 7, 2130 days + 12 mos3   1Combined hepatitis A and hepatitis B vaccine, inactivated, [employer]oSmith[employer]line. 2Enzyme-linked immunosorbent assay units. 3This 4-dose schedule enables patients to receive 3 doses in 21 days; this schedule is used prior to planned exposure with short notice and requires a fourth dose at 12 months. Return in about 6 months (around 7/13/2022) for Yearly Physical - 30 minute visit. Discussed use, benefit, and side effects of prescribed medications. All patient questions answered. Pt voiced understanding. Reviewed health maintenance. Instructedto continue current medications, diet and exercise.   Patient agreed with treatmentplan. Follow up as directed. Note dictated using Dragon Dictation software  Sometimes this dictation software makes erroneous transcriptions.

## 2022-01-31 DIAGNOSIS — I10 ESSENTIAL (PRIMARY) HYPERTENSION: ICD-10-CM

## 2022-01-31 RX ORDER — LISINOPRIL 20 MG/1
TABLET ORAL
Qty: 30 TABLET | Refills: 4 | Status: SHIPPED | OUTPATIENT
Start: 2022-01-31

## 2022-01-31 RX ORDER — SILDENAFIL 100 MG/1
TABLET, FILM COATED ORAL
Qty: 3 TABLET | Refills: 4 | Status: SHIPPED | OUTPATIENT
Start: 2022-01-31

## 2022-01-31 NOTE — TELEPHONE ENCOUNTER
Lv Felipe called requesting a refill of the below medication which has been pended for you:     Requested Prescriptions     Pending Prescriptions Disp Refills    sildenafil (VIAGRA) 100 MG tablet [Pharmacy Med Name: SILDENAFIL 100MG TABLET] 3 tablet 4     Sig: TAKE 1 TABLET BY MOUTH AS NEEDED FOR E.D.   Mcpherson lisinopril (PRINIVIL;ZESTRIL) 20 MG tablet [Pharmacy Med Name: LISINOPRIL 20MG TABLET] 30 tablet 4     Sig: TAKE 1 TABLET BY MOUTH ONCE DAILY       Last Appointment Date: 1/13/2022  Next Appointment Date: 2/14/2022    Allergies   Allergen Reactions    Codeine

## 2022-02-01 ENCOUNTER — LAB (OUTPATIENT)
Dept: LAB | Facility: HOSPITAL | Age: 67
End: 2022-02-01

## 2022-02-01 LAB — SARS-COV-2 ORF1AB RESP QL NAA+PROBE: DETECTED

## 2022-02-01 PROCEDURE — U0004 COV-19 TEST NON-CDC HGH THRU: HCPCS | Performed by: INTERNAL MEDICINE

## 2022-02-01 PROCEDURE — C9803 HOPD COVID-19 SPEC COLLECT: HCPCS | Performed by: INTERNAL MEDICINE

## 2022-02-02 ENCOUNTER — TELEPHONE (OUTPATIENT)
Dept: GASTROENTEROLOGY | Facility: CLINIC | Age: 67
End: 2022-02-02

## 2022-02-02 NOTE — TELEPHONE ENCOUNTER
Kvng morfin'd call last night that pt is Covid positive-he is on the schedule for Friday. I told her I would have you call him this morning to give him results and r/s his procedure. Thanks!

## 2022-02-02 NOTE — TELEPHONE ENCOUNTER
Spoke with pt and told him he was covid positive. Advised him to call Dr. Monteiro's office to ask what he should do. We r/s him to 2/25 @ 6:45 am.

## 2022-02-03 ENCOUNTER — TELEPHONE (OUTPATIENT)
Dept: FAMILY MEDICINE CLINIC | Age: 67
End: 2022-02-03

## 2022-02-03 NOTE — TELEPHONE ENCOUNTER
Spoke with pt and advised him of the following. He says he is not having any symptoms at all and feels fine at the moment. Advised to isolate for 5 days and call the office if he gets to feelimng bad. It is recommend that you start a regimen of vitD, vitC, vitB complex, & zinc.  You may also take melatonin 10mg at night to help your body relax so that you can rest efficiently. Tylenol is advised for body aches, fever, & overall comfort. Ensure that you are staying hydrated! It would be very beneficial if you have or are able to obtain a pulse ox to monitor your oxygen level if you were to become short of breath. If your oxygen level was to reach 94% or below, you would need to seek medical attention as at that point you may need oxygen or other treatment that you would not be able to provide yourself at home. If you have any further questions, please let us know.

## 2022-02-14 ENCOUNTER — NURSE ONLY (OUTPATIENT)
Dept: FAMILY MEDICINE CLINIC | Age: 67
End: 2022-02-14
Payer: COMMERCIAL

## 2022-02-14 DIAGNOSIS — Z23 NEED FOR HEPATITIS B VACCINATION: Primary | ICD-10-CM

## 2022-02-14 PROCEDURE — 90471 IMMUNIZATION ADMIN: CPT | Performed by: FAMILY MEDICINE

## 2022-02-14 PROCEDURE — 90746 HEPB VACCINE 3 DOSE ADULT IM: CPT | Performed by: FAMILY MEDICINE

## 2022-04-01 ENCOUNTER — TELEPHONE (OUTPATIENT)
Dept: GASTROENTEROLOGY | Facility: CLINIC | Age: 67
End: 2022-04-01

## 2022-04-01 ENCOUNTER — TELEPHONE (OUTPATIENT)
Dept: GASTROENTEROLOGY | Facility: HOSPITAL | Age: 67
End: 2022-04-01

## 2022-04-01 NOTE — TELEPHONE ENCOUNTER
Tried to call pt to discuss-was unable to reach him and VM full. I will try him again later if he doesn't call me back.

## 2022-04-04 NOTE — TELEPHONE ENCOUNTER
Called and spoke to pt about endo-he tells me late Thursday night his  back out on him. He tells me he tried to call office but no one answered after hours. I offered to r/s his appt-he needs to check with his  and call me back to schedule. He has our number and will call us once he knows when he can have procedure.

## 2022-04-14 ENCOUNTER — TELEPHONE (OUTPATIENT)
Dept: GASTROENTEROLOGY | Facility: CLINIC | Age: 67
End: 2022-04-14

## 2022-04-14 ENCOUNTER — LAB (OUTPATIENT)
Dept: LAB | Facility: HOSPITAL | Age: 67
End: 2022-04-14

## 2022-04-14 ENCOUNTER — TRANSCRIBE ORDERS (OUTPATIENT)
Dept: LAB | Facility: HOSPITAL | Age: 67
End: 2022-04-14

## 2022-04-14 DIAGNOSIS — E83.110 HEREDITARY HEMOCHROMATOSIS: Primary | ICD-10-CM

## 2022-04-14 LAB
DEPRECATED RDW RBC AUTO: 44.6 FL (ref 37–54)
ERYTHROCYTE [DISTWIDTH] IN BLOOD BY AUTOMATED COUNT: 13.1 % (ref 12.3–15.4)
FERRITIN SERPL-MCNC: 36.16 NG/ML (ref 30–400)
HCT VFR BLD AUTO: 47.3 % (ref 37.5–51)
HGB BLD-MCNC: 15.8 G/DL (ref 13–17.7)
MCH RBC QN AUTO: 31.2 PG (ref 26.6–33)
MCHC RBC AUTO-ENTMCNC: 33.4 G/DL (ref 31.5–35.7)
MCV RBC AUTO: 93.3 FL (ref 79–97)
PLATELET # BLD AUTO: 124 10*3/MM3 (ref 140–450)
PMV BLD AUTO: 12.3 FL (ref 6–12)
RBC # BLD AUTO: 5.07 10*6/MM3 (ref 4.14–5.8)
WBC NRBC COR # BLD: 7.48 10*3/MM3 (ref 3.4–10.8)

## 2022-04-14 PROCEDURE — 36415 COLL VENOUS BLD VENIPUNCTURE: CPT | Performed by: INTERNAL MEDICINE

## 2022-04-14 PROCEDURE — 85027 COMPLETE CBC AUTOMATED: CPT | Performed by: INTERNAL MEDICINE

## 2022-04-14 PROCEDURE — 82728 ASSAY OF FERRITIN: CPT | Performed by: INTERNAL MEDICINE

## 2022-04-14 NOTE — TELEPHONE ENCOUNTER
Please let him know that his ferritin level remains good and it actually on the low side.  Did they do a phleb this week??   I can see he had a phleb 1/3/2022.  Was that his last one?  If so, then lets plan to redo after July 4th.   Lets stretch out his phlebotomies to every 6 months.     Dorothea Sales MD

## 2022-04-15 NOTE — TELEPHONE ENCOUNTER
Called and spoke to pt re: results-he VU. He did not have phlebotomy yesterday due to Ferritin below 50-they have already scheduled his next phlebotomy to 8/15/22. I advised him that I would have you sign a new form and send that to the lab-Aleta will reach out to him to r/s for after 7/4/22. Just wanted you to know-I will leave new form on your desk to sign!

## 2022-04-18 NOTE — TELEPHONE ENCOUNTER
I called and spoke to Aleta in the Lab-she will reach out to pt to r/s his phlebotomy to July. I got a signature from Dr. Sales on Friday and will fax new form to Aleta. Pt is aware and both pt/Aleta advised to call me with any questions/problems going forward.

## 2022-04-29 DIAGNOSIS — Z01.818 PREOPERATIVE TESTING: Primary | ICD-10-CM

## 2022-05-02 ENCOUNTER — LAB (OUTPATIENT)
Dept: LAB | Facility: HOSPITAL | Age: 67
End: 2022-05-02

## 2022-05-02 LAB — SARS-COV-2 ORF1AB RESP QL NAA+PROBE: NOT DETECTED

## 2022-05-02 PROCEDURE — C9803 HOPD COVID-19 SPEC COLLECT: HCPCS

## 2022-05-02 PROCEDURE — U0004 COV-19 TEST NON-CDC HGH THRU: HCPCS | Performed by: INTERNAL MEDICINE

## 2022-05-04 ENCOUNTER — ANESTHESIA (OUTPATIENT)
Dept: GASTROENTEROLOGY | Facility: HOSPITAL | Age: 67
End: 2022-05-04

## 2022-05-04 ENCOUNTER — ANESTHESIA EVENT (OUTPATIENT)
Dept: GASTROENTEROLOGY | Facility: HOSPITAL | Age: 67
End: 2022-05-04

## 2022-05-04 ENCOUNTER — HOSPITAL ENCOUNTER (OUTPATIENT)
Facility: HOSPITAL | Age: 67
Setting detail: HOSPITAL OUTPATIENT SURGERY
Discharge: HOME | End: 2022-05-04
Attending: INTERNAL MEDICINE | Admitting: INTERNAL MEDICINE

## 2022-05-04 VITALS
HEART RATE: 53 BPM | BODY MASS INDEX: 31.39 KG/M2 | SYSTOLIC BLOOD PRESSURE: 118 MMHG | OXYGEN SATURATION: 92 % | HEIGHT: 67 IN | TEMPERATURE: 97.3 F | DIASTOLIC BLOOD PRESSURE: 70 MMHG | WEIGHT: 200 LBS | RESPIRATION RATE: 14 BRPM

## 2022-05-04 DIAGNOSIS — K31.A0 INTESTINAL METAPLASIA OF GASTRIC MUCOSA: ICD-10-CM

## 2022-05-04 PROBLEM — K25.3 ACUTE GASTRIC ULCER WITHOUT HEMORRHAGE OR PERFORATION: Status: RESOLVED | Noted: 2021-07-19 | Resolved: 2022-05-04

## 2022-05-04 PROBLEM — B96.81 HELICOBACTER PYLORI GASTRITIS: Status: RESOLVED | Noted: 2021-07-19 | Resolved: 2022-05-04

## 2022-05-04 PROBLEM — K25.3 ACUTE GASTRIC EROSION: Status: RESOLVED | Noted: 2021-07-19 | Resolved: 2022-05-04

## 2022-05-04 PROBLEM — K29.70 HELICOBACTER PYLORI GASTRITIS: Status: RESOLVED | Noted: 2021-07-19 | Resolved: 2022-05-04

## 2022-05-04 PROCEDURE — 43239 EGD BIOPSY SINGLE/MULTIPLE: CPT | Performed by: INTERNAL MEDICINE

## 2022-05-04 PROCEDURE — 25010000002 PROPOFOL 10 MG/ML EMULSION: Performed by: NURSE ANESTHETIST, CERTIFIED REGISTERED

## 2022-05-04 PROCEDURE — 88305 TISSUE EXAM BY PATHOLOGIST: CPT | Performed by: INTERNAL MEDICINE

## 2022-05-04 PROCEDURE — 88342 IMHCHEM/IMCYTCHM 1ST ANTB: CPT | Performed by: INTERNAL MEDICINE

## 2022-05-04 RX ORDER — SODIUM CHLORIDE 9 MG/ML
500 INJECTION, SOLUTION INTRAVENOUS CONTINUOUS PRN
Status: DISCONTINUED | OUTPATIENT
Start: 2022-05-04 | End: 2022-05-04 | Stop reason: HOSPADM

## 2022-05-04 RX ORDER — LIDOCAINE HYDROCHLORIDE 10 MG/ML
0.5 INJECTION, SOLUTION EPIDURAL; INFILTRATION; INTRACAUDAL; PERINEURAL ONCE AS NEEDED
Status: CANCELLED | OUTPATIENT
Start: 2022-05-04

## 2022-05-04 RX ORDER — LIDOCAINE HYDROCHLORIDE 20 MG/ML
INJECTION, SOLUTION EPIDURAL; INFILTRATION; INTRACAUDAL; PERINEURAL AS NEEDED
Status: DISCONTINUED | OUTPATIENT
Start: 2022-05-04 | End: 2022-05-04 | Stop reason: SURG

## 2022-05-04 RX ORDER — SODIUM CHLORIDE 0.9 % (FLUSH) 0.9 %
10 SYRINGE (ML) INJECTION AS NEEDED
Status: DISCONTINUED | OUTPATIENT
Start: 2022-05-04 | End: 2022-05-04 | Stop reason: HOSPADM

## 2022-05-04 RX ORDER — PROPOFOL 10 MG/ML
VIAL (ML) INTRAVENOUS AS NEEDED
Status: DISCONTINUED | OUTPATIENT
Start: 2022-05-04 | End: 2022-05-04 | Stop reason: SURG

## 2022-05-04 RX ADMIN — SODIUM CHLORIDE 500 ML: 9 INJECTION, SOLUTION INTRAVENOUS at 07:25

## 2022-05-04 RX ADMIN — LIDOCAINE HYDROCHLORIDE 100 MG: 20 INJECTION, SOLUTION EPIDURAL; INFILTRATION; INTRACAUDAL; PERINEURAL at 07:39

## 2022-05-04 RX ADMIN — PROPOFOL 200 MG: 10 INJECTION, EMULSION INTRAVENOUS at 07:39

## 2022-05-04 NOTE — H&P
Chief Complaint:   Intestinal metaplasia of the stomach    Subjective     HPI:   He has known intestinal metaplasia of the stomach.  He is here for intestinal metaplasia and mapping.    Past Medical History:   Past Medical History:   Diagnosis Date   • Arthritis    • Diverticulosis    • ED (erectile dysfunction)    • Family history of colonic polyps    • GERD (gastroesophageal reflux disease)    • History of adenomatous polyp of colon    • History of colon polyps    • Hypertension        Past Surgical History:  Past Surgical History:   Procedure Laterality Date   • COLONOSCOPY N/A 2/10/2017    One 7mm sessile serated adenomatous polyp in the ascending colon; Two 5-7mm adenomatous polyps in the transverse colon; One 4mm hyperplastic polyp in the rectum; Repeat 3 years   • COLONOSCOPY  10/25/2012    One 6mm adenomatous polyp in the sigmoid colon; Repeat 4 years   • COLONOSCOPY N/A 2/24/2020    Diverticulosis in the left colon; The examination was otherwise normal on direct and retroflexion views; No specimens collected; Repeat 5 years   • ENDOSCOPY N/A 7/19/2021    Non-bleeding gastric ulcer with no stigmata of bleeding-biopsied; Non-bleeding erosive gastropathy; Normal examined duodenum; Repeat 2 months   • ENDOSCOPY N/A 9/20/2021    Small HH; Erythematous mucosa in the antrum-biopsied; Normal examined duodenum   • MULTIPLE TOOTH EXTRACTIONS     • TONSILLECTOMY         Family History:  Family History   Problem Relation Age of Onset   • Colon polyps Sister         < 60 years of age   • No Known Problems Mother    • Heart disease Father    • Colon cancer Neg Hx    • Esophageal cancer Neg Hx    • Liver cancer Neg Hx    • Liver disease Neg Hx    • Rectal cancer Neg Hx    • Stomach cancer Neg Hx        Social History:   reports that he has never smoked. He has never used smokeless tobacco. He reports previous alcohol use. He reports that he does not use drugs.    Medications:   Medications Prior to Admission  "  Medication Sig Dispense Refill Last Dose   • lisinopril (PRINIVIL,ZESTRIL) 20 MG tablet Take 20 mg by mouth Daily.   5/2/2022   • pantoprazole (PROTONIX) 40 MG EC tablet Take 1 tablet by mouth Daily. 30 tablet 11 5/2/2022       Allergies:  Codeine    ROS:    General: Weight stable  Resp: No SOA  Cardiovascular: No CP      Objective     /86 (Patient Position: Sitting)   Pulse 50   Temp 97.3 °F (36.3 °C) (Temporal)   Resp 20   Ht 170.2 cm (67\")   Wt 90.7 kg (200 lb)   SpO2 97%   BMI 31.32 kg/m²     Physical Exam   Constitutional: Pt is oriented to person, place, and in no distress.  Eyes: No icterus  ENMT: Unremarkable   Cardiovascular: Normal rate, regular rhythm.    Pulmonary/Chest: No distress.  No audible wheezes  Abdominal: Soft.   Skin: Skin is warm and dry.   Psychiatric: Mood, memory, affect and judgment appear normal.     Assessment/Plan     Diagnosis:  Intestinal metaplasia of the stomach    Anticipated Surgical Procedure:  Endoscopy    The risks, benefits, and alternatives of endoscopy were reviewed with the patient today.  Risks including perforation, with or without dilation, possibly requiring surgery.  Additional risks include risk of bleeding.  There is also the risk of a drug reaction or problems with anesthesia.  This will be discussed with the further by the anesthesia team on the day of the procedure. The benefits include the diagnosis and management of disease of the upper digestive tract.  Alternatives to endoscopy include upper GI series, laboratory testing, radiographic evaluation, or no intervention.  The patient verbalizes understanding and agrees.    Much of this encounter note is an electronic transcription/translation of spoken language to printed text. The electronic translation of spoken language may permit erroneous, or at times, nonsensical words or phrases to be inadvertently transcribed; although I have reviewed the note for such errors, some may still exist.  "

## 2022-05-04 NOTE — ANESTHESIA POSTPROCEDURE EVALUATION
"Patient: Frankie Reid    Procedure Summary     Date: 05/04/22 Room / Location: [facility] ENDOSCOPY 6 / [facility] ENDOSCOPY    Anesthesia Start: 0739 Anesthesia Stop: 0754    Procedure: ESOPHAGOGASTRODUODENOSCOPY WITH ANESTHESIA (N/A ) Diagnosis:       Intestinal metaplasia of gastric mucosa      (Intestinal metaplasia of gastric mucosa [K31.A0])    Surgeons: Dorothea Sales MD Provider: Primo Dos Santos CRNA    Anesthesia Type: MAC ASA Status: 2          Anesthesia Type: MAC    Vitals  No vitals data found for the desired time range.          Post Anesthesia Care and Evaluation    Patient location during evaluation: PHASE II  Patient participation: complete - patient participated  Level of consciousness: awake and alert  Pain management: adequate  Airway patency: patent  Anesthetic complications: No anesthetic complications    Cardiovascular status: acceptable  Respiratory status: acceptable  Hydration status: acceptable    Comments: Blood pressure 137/86, pulse 50, temperature 97.3 °F (36.3 °C), temperature source Temporal, resp. rate 20, height 170.2 cm (67\"), weight 90.7 kg (200 lb), SpO2 97 %.    Pt discharged from PACU based on telma score >8      "

## 2022-05-06 LAB
CYTO UR: NORMAL
LAB AP CASE REPORT: NORMAL
PATH REPORT.FINAL DX SPEC: NORMAL
PATH REPORT.GROSS SPEC: NORMAL

## 2022-06-19 NOTE — PROGRESS NOTES
Primary Physician: Ermias Monteiro MD    Chief Complaint   Patient presents with   • Follow-up     Pt presents today for intestinal metaplasia and hereditary hemochromatosis follow up-had endo 5/4/2022; Pt states he is feeling good and is scheduled for next phlebotomy 7/6/22       Subjective     Frankie Reid is a 67 y.o. male.    HPI   Gastric intestinal metaplasia with h/o gastric ulcers  Endoscopy was done in September 2021 to follow-up on gastric ulcer previously been seen 7/2021.  Biopsies from the antrum showed gastric intestinal metaplasia.  H. pylori biopsy was negative.  He had previously had H pylori in July which was treated and then found to be cured.  Metaplasia gastric mapping via biopsy done 5/2022.  No neoplasia noted.  Plan to repeat endoscopy 5/2025.     Genetic hemochromatosis  Patient had CTA at [facility] 4/5/2021 for other reasons which showed nodular changes of the liver concerning for cirrhosis.  LFTs have been normal.  Laboratory panel through this office shows high ferritin in the 700 range.  He is homozygous for H63D mutation.  No family history of liver disease.   He was drinking 1-2 cases of beer a week or 1/5th whiskey a week.  He stopped alcohol on April 4, 2021, but he has resumed a beer about 2x/week.     Sonogram of the liver with elastography 6/2021 shows F3/F4 status.  No masses noted.  Endoscopy 2021 does not show varices.  Ferritin in correct range starting 9/2021 after a series of phlebotomy.    Was on every 3 month cycle in 2021 and this was changed to every 6 months in 2022.   Next phleb due 7/6/2022.     Adenomatous colon polyps and family hx colon polyps  Adenomas were removed in 2012 and 2017.  Last colonoscopy done 2/2020 and unremarkable.  He will need a repeat colonoscopy in 2/2025.  His sister also has had colon polyps less than 60 years old.      Past Medical History:   Diagnosis Date   • Arthritis    • Diverticulosis    • ED (erectile dysfunction)    •  Family history of colonic polyps    • GERD (gastroesophageal reflux disease)    • Hereditary hemochromatosis (HCC)    • History of adenomatous polyp of colon    • History of colon polyps    • Hypertension        Past Surgical History:   Procedure Laterality Date   • COLONOSCOPY N/A 02/10/2017    One 7mm sessile serated adenomatous polyp in the ascending colon; Two 5-7mm adenomatous polyps in the transverse colon; One 4mm hyperplastic polyp in the rectum; Repeat 3 years   • COLONOSCOPY  10/25/2012    One 6mm adenomatous polyp in the sigmoid colon; Repeat 4 years   • COLONOSCOPY N/A 02/24/2020    Diverticulosis in the left colon; The examination was otherwise normal on direct and retroflexion views; No specimens collected; Repeat 5 years   • ENDOSCOPY N/A 07/19/2021    Non-bleeding gastric ulcer with no stigmata of bleeding-biopsied; Non-bleeding erosive gastropathy; Normal examined duodenum; Repeat 2 months   • ENDOSCOPY N/A 09/20/2021    Small HH; Erythematous mucosa in the antrum-biopsied; Normal examined duodenum   • ENDOSCOPY N/A 05/04/2022    LA Grade A reflux esophagitis; Erythematous mucosa in the antrum-biopsied; Normal examined duodenum   • MULTIPLE TOOTH EXTRACTIONS     • TONSILLECTOMY          Current Outpatient Medications:   •  lisinopril (PRINIVIL,ZESTRIL) 20 MG tablet, Take 20 mg by mouth Daily., Disp: , Rfl:   •  pantoprazole (PROTONIX) 40 MG EC tablet, Take 1 tablet by mouth Daily., Disp: 30 tablet, Rfl: 11    Allergies   Allergen Reactions   • Codeine Nausea And Vomiting       Social History     Socioeconomic History   • Marital status:    Tobacco Use   • Smoking status: Never Smoker   • Smokeless tobacco: Never Used   Vaping Use   • Vaping Use: Never used   Substance and Sexual Activity   • Alcohol use: Not Currently   • Drug use: No   • Sexual activity: Defer       Family History   Problem Relation Age of Onset   • Colon polyps Sister         < 60 years of age   • No Known Problems Mother   "  • Heart disease Father    • Colon cancer Neg Hx    • Esophageal cancer Neg Hx    • Liver cancer Neg Hx    • Liver disease Neg Hx    • Rectal cancer Neg Hx    • Stomach cancer Neg Hx        Review of Systems   Respiratory: Negative for shortness of breath.    Cardiovascular: Negative for chest pain.   Musculoskeletal: Positive for arthralgias (knee pain).       Objective     /72 (BP Location: Left arm, Patient Position: Sitting, Cuff Size: Adult)   Pulse 67   Temp 96.9 °F (36.1 °C) (Infrared)   Ht 170.2 cm (67\")   Wt 92.5 kg (204 lb)   SpO2 97%   BMI 31.95 kg/m²     Physical Exam  Constitutional:       Appearance: He is well-developed.   Pulmonary:      Effort: Pulmonary effort is normal.   Musculoskeletal:         General: Normal range of motion.   Skin:     General: Skin is warm.   Neurological:      Mental Status: He is alert and oriented to person, place, and time.   Psychiatric:         Behavior: Behavior normal.         Lab Results - Last 18 Months   Lab Units 01/07/22  1040 05/11/21  0952   GLUCOSE mg/dL 95 92   BUN mg/dL 18 15   CREATININE mg/dL 1.3* 1.13   SODIUM mmol/L 142 139   POTASSIUM mmol/L 4.1 4.0   CHLORIDE mmol/L 102 104   TOTAL CO2 mmol/L 27  --    CO2 mmol/L  --  25.0   TOTAL PROTEIN g/dL 7.5 7.3   ALBUMIN g/dL 4.3 4.30   ALT (SGPT) U/L 35 29   AST (SGOT) U/L 31 21   ALK PHOS U/L 97 73   BILIRUBIN mg/dL 0.8 0.9   GLOBULIN gm/dL  --  3.0   ALPHA-FETOPROTEIN ng/mL  --  3.61       Lab Results - Last 18 Months   Lab Units 04/14/22  0713 01/07/22  1040 01/03/22  0757 09/29/21  0808 09/01/21  0803 08/18/21  0804 07/07/21  0823 04/05/21  1500   HEMOGLOBIN g/dL 15.8 14.7 15.9 14.4 14.6 14.6   < > 15.9   HEMATOCRIT % 47.3 46.3 48.3 43.7 42.4 41.9   < > 46.7   MCV fL 93.3 94.7* 91.5 97.8* 96.1 95.7   < > 97.1*   WBC 10*3/mm3 7.48 7.9 7.45 6.99 7.51 7.41   < > 8.2   RDW % 13.1 14.2 14.2 12.2* 12.4 12.5   < > 12.2   MPV fL 12.3* 12.2 11.8 11.7 12.0 11.9   < > 11.9   PLATELETS 10*3/mm3 124* 155 " 131* 135* 127* 143   < > 125*   INR   --   --   --   --   --   --   --  1.03    < > = values in this interval not displayed.       Lab Results - Last 18 Months   Lab Units 04/14/22  0713 01/07/22  1040 01/03/22  0757 09/29/21  0808 09/01/21  0803 08/18/21  0804 08/04/21  0804 07/07/21  0823 05/11/21  0952   IRON mcg/dL  --   --   --   --   --   --   --   --  166*   TIBC mcg/dL  --   --   --   --   --   --   --   --  294*   IRON SATURATION %  --   --   --   --   --   --   --   --  57*   FERRITIN ng/mL 36.16  --  57.29 87.56 233.00 308.40 433.90*   < > 737.00*   TSH uIU/mL  --  1.080  --   --   --   --   --   --   --     < > = values in this interval not displayed.        Lab Results - Last 18 Months   Lab Units 04/14/22  0713 07/07/21  0823 06/01/21  1045 05/11/21  0952   HEP B C IGM   --   --   --  Non-Reactive   HEP B S AG   --   --   --  Non-Reactive   ALPHA-FETOPROTEIN ng/mL  --   --   --  3.61   FERRITIN ng/mL 36.16   < >  --  737.00*   CERULOPLASMIN mg/dL  --   --   --  19   MITOCHONDRIAL AB Units  --   --   --  <20.0   A1 ANTITRYPSIN mg/dL  --   --   --  139   HEMOCHROMATOSIS GENE   --   --  Comment  --     < > = values in this interval not displayed.       EXAM: US ELASTOGRAPHY PARENCHYMA-, US LIVER- - 6/1/2021 12:08 PM CDT     HISTORY: abnormal imaging on CT of liver; R93.2-Abnormal findings on  diagnostic imaging of liver and biliary tract       COMPARISON: Report from outside ultrasound from 04/29/2021.      TECHNIQUE: Real-time ultrasound performed with representative images  saved to PACS. Exam performed using Siemens pulse shear wave  elastography (pSWE) technology.     FINDINGS:  LIVER: Coarsened echotexture of the liver. Nodular contour of the liver.  No focal lesion identified.     10 shear wave measurements were acquired and demonstrate a median  velocity of 1.6 m/s. The IQR/median velocity ratio is 0.07.     Metavir Score F>/=3     BILE DUCTS AND GALLBLADDER. No definite acute abnormality in  the  gallbladder. Normal gallbladder wall thickness. Common bile duct  measures 0.3 cm.     PANCREAS. Partially visualized pancreas within normal limits.     IVC. Visualized IVC within normal limits.     AORTA. Visualized abdominal aorta within normal limits.     RIGHT KIDNEY: The RIGHT kidney measures 10.1 x 5.1 x 5.8 cm. No  hydronephrosis or parenchymal abnormality.     OTHER: No definite ascites.        IMPRESSION:  1. Course and echotexture of the liver with nodular contour suggestive  of cirrhosis.  2. Metavir Score F>/=3           Liver Fibrosis Staging      Metavir                    m/s     Mild-Moderate                    F2                     1.34 m/s     Moderate-Severe               F3                     1.55 m/s     Cirrhosis                              F4                     1.8 m/s        Shear wave measurements may be affected by, hepatic congestion,  steatosis, and inflammation.  This report was finalized on 06/01/2021 15:16 by Dr. Ivan King MD.      IMPRESSION/PLAN:    Assessment & Plan      Problem List Items Addressed This Visit        Endocrine and Metabolic    Hereditary hemochromatosis (HCC) - Primary    Overview     Homozygous for H63D.  Ferritin 700 range originally and corrected with phlebotomy. Pt aware that hereditary hemochromatosis is an independent risk factor for hepatocellular carcinoma.  He will need laboratories and serial sonograms of liver done on a regular basis.    Ferritin in correct range starting 9/2021 after a series of phlebotomy.  Was on every 3 month cycle in 2021.  Ferritin 57 12/2021 and 37 in 4/2022. Will change to every 6 month cycle.    No varices on endoscopy 2021 and again in 2022.    Coffee consumption has been shown to decrease the risk of HCC in patients with chronic liver disease.  In these patients, coffee consumption should be encouraged.           Relevant Orders    US Liver    AFP Tumor Marker    Comprehensive Metabolic Panel       Family History     Family history of polyps in the colon    Overview     Sister had colon polyps less than 60 years old.              Gastrointestinal Abdominal     History of adenomatous polyp of colon    Overview     Adenomatous colon polyps were removed in 2012 and 2017.  Last colonoscopy 2/2020 unremarkable.  He will be due for repeat colonoscopy in 2/2025.           Abnormal finding on imaging of liver    Overview     CTA of the chest shows nodular liver suggestive of cirrhosis.  Subsequent sonogram of the liver performed at [facility] shows coarse hepatic texture.  No focal mass.  LFTs are normal.  Full serological evaluation unremarkable with exception of a ferritin in the 700 range with increased iron saturation. Homozygous for H63D mutation.  Fib 4 score is 2 which is nondiagnostic for advanced fibrosis. Elastography = F3.    He has had the first 2 immunizations for Hep A and B.  He will complete the last one 6 months after the first injection.           Intestinal metaplasia of gastric mucosa    Overview     Found on endoscopy 2021.  Mapping done 5/2022 and intestinal metaplasia found involving the body of the stomach.  The antrum was spared.  H pylori has already been eradicated.  Will need repeat endoscopy 5/2025.               MEDICAL COMPLEXITY must have 2 out of 3   Moderate Complexity Level 4                                                                                                              1 of the following medical problems:                                                                                               []One chronic illness with mild exacerbation                                                                                [x]Two or more stable chronic illness                                                                                              []One new problem  []One acute illness with systemic symptoms     Complexity of Data  Reviewed (1 out of the 3 following categories)                                              Category 1 tests, documents, historian (must have 3 points)                                                      []Review of prior external records  [x]Review of results of unique tests  [x]Ordering unique tests   []Assessment requires an independent historian   Category 2 Interpretation of tests     []Independent interpretation of test read by another doc   Category 3 Discuss Management/tests  []Discussion with external physician     Risk of complications and/or morbidity                                                                                           []Prescription Drug Management     []Decision for elective endoscopic procedure                RTC one year      Dorothea Sales MD  06/22/22  14:11 CDT    Much of this encounter note is an electronic transcription/translation of spoken language to printed text. The electronic translation of spoken language may permit erroneous, or at times, nonsensical words or phrases to be inadvertently transcribed; although I have reviewed the note for such errors, some may still exist.

## 2022-06-22 ENCOUNTER — OFFICE VISIT (OUTPATIENT)
Dept: GASTROENTEROLOGY | Facility: CLINIC | Age: 67
End: 2022-06-22

## 2022-06-22 VITALS
WEIGHT: 204 LBS | DIASTOLIC BLOOD PRESSURE: 72 MMHG | SYSTOLIC BLOOD PRESSURE: 124 MMHG | OXYGEN SATURATION: 97 % | HEART RATE: 67 BPM | HEIGHT: 67 IN | TEMPERATURE: 96.9 F | BODY MASS INDEX: 32.02 KG/M2

## 2022-06-22 DIAGNOSIS — R93.2 ABNORMAL FINDING ON IMAGING OF LIVER: ICD-10-CM

## 2022-06-22 DIAGNOSIS — E83.110 HEREDITARY HEMOCHROMATOSIS: Primary | ICD-10-CM

## 2022-06-22 DIAGNOSIS — Z86.010 HISTORY OF ADENOMATOUS POLYP OF COLON: ICD-10-CM

## 2022-06-22 DIAGNOSIS — K31.A0 INTESTINAL METAPLASIA OF GASTRIC MUCOSA: ICD-10-CM

## 2022-06-22 DIAGNOSIS — Z83.71 FAMILY HISTORY OF POLYPS IN THE COLON: ICD-10-CM

## 2022-06-22 PROCEDURE — 99214 OFFICE O/P EST MOD 30 MIN: CPT | Performed by: INTERNAL MEDICINE

## 2022-07-06 ENCOUNTER — LAB (OUTPATIENT)
Dept: LAB | Facility: HOSPITAL | Age: 67
End: 2022-07-06

## 2022-07-06 ENCOUNTER — TRANSCRIBE ORDERS (OUTPATIENT)
Dept: ADMINISTRATIVE | Facility: HOSPITAL | Age: 67
End: 2022-07-06

## 2022-07-06 DIAGNOSIS — E83.110 HEREDITARY HEMOCHROMATOSIS: ICD-10-CM

## 2022-07-06 DIAGNOSIS — E83.110 PIGMENT CIRRHOSIS: ICD-10-CM

## 2022-07-06 DIAGNOSIS — E83.110 PIGMENT CIRRHOSIS: Primary | ICD-10-CM

## 2022-07-06 LAB
ALBUMIN SERPL-MCNC: 4 G/DL (ref 3.5–5.2)
ALBUMIN/GLOB SERPL: 1.3 G/DL
ALP SERPL-CCNC: 79 U/L (ref 39–117)
ALPHA-FETOPROTEIN: 3 NG/ML (ref 0–8.3)
ALT SERPL W P-5'-P-CCNC: 21 U/L (ref 1–41)
ANION GAP SERPL CALCULATED.3IONS-SCNC: 9 MMOL/L (ref 5–15)
AST SERPL-CCNC: 21 U/L (ref 1–40)
BILIRUB SERPL-MCNC: 0.9 MG/DL (ref 0–1.2)
BUN SERPL-MCNC: 22 MG/DL (ref 8–23)
BUN/CREAT SERPL: 17.7 (ref 7–25)
CALCIUM SPEC-SCNC: 9.2 MG/DL (ref 8.6–10.5)
CHLORIDE SERPL-SCNC: 106 MMOL/L (ref 98–107)
CO2 SERPL-SCNC: 25 MMOL/L (ref 22–29)
CREAT SERPL-MCNC: 1.24 MG/DL (ref 0.76–1.27)
DEPRECATED RDW RBC AUTO: 44.1 FL (ref 37–54)
EGFRCR SERPLBLD CKD-EPI 2021: 64.5 ML/MIN/1.73
ERYTHROCYTE [DISTWIDTH] IN BLOOD BY AUTOMATED COUNT: 12.9 % (ref 12.3–15.4)
FERRITIN SERPL-MCNC: 93.93 NG/ML (ref 30–400)
GLOBULIN UR ELPH-MCNC: 3 GM/DL
GLUCOSE SERPL-MCNC: 108 MG/DL (ref 65–99)
HCT VFR BLD AUTO: 46.8 % (ref 37.5–51)
HGB BLD-MCNC: 16.2 G/DL (ref 13–17.7)
Lab: NORMAL
MCH RBC QN AUTO: 32.5 PG (ref 26.6–33)
MCHC RBC AUTO-ENTMCNC: 34.6 G/DL (ref 31.5–35.7)
MCV RBC AUTO: 94 FL (ref 79–97)
PLATELET # BLD AUTO: 121 10*3/MM3 (ref 140–450)
PMV BLD AUTO: 12.5 FL (ref 6–12)
POST-BLOOD PRESSURE: NORMAL MMHG
POTASSIUM SERPL-SCNC: 3.8 MMOL/L (ref 3.5–5.2)
PRE-BLOOD PRESSURE: NORMAL MMHG
PRE-HCT: 46.8 %
PRE-HGB: 16.2 G/DL
PROT SERPL-MCNC: 7 G/DL (ref 6–8.5)
PULSE: 51 BPM
RBC # BLD AUTO: 4.98 10*6/MM3 (ref 4.14–5.8)
SODIUM SERPL-SCNC: 140 MMOL/L (ref 136–145)
VOLUME COLLECTED: 500 ML
WBC NRBC COR # BLD: 8.06 10*3/MM3 (ref 3.4–10.8)

## 2022-07-06 PROCEDURE — 80053 COMPREHEN METABOLIC PANEL: CPT

## 2022-07-06 PROCEDURE — 99195 PHLEBOTOMY: CPT

## 2022-07-06 PROCEDURE — 36415 COLL VENOUS BLD VENIPUNCTURE: CPT

## 2022-07-06 PROCEDURE — 82728 ASSAY OF FERRITIN: CPT

## 2022-07-06 PROCEDURE — 85027 COMPLETE CBC AUTOMATED: CPT

## 2022-07-06 PROCEDURE — 82105 ALPHA-FETOPROTEIN SERUM: CPT

## 2022-07-07 ENCOUNTER — HOSPITAL ENCOUNTER (OUTPATIENT)
Dept: ULTRASOUND IMAGING | Facility: HOSPITAL | Age: 67
Discharge: HOME | End: 2022-07-07
Admitting: INTERNAL MEDICINE

## 2022-07-07 ENCOUNTER — TELEPHONE (OUTPATIENT)
Dept: GASTROENTEROLOGY | Facility: CLINIC | Age: 67
End: 2022-07-07

## 2022-07-07 DIAGNOSIS — E78.00 HYPERCHOLESTEROLEMIA: ICD-10-CM

## 2022-07-07 DIAGNOSIS — E83.110 HEREDITARY HEMOCHROMATOSIS (HCC): ICD-10-CM

## 2022-07-07 DIAGNOSIS — E83.110 HEREDITARY HEMOCHROMATOSIS: ICD-10-CM

## 2022-07-07 PROBLEM — D69.6 THROMBOCYTOPENIA (HCC): Status: ACTIVE | Noted: 2022-07-07

## 2022-07-07 LAB
ALBUMIN SERPL-MCNC: 3.9 G/DL (ref 3.5–5.2)
ALP BLD-CCNC: 77 U/L (ref 40–130)
ALT SERPL-CCNC: 22 U/L (ref 5–41)
ANION GAP SERPL CALCULATED.3IONS-SCNC: 9 MMOL/L (ref 7–19)
AST SERPL-CCNC: 22 U/L (ref 5–40)
BILIRUB SERPL-MCNC: 0.8 MG/DL (ref 0.2–1.2)
BUN BLDV-MCNC: 19 MG/DL (ref 8–23)
CALCIUM SERPL-MCNC: 9.1 MG/DL (ref 8.8–10.2)
CHLORIDE BLD-SCNC: 104 MMOL/L (ref 98–111)
CHOLESTEROL, TOTAL: 177 MG/DL (ref 160–199)
CO2: 28 MMOL/L (ref 22–29)
CREAT SERPL-MCNC: 1.2 MG/DL (ref 0.5–1.2)
GFR AFRICAN AMERICAN: >59
GFR NON-AFRICAN AMERICAN: >60
GLUCOSE BLD-MCNC: 98 MG/DL (ref 74–109)
HDLC SERPL-MCNC: 36 MG/DL (ref 55–121)
LDL CHOLESTEROL CALCULATED: 98 MG/DL
POTASSIUM SERPL-SCNC: 4.1 MMOL/L (ref 3.5–5)
SODIUM BLD-SCNC: 141 MMOL/L (ref 136–145)
TOTAL PROTEIN: 7 G/DL (ref 6.6–8.7)
TRIGL SERPL-MCNC: 216 MG/DL (ref 0–149)

## 2022-07-07 PROCEDURE — 76705 ECHO EXAM OF ABDOMEN: CPT

## 2022-07-07 NOTE — TELEPHONE ENCOUNTER
Please let him know that his sonogram came back as well.  (I just resulted labs) and it is suggesting that he has cirrhosis, but no liver mass.  Lets have him return to see me in the office in December rather than waiting a year.  I will need to check additional blood work more frequently than what I had been doing.  He does not need to do anything any differently in the meantime.    Dorothea Sales MD

## 2022-07-07 NOTE — TELEPHONE ENCOUNTER
Sent [employer] message to pt with results-advised him I would have someone reach out to him to arrange f/u OV. I also advised he call me back with questions/problems in the mean time.     Ct-can you call him at some point and schedule a cirrhosis follow up with Dr. Sales in Dec? Thanks!

## 2022-07-15 ENCOUNTER — OFFICE VISIT (OUTPATIENT)
Dept: FAMILY MEDICINE CLINIC | Age: 67
End: 2022-07-15
Payer: COMMERCIAL

## 2022-07-15 VITALS
HEIGHT: 67 IN | BODY MASS INDEX: 32.18 KG/M2 | HEART RATE: 52 BPM | TEMPERATURE: 97.8 F | WEIGHT: 205 LBS | OXYGEN SATURATION: 97 % | SYSTOLIC BLOOD PRESSURE: 128 MMHG | DIASTOLIC BLOOD PRESSURE: 78 MMHG

## 2022-07-15 DIAGNOSIS — K21.9 GASTROESOPHAGEAL REFLUX DISEASE WITHOUT ESOPHAGITIS: ICD-10-CM

## 2022-07-15 DIAGNOSIS — I10 ESSENTIAL (PRIMARY) HYPERTENSION: Primary | ICD-10-CM

## 2022-07-15 DIAGNOSIS — E83.110 HEREDITARY HEMOCHROMATOSIS (HCC): ICD-10-CM

## 2022-07-15 DIAGNOSIS — Z12.5 ENCOUNTER FOR PROSTATE CANCER SCREENING: ICD-10-CM

## 2022-07-15 DIAGNOSIS — Z92.29 HEPATITIS A AND HEPATITIS B VACCINE ADMINISTERED: ICD-10-CM

## 2022-07-15 DIAGNOSIS — Z23 ENCOUNTER FOR IMMUNIZATION: ICD-10-CM

## 2022-07-15 DIAGNOSIS — M15.9 PRIMARY OSTEOARTHRITIS INVOLVING MULTIPLE JOINTS: ICD-10-CM

## 2022-07-15 DIAGNOSIS — Z13.220 LIPID SCREENING: ICD-10-CM

## 2022-07-15 PROCEDURE — 90471 IMMUNIZATION ADMIN: CPT | Performed by: FAMILY MEDICINE

## 2022-07-15 PROCEDURE — 99214 OFFICE O/P EST MOD 30 MIN: CPT | Performed by: FAMILY MEDICINE

## 2022-07-15 PROCEDURE — 90632 HEPA VACCINE ADULT IM: CPT | Performed by: FAMILY MEDICINE

## 2022-07-15 PROCEDURE — 90472 IMMUNIZATION ADMIN EACH ADD: CPT | Performed by: FAMILY MEDICINE

## 2022-07-15 PROCEDURE — 1123F ACP DISCUSS/DSCN MKR DOCD: CPT | Performed by: FAMILY MEDICINE

## 2022-07-15 PROCEDURE — 90746 HEPB VACCINE 3 DOSE ADULT IM: CPT | Performed by: FAMILY MEDICINE

## 2022-07-15 RX ORDER — MELOXICAM 15 MG/1
15 TABLET ORAL DAILY PRN
Qty: 30 TABLET | Refills: 5 | Status: SHIPPED | OUTPATIENT
Start: 2022-07-15

## 2022-07-15 RX ORDER — OMEPRAZOLE 20 MG/1
20 CAPSULE, DELAYED RELEASE ORAL DAILY PRN
Qty: 30 CAPSULE | Refills: 11 | Status: SHIPPED | OUTPATIENT
Start: 2022-07-15

## 2022-07-15 ASSESSMENT — PATIENT HEALTH QUESTIONNAIRE - PHQ9
SUM OF ALL RESPONSES TO PHQ QUESTIONS 1-9: 0
SUM OF ALL RESPONSES TO PHQ9 QUESTIONS 1 & 2: 0
SUM OF ALL RESPONSES TO PHQ QUESTIONS 1-9: 0
1. LITTLE INTEREST OR PLEASURE IN DOING THINGS: 0
2. FEELING DOWN, DEPRESSED OR HOPELESS: 0

## 2022-07-15 NOTE — PROGRESS NOTES
[facility] PHYSICIAN SERVICES  [facility] FAMILY MEDICINE  [address] [facility] [address] [address]  Dept: [phone]  Dept Fax: [phone]  Loc: [phone]    Melanie Santos is a 74 y.o. male who presents today for his medical conditions/complaints as noted below. Melanie Satnos is here for Annual Exam and Arthritis        HPI:   CC: Here today to discuss the following:    Hypertension  Compliant with medications. No adverse effects from medication. No lightheadedness, palpitations, or chest pain. Gastroesophageal Reflux Disease  Symptoms currently under control. Medication adequately controls symptoms. No hematochezia or melena. Continues to have osteoarthritis of multiple joints. Takes meloxicam.  No gastrointestinal distress. Continues to see gastroenterology for history of hemochromatosis. HPI    Subjective:      Review of Systems  Review of Systems   Constitutional: Negative for chills and fever. HENT: Negative for congestion. Respiratory: Negative for cough, chest tightness and shortness of breath. Cardiovascular: Negative for chest pain, palpitations and leg swelling. Gastrointestinal: Negative for abdominal pain, anal bleeding, constipation, diarrhea and nausea. Genitourinary: Negative for difficulty urinating. Psychiatric/Behavioral: Negative. SeeHPI for visit specific review of symptoms. All others negative      Objective:   /78   Pulse 52   Temp 97.8 °F (36.6 °C)   Ht 5' 7\" (1.702 m)   Wt 205 lb (93 kg)   SpO2 97%   BMI 32.11 kg/m²   Physical Exam    Physical Exam   Constitutional: He appears well-developed. Does not appear ill. Neck: Neck supple. No masses. Neck Symmetric. Normal tracheal position. No thyroid enlargement  Cardiovascular: Normal rate and regular rhythm. Exam reveals no friction rub. No murmur heard. Respiratory:  Effort normal and breath sounds normal. No respiratory distress. No wheezes. No rales.  No use of accessory muscles or intercostal retractions. Neurological: alert. Psychiatric: normal mood and affect. His behavior is normal.     Recent Results (from the past 672 hour(s))   Lipid Panel    Collection Time: 07/07/22  8:40 AM   Result Value Ref Range    Cholesterol, Total 177 160 - 199 mg/dL    Triglycerides 216 (H) 0 - 149 mg/dL    HDL 36 (L) 55 - 121 mg/dL    LDL Calculated 98 <100 mg/dL   Comprehensive Metabolic Panel    Collection Time: 07/07/22  8:40 AM   Result Value Ref Range    Sodium 141 136 - 145 mmol/L    Potassium 4.1 3.5 - 5.0 mmol/L    Chloride 104 98 - 111 mmol/L    CO2 28 22 - 29 mmol/L    Anion Gap 9 7 - 19 mmol/L    Glucose 98 74 - 109 mg/dL    BUN 19 8 - 23 mg/dL    CREATININE 1.2 0.5 - 1.2 mg/dL    GFR Non-African American >60 >60    GFR African American >59 >59    Calcium 9.1 8.8 - 10.2 mg/dL    Total Protein 7.0 6.6 - 8.7 g/dL    Albumin 3.9 3.5 - 5.2 g/dL    Total Bilirubin 0.8 0.2 - 1.2 mg/dL    Alkaline Phosphatase 77 40 - 130 U/L    ALT 22 5 - 41 U/L    AST 22 5 - 40 U/L       Last endoscopy was May 4, 2022  Impression LA grade a reflux esophagitis  Erythematous mucosa in the antrum  Normal examined duodenum        Assessment & Plan: The following diagnoses and conditions are stable with no further orders unless indicated:  1. Essential (primary) hypertension  BP Readings from Last 3 Encounters:   07/15/22 128/78   01/13/22 118/72   11/02/21 128/79   Lisinopril 20 mg daily  Stable    - CBC with Auto Differential; Future  - Comprehensive Metabolic Panel; Future    2. Gastroesophageal reflux disease without esophagitis  Omeprazole 20 mg daily  Stable  3. Encounter for prostate cancer screening    - PSA Screening; Future    4. Lipid screening    - Lipid Panel; Future    5. Primary osteoarthritis involving multiple joints  Challenging situation as he has a history of liver disease, esophagitis, and chronic osteoarthritis.   Tylenol does not appear to be providing him any relief and higher doses of Tylenol may be detrimental to his liver issues. Tylenol containing hydrocodone and other opioid pain relievers likely be high risk as well considering his liver disease. Anti-inflammatories could cause increased risk of gastrointestinal hemorrhage. Discussed these concerns with the patient today. He states he will try to take the meloxicam sparingly. He did report he did not have to take it daily before. Is already on omeprazole 20 mg daily advised him to take this regularly. - meloxicam (MOBIC) 15 MG tablet; Take 1 tablet by mouth daily as needed for Pain  Dispense: 30 tablet; Refill: 5    6. Encounter for immunization  Third hepatitis B  Second hepatitis A  Will check hepatitis A and hepatitis B surface antibody  - pneumococcal 13-valent conjugate (PREVNAR) injection 0.5 mL          Return in about 6 months (around 1/15/2023) for AWV - 20 minute visit. 1 to 18 years  Two doses at least 6 months apart.    _______________________________________________________________    Julio Hands ® 1  Licensed dosages and schedules for HAVRIX ® 1   Age Dose (JEAN PIERRE units)2 Volume (mL) No. of doses Schedule (mos)3   12 mos-18 yrs 720 0.5 2 0,6-12   ?19 years 1,440 1.0 2 0,6-12   1Hepatitis A vaccine, inactivated, [employer]. 2Enzyme-linked immunosorbent assay units. 30 months represents timing of the initial dose; subsequent numbers represent months after the initial dose. VAQTA ® 1  Licensed dosages and schedules for VAQTA ® 1   Age Dose (U.)2 Volume (mL) No. of doses Schedule (mos)3   12 mos-18 yrs 25 0.5 2 0,6-18   ?19 years 50 1.0 2 0,6-18   1Hepatitis A vaccine, inactivated, [facility] [employer]  2Units. 30 months represents timing of the initial dose; subsequent numbers represent months after the initial dose.   TWINRIX ® 1 (HepAHepB) Vaccine Schedule (Not recommended for post exposure prophylaxis)  Licensed dosages and schedules for TWINRIX ® 1   Age Dose (JEAN PIERRE units)2 Volume (mL) No. of doses Schedule   ? 18 yrs 720 1.0 3 0, 1, 6 mos   ? 18 yrs 720 1.0 4 0, 7, 21-30 days + 12 mos3   1Combined hepatitis A and hepatitis B vaccine, inactivated, [employer]. 2Enzyme-linked immunosorbent assay units. 3This 4-dose schedule enables patients to receive 3 doses in 21 days; this schedule is used prior to planned exposure with short notice and requires a fourth dose at 12 months. Discussed use, benefit, and side effects of prescribed medications. All patient questions answered. Pt voiced understanding. Reviewed health maintenance. Instructedto continue current medications, diet and exercise. Patient agreed with treatmentplan.  Follow up as directed.     _______________________________________________________________      Past Medical History:   Diagnosis Date    Cirrhosis of liver without ascites ([facility] [address] 7/17/2021    Hypertension     Joint pain       Past Surgical History:   Procedure Laterality Date    TONSILLECTOMY         Family History   Problem Relation Age of Onset    Diabetes Mother     High Blood Pressure Mother     Heart Disease Father        Social History     Tobacco Use    Smoking status: Never    Smokeless tobacco: Never   Substance Use Topics    Alcohol use: Yes     Current Outpatient Medications   Medication Sig Dispense Refill    meloxicam (MOBIC) 15 MG tablet Take 1 tablet by mouth daily as needed for Pain 30 tablet 5    omeprazole (PRILOSEC) 20 MG delayed release capsule Take 1 capsule by mouth daily as needed (GERD) 30 capsule 11    sildenafil (VIAGRA) 100 MG tablet TAKE 1 TABLET BY MOUTH AS NEEDED FOR E.D. 3 tablet 4    lisinopril (PRINIVIL;ZESTRIL) 20 MG tablet TAKE 1 TABLET BY MOUTH ONCE DAILY 30 tablet 4    diclofenac sodium (VOLTAREN) 1 % GEL Apply 2 g topically 2 times daily 200 g 5    albuterol sulfate  (90 Base) MCG/ACT inhaler Inhale 2 puffs into the lungs every 6 hours as needed for Wheezing (Patient not taking: Reported on 7/15/2022) 1 Inhaler 0    aspirin 325 MG EC tablet Take 325 mg by mouth daily (Patient not taking: No sig reported)       No current facility-administered medications for this visit. Allergies   Allergen Reactions    Codeine        Health Maintenance   Topic Date Due    HIV screen  Never done    COVID-19 Vaccine (3 - Booster) 01/28/2022    Flu vaccine (1) 09/01/2022    Depression Screen  11/02/2022    Prostate Specific Antigen (PSA) Screening or Monitoring  01/07/2023    Colorectal Cancer Screen  02/24/2023    Pneumococcal 65+ years Vaccine (2 - PPSV23 or PCV20) 07/15/2023    Lipids  07/07/2027    DTaP/Tdap/Td vaccine (2 - Td or Tdap) 04/12/2028    Hepatitis A vaccine  Completed    Hepatitis B vaccine  Completed    Shingles vaccine  Completed    Hepatitis C screen  Completed    Hib vaccine  Aged Out    Meningococcal (ACWY) vaccine  Aged Out       _______________________________________________________________    Note dictated using Dragon Dictation software  Sometimes this dictation software makes erroneous transcriptions.

## 2022-07-15 NOTE — PROGRESS NOTES
[facility], located within [facility] PHYSICIAN SERVICES  [facility] FAMILY MEDICINE  [address] [facility] [address] [address]  Dept: [phone]  Dept Fax: [phone]  Loc: [phone]    Loretta Williamson is a 74 y.o. male who presents today for his medical conditions/complaints as noted below. Loretta Williamson is here for Annual Exam and Arthritis        HPI:   CC: Here today to discuss the following:    ***        HPI    Subjective:      Review of Systems      [facility] for visit specific review of symptoms.   All others negative      Objective:   /78   Pulse 52   Temp 97.8 °F (36.6 °C)   Ht 5' 7\" (1.702 m)   Wt 205 lb (93 kg)   SpO2 97%   BMI 32.11 kg/m²   Physical Exam      Recent Results (from the past 672 hour(s))   Lipid Panel    Collection Time: 07/07/22  8:40 AM   Result Value Ref Range    Cholesterol, Total 177 160 - 199 mg/dL    Triglycerides 216 (H) 0 - 149 mg/dL    HDL 36 (L) 55 - 121 mg/dL    LDL Calculated 98 <100 mg/dL   Comprehensive Metabolic Panel    Collection Time: 07/07/22  8:40 AM   Result Value Ref Range    Sodium 141 136 - 145 mmol/L    Potassium 4.1 3.5 - 5.0 mmol/L    Chloride 104 98 - 111 mmol/L    CO2 28 22 - 29 mmol/L    Anion Gap 9 7 - 19 mmol/L    Glucose 98 74 - 109 mg/dL    BUN 19 8 - 23 mg/dL    CREATININE 1.2 0.5 - 1.2 mg/dL    GFR Non-African American >60 >60    GFR African American >59 >59    Calcium 9.1 8.8 - 10.2 mg/dL    Total Protein 7.0 6.6 - 8.7 g/dL    Albumin 3.9 3.5 - 5.2 g/dL    Total Bilirubin 0.8 0.2 - 1.2 mg/dL    Alkaline Phosphatase 77 40 - 130 U/L    ALT 22 5 - 41 U/L    AST 22 5 - 40 U/L       Lab Results   Component Value Date    CHOL 177 07/07/2022    CHOL 214 (H) 01/07/2022    CHOL 164 10/18/2019     Lab Results   Component Value Date    TRIG 216 (H) 07/07/2022    TRIG 213 (H) 01/07/2022    TRIG 147 10/18/2019     Lab Results   Component Value Date    HDL 36 (L) 07/07/2022    HDL 47 (L) 01/07/2022    HDL 35 (L) 12/16/2020     Lab Results   Component Value Date aspirin 325 MG EC tablet Take 325 mg by mouth daily (Patient not taking: No sig reported)       No current facility-administered medications for this visit. Allergies   Allergen Reactions    Codeine        Health Maintenance   Topic Date Due    Pneumococcal 65+ years Vaccine (1 - PCV) Never done    HIV screen  Never done    COVID-19 Vaccine (3 - Booster) 01/28/2022    Hepatitis B vaccine (3 of 3 - Risk 3-dose series) 06/14/2022    Hepatitis A vaccine (2 of 2 - Risk 2-dose series) 07/13/2022    Flu vaccine (1) 09/01/2022    Depression Screen  11/02/2022    Prostate Specific Antigen (PSA) Screening or Monitoring  01/07/2023    Colorectal Cancer Screen  02/24/2023    Lipids  07/07/2027    DTaP/Tdap/Td vaccine (2 - Td or Tdap) 04/12/2028    Shingles vaccine  Completed    Hepatitis C screen  Completed    Hib vaccine  Aged Out    Meningococcal (ACWY) vaccine  Aged Out       _______________________________________________________________    Note dictated using Dragon Dictation software  Sometimes this dictation software makes erroneous transcriptions.

## 2022-07-15 NOTE — PATIENT INSTRUCTIONS
We are committed to providing you with the best care possible. In order to help us achieve these goals please remember to bring all medications, herbal products, and over the counter supplements with you to each visit. If your provider has ordered testing for you, please be sure to follow up with our office if you have not received results within 7 days after the testing took place. *If you receive a survey after visiting one of our offices, please take time to share your experience concerning your physician office visit. These surveys are confidential and no health information about you is shared. We are eager to improve for you and we are counting on your feedback to help make that happen. [FreeTextEntry1] : 44F recovering well s/p lap sleeve gastrectomy on 4/28/21.\par \par Preoperative weight 177 pounds, BMI 38\par Current weight 135 pounds, BMI 29

## 2022-12-07 ENCOUNTER — LAB (OUTPATIENT)
Dept: LAB | Facility: HOSPITAL | Age: 67
End: 2022-12-07

## 2022-12-07 ENCOUNTER — OFFICE VISIT (OUTPATIENT)
Dept: GASTROENTEROLOGY | Facility: CLINIC | Age: 67
End: 2022-12-07

## 2022-12-07 VITALS
SYSTOLIC BLOOD PRESSURE: 132 MMHG | OXYGEN SATURATION: 96 % | TEMPERATURE: 97.1 F | DIASTOLIC BLOOD PRESSURE: 74 MMHG | BODY MASS INDEX: 32.8 KG/M2 | HEIGHT: 67 IN | WEIGHT: 209 LBS | HEART RATE: 72 BPM

## 2022-12-07 DIAGNOSIS — Z83.71 FAMILY HISTORY OF POLYPS IN THE COLON: ICD-10-CM

## 2022-12-07 DIAGNOSIS — K74.69 OTHER CIRRHOSIS OF LIVER: Primary | ICD-10-CM

## 2022-12-07 DIAGNOSIS — D69.6 THROMBOCYTOPENIA: ICD-10-CM

## 2022-12-07 DIAGNOSIS — K31.A0 INTESTINAL METAPLASIA OF GASTRIC MUCOSA: ICD-10-CM

## 2022-12-07 DIAGNOSIS — E83.110 HEREDITARY HEMOCHROMATOSIS: ICD-10-CM

## 2022-12-07 DIAGNOSIS — K74.69 OTHER CIRRHOSIS OF LIVER: ICD-10-CM

## 2022-12-07 DIAGNOSIS — Z86.010 HISTORY OF ADENOMATOUS POLYP OF COLON: ICD-10-CM

## 2022-12-07 LAB
ALBUMIN SERPL-MCNC: 4.1 G/DL (ref 3.5–5.2)
ALBUMIN/GLOB SERPL: 1.2 G/DL
ALP SERPL-CCNC: 98 U/L (ref 39–117)
ALT SERPL W P-5'-P-CCNC: 31 U/L (ref 1–41)
ANION GAP SERPL CALCULATED.3IONS-SCNC: 9 MMOL/L (ref 5–15)
AST SERPL-CCNC: 28 U/L (ref 1–40)
BASOPHILS # BLD AUTO: 0.05 10*3/MM3 (ref 0–0.2)
BASOPHILS NFR BLD AUTO: 0.6 % (ref 0–1.5)
BILIRUB SERPL-MCNC: 0.7 MG/DL (ref 0–1.2)
BUN SERPL-MCNC: 15 MG/DL (ref 8–23)
BUN/CREAT SERPL: 12.6 (ref 7–25)
CALCIUM SPEC-SCNC: 8.8 MG/DL (ref 8.6–10.5)
CHLORIDE SERPL-SCNC: 106 MMOL/L (ref 98–107)
CO2 SERPL-SCNC: 27 MMOL/L (ref 22–29)
CREAT SERPL-MCNC: 1.19 MG/DL (ref 0.76–1.27)
DEPRECATED RDW RBC AUTO: 45.4 FL (ref 37–54)
EGFRCR SERPLBLD CKD-EPI 2021: 67.8 ML/MIN/1.73
EOSINOPHIL # BLD AUTO: 0.21 10*3/MM3 (ref 0–0.4)
EOSINOPHIL NFR BLD AUTO: 2.5 % (ref 0.3–6.2)
ERYTHROCYTE [DISTWIDTH] IN BLOOD BY AUTOMATED COUNT: 12.9 % (ref 12.3–15.4)
GLOBULIN UR ELPH-MCNC: 3.3 GM/DL
GLUCOSE SERPL-MCNC: 117 MG/DL (ref 65–99)
HCT VFR BLD AUTO: 47.5 % (ref 37.5–51)
HGB BLD-MCNC: 15.8 G/DL (ref 13–17.7)
IMM GRANULOCYTES # BLD AUTO: 0.09 10*3/MM3 (ref 0–0.05)
IMM GRANULOCYTES NFR BLD AUTO: 1.1 % (ref 0–0.5)
INR PPP: 0.97 (ref 0.91–1.09)
LYMPHOCYTES # BLD AUTO: 4.16 10*3/MM3 (ref 0.7–3.1)
LYMPHOCYTES NFR BLD AUTO: 48.6 % (ref 19.6–45.3)
MCH RBC QN AUTO: 31.7 PG (ref 26.6–33)
MCHC RBC AUTO-ENTMCNC: 33.3 G/DL (ref 31.5–35.7)
MCV RBC AUTO: 95.2 FL (ref 79–97)
MONOCYTES # BLD AUTO: 0.76 10*3/MM3 (ref 0.1–0.9)
MONOCYTES NFR BLD AUTO: 8.9 % (ref 5–12)
NEUTROPHILS NFR BLD AUTO: 3.29 10*3/MM3 (ref 1.7–7)
NEUTROPHILS NFR BLD AUTO: 38.3 % (ref 42.7–76)
NRBC BLD AUTO-RTO: 0 /100 WBC (ref 0–0.2)
PLATELET # BLD AUTO: 142 10*3/MM3 (ref 140–450)
PMV BLD AUTO: 11.9 FL (ref 6–12)
POTASSIUM SERPL-SCNC: 4.1 MMOL/L (ref 3.5–5.2)
PROT SERPL-MCNC: 7.4 G/DL (ref 6–8.5)
PROTHROMBIN TIME: 13 SECONDS (ref 11.8–14.8)
RBC # BLD AUTO: 4.99 10*6/MM3 (ref 4.14–5.8)
SODIUM SERPL-SCNC: 142 MMOL/L (ref 136–145)
WBC NRBC COR # BLD: 8.56 10*3/MM3 (ref 3.4–10.8)

## 2022-12-07 PROCEDURE — 82105 ALPHA-FETOPROTEIN SERUM: CPT

## 2022-12-07 PROCEDURE — 99214 OFFICE O/P EST MOD 30 MIN: CPT | Performed by: INTERNAL MEDICINE

## 2022-12-07 PROCEDURE — 86708 HEPATITIS A ANTIBODY: CPT

## 2022-12-07 PROCEDURE — 86317 IMMUNOASSAY INFECTIOUS AGENT: CPT

## 2022-12-07 PROCEDURE — 82306 VITAMIN D 25 HYDROXY: CPT

## 2022-12-07 PROCEDURE — 85610 PROTHROMBIN TIME: CPT

## 2022-12-07 PROCEDURE — 82728 ASSAY OF FERRITIN: CPT

## 2022-12-07 PROCEDURE — 85025 COMPLETE CBC W/AUTO DIFF WBC: CPT

## 2022-12-07 PROCEDURE — 80053 COMPREHEN METABOLIC PANEL: CPT

## 2022-12-07 PROCEDURE — 36415 COLL VENOUS BLD VENIPUNCTURE: CPT

## 2022-12-07 RX ORDER — MELOXICAM 15 MG/1
1 TABLET ORAL DAILY
COMMUNITY
Start: 2022-10-31

## 2022-12-07 NOTE — PROGRESS NOTES
Primary Physician: Ermias Monteiro MD    Chief Complaint   Patient presents with   • Follow-up     Pt presents today for cirrhosis follow up-pt states he is feeling good        Subjective     Frankie Reid is a 67 y.o. male.    HPI   Genetic hemochromatosis-now progressing to cirrhosis which is new to address today  Patient had CTA at [facility] 4/5/2021 for other reasons which showed nodular changes of the liver concerning for cirrhosis.  LFTs have been normal.  Laboratory panel through this office shows high ferritin in the 700 range.  He is homozygous for H63D mutation.  No family history of liver disease.   He was drinking 1-2 cases of beer a week or 1/5th whiskey a week.  He stopped alcohol on April 4, 2021, but he has resumed a beer about 2x/week.  In 12/2022 about a beer a day.     Sonogram of the liver with elastography 6/2021 shows F3/F4 status.  No masses noted.  Endoscopy 2021 does not show varices.  Ferritin in correct range starting 9/2021 after a series of phlebotomy.    Was on every 3 month cycle in 2021 and this was changed to every 6 months in 2022.  He is due for phleb and will call after the holidays.     Gastric intestinal metaplasia with h/o gastric ulcers  Endoscopy was done in September 2021 to follow-up on gastric ulcer previously been seen 7/2021.  Biopsies from the antrum showed gastric intestinal metaplasia.  H. pylori biopsy was negative.  He had previously had H pylori in July which was treated and then found to be cured.  Metaplasia gastric mapping via biopsy done 5/2022.  No neoplasia noted.  Plan to repeat endoscopy 5/2025.     Adenomatous colon polyps and family hx colon polyps  Adenomas were removed in 2012 and 2017.  Last colonoscopy done 2/2020 and unremarkable.  He will need a repeat colonoscopy in 2/2025.  His sister also has had colon polyps less than 60 years old.      Past Medical History:   Diagnosis Date   • Arthritis    • Diverticulosis    • ED (erectile  dysfunction)    • Family history of colonic polyps    • GERD (gastroesophageal reflux disease)    • Hereditary hemochromatosis (HCC)    • History of adenomatous polyp of colon    • History of colon polyps    • Hypertension        Past Surgical History:   Procedure Laterality Date   • COLONOSCOPY N/A 02/10/2017    One 7mm sessile serated adenomatous polyp in the ascending colon; Two 5-7mm adenomatous polyps in the transverse colon; One 4mm hyperplastic polyp in the rectum; Repeat 3 years   • COLONOSCOPY  10/25/2012    One 6mm adenomatous polyp in the sigmoid colon; Repeat 4 years   • COLONOSCOPY N/A 02/24/2020    Diverticulosis in the left colon; The examination was otherwise normal on direct and retroflexion views; No specimens collected; Repeat 5 years   • ENDOSCOPY N/A 07/19/2021    Non-bleeding gastric ulcer with no stigmata of bleeding-biopsied; Non-bleeding erosive gastropathy; Normal examined duodenum; Repeat 2 months   • ENDOSCOPY N/A 09/20/2021    Small HH; Erythematous mucosa in the antrum-biopsied; Normal examined duodenum   • ENDOSCOPY N/A 05/04/2022    LA Grade A reflux esophagitis; Erythematous mucosa in the antrum-biopsied; Normal examined duodenum   • MULTIPLE TOOTH EXTRACTIONS     • TONSILLECTOMY          Current Outpatient Medications:   •  lisinopril (PRINIVIL,ZESTRIL) 20 MG tablet, Take 20 mg by mouth Daily., Disp: , Rfl:   •  meloxicam (MOBIC) 15 MG tablet, Take 1 tablet by mouth Daily., Disp: , Rfl:   •  pantoprazole (PROTONIX) 40 MG EC tablet, Take 1 tablet by mouth Daily., Disp: 30 tablet, Rfl: 11    Allergies   Allergen Reactions   • Codeine Nausea And Vomiting       Social History     Socioeconomic History   • Marital status:    Tobacco Use   • Smoking status: Never   • Smokeless tobacco: Never   Vaping Use   • Vaping Use: Never used   Substance and Sexual Activity   • Alcohol use: Not Currently   • Drug use: No   • Sexual activity: Defer       Family History   Problem Relation Age  "of Onset   • Colon polyps Sister         < 60 years of age   • No Known Problems Mother    • Heart disease Father    • Colon cancer Neg Hx    • Esophageal cancer Neg Hx    • Liver cancer Neg Hx    • Liver disease Neg Hx    • Rectal cancer Neg Hx    • Stomach cancer Neg Hx        Review of Systems   Respiratory: Negative for shortness of breath.    Cardiovascular: Negative for chest pain.       Objective     /74 (BP Location: Left arm, Patient Position: Sitting, Cuff Size: Adult)   Pulse 72   Temp 97.1 °F (36.2 °C) (Infrared)   Ht 170.2 cm (67\")   Wt 94.8 kg (209 lb)   SpO2 96%   BMI 32.73 kg/m²     Physical Exam    Lab Results - Last 18 Months   Lab Units 07/07/22  0840 07/06/22  0759 01/07/22  1040   GLUCOSE mg/dL 98 108* 95   BUN mg/dL 19 22 18   CREATININE mg/dL 1.2 1.24 1.3*   SODIUM mmol/L 141 140 142   POTASSIUM mmol/L 4.1 3.8 4.1   CHLORIDE mmol/L 104 106 102   TOTAL CO2 mmol/L 28  --  27   CO2 mmol/L  --  25.0  --    TOTAL PROTEIN g/dL 7.0 7.0 7.5   ALBUMIN g/dL 3.9 4.00 4.3   ALT (SGPT) U/L 22 21 35   AST (SGOT) U/L 22 21 31   ALK PHOS U/L 77 79 97   BILIRUBIN mg/dL 0.8 0.9 0.8   GLOBULIN gm/dL  --  3.0  --    ALPHA-FETOPROTEIN ng/mL  --  3  --        Lab Results - Last 18 Months   Lab Units 07/06/22  0759 04/14/22  0713 01/07/22  1040 01/03/22  0757 09/29/21  0808 09/01/21  0803   HEMOGLOBIN g/dL 16.2 15.8 14.7 15.9 14.4 14.6   HEMATOCRIT % 46.8 47.3 46.3 48.3 43.7 42.4   MCV fL 94.0 93.3 94.7* 91.5 97.8* 96.1   WBC 10*3/mm3 8.06 7.48 7.9 7.45 6.99 7.51   RDW % 12.9 13.1 14.2 14.2 12.2* 12.4   MPV fL 12.5* 12.3* 12.2 11.8 11.7 12.0   PLATELETS 10*3/mm3 121* 124* 155 131* 135* 127*       Lab Results - Last 18 Months   Lab Units 07/06/22  0759 04/14/22  0713 01/07/22  1040 01/03/22  0757 09/29/21  0808 09/01/21  0803 08/18/21  0804   FERRITIN ng/mL 93.93 36.16  --  57.29 87.56 233.00 308.40   TSH uIU/mL  --   --  1.080  --   --   --   --         Lab Results - Last 18 Months   Lab Units " 07/06/22  0759   ALPHA-FETOPROTEIN ng/mL 3   FERRITIN ng/mL 93.93       US LIVER- 7/7/2022 7:57 AM CDT     REASON FOR EXAM: hemochromatosis; E83.110-Hereditary hemochromatosis       COMPARISON: Ultrasound dated 6/1/2021      TECHNIQUE: Multiple longitudinal and transverse realtime sonographic  images of the right upper quadrant of the abdomen are obtained.      FINDINGS:    Pancreas: Included portions of the pancreatic head and neck appear  somewhat echogenic. The body and tail segments are obscured from view.      Liver: Liver is echogenic and diffusely coarse in texture. Liver surface  contour is nodular and there is left segment hypertrophy noted. Hepatic  vein compression. Portal veins are patent with hepatopedal flow. No  focal lesion is seen.     Gallbladder: No intraluminal echoes, wall thickening, or pericholecystic  fluid.      Bile ducts: The CBD measures 0.4 cm in diameter. There is no  intrahepatic or extrahepatic ductal dilation.      Other: Right renal cortical thinning. No hydronephrosis.      IMPRESSION:  1. Liver cirrhosis, appearing diffusely heterogeneous and with nodular  surface contour. No focal lesion identified.  2. Portal veins are patent with hepatopedal flow.  3. Gallbladder and biliary tree are unremarkable.  4. Visualized portions of the pancreatic head and neck are somewhat  echogenic, which may reflect fatty infiltration.  This report was finalized on 07/07/2022 13:49 by Dr Govind Carlisle, .    IMPRESSION/PLAN:    Assessment & Plan      Problem List Items Addressed This Visit        Coag and Thromboembolic    Thrombocytopenia (HCC)    Overview     Likely related to advancing liver disease.         Relevant Orders    CBC Auto Differential       Endocrine and Metabolic    Hereditary hemochromatosis (HCC)    Overview     Homozygous for H63D.  Ferritin 700 range originally and corrected with phlebotomy. Pt aware that hereditary hemochromatosis is an independent risk factor for  hepatocellular carcinoma.  He will need laboratories and serial sonograms of liver done on a regular basis.    Ferritin in correct range starting 9/2021 after a series of phlebotomy.  Was on every 3 month cycle in 2021.  Now on every 6 month cycle 2022 and ferritins are holding in acceptable range.         Relevant Orders    Ferritin       Family History    Family history of polyps in the colon    Overview     Sister had colon polyps less than 60 years old.            Gastrointestinal Abdominal     History of adenomatous polyp of colon    Overview     Adenomatous colon polyps were removed in 2012 and 2017.  Last colonoscopy 2/2020 unremarkable.  He will be due for repeat colonoscopy in 2/2025.         Other cirrhosis of liver (HCC) - Primary    Overview     New problem to address today.  CTA of the chest shows nodular liver suggestive of cirrhosis.  Sono imaging 7/2022 suggestive of cirrhosis.  Fib 4 score 7/22 is 2.46 which is nondiagnostic for advanced fibrosis.    LFTs are normal.  Full serological evaluation unremarkable with exception of a ferritin in the 700 range with increased iron saturation. Homozygous for H63D mutation.  Fib 4 score is 2 which is nondiagnostic for advanced fibrosis. Elastography = F3.    No varices on endoscopy 2021 and again in 2022.    Coffee consumption has been shown to decrease the risk of HCC in patients with chronic liver disease.  In these patients, coffee consumption should be encouraged.    The patient was advised to stay active as possible and exercise as tolerated on a regular basis.  This is to pope off muscle wasting.  Chronic liver disease is a catabolic condition that will likely result in loss of muscle mass. Sarcopenia is associated with 2.2 fold increased risk of death and is more predictive of death than well-accepted indicaors of liver function like MELD and CPT.    The best way of slowing this process is to exercise regularly as much as possible as well as to ingest  a moderate amount of dietary protein, at least 1.0-1.5mg/kg.  The patient should have at least 3 servings of protein daily.  Daily caloric intake should be 25-40kcal/kg/day.    These patients are also at risk for osteoporosis and vitamin D deficiency.  Sarcopenia worsens bone formation.  If evidence of osteoporosis, then treatment with biphosphonates and calcium are safe and recommended.    Fluid restriction should only take place if there is significant hyponatremia.    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    Pt. also advised to take multivitamin daily.    Vaccines are important as well:    · All adults need yearly vaccines against influenza    · Twinrix vaccination completed--will check immunity    · All patients should receive pneumococcal vaccines--both the PCV13 and the PPSV23.  Pneumococcus PCV 13 (1 dose only, 12 months apart from PPSV 23),  and Pneumococcus PPSV 23 (every 5 years, for 2 doses and third dose at age 65)    These vaccines can be obtained through the patients PCP or alternatively, they can be given at [facility] without a prescription.         Relevant Orders    AFP Tumor Marker    Comprehensive Metabolic Panel    Protime-INR    US Liver    Vitamin D,25-Hydroxy    Hepatitis A Antibody, Total    Hepatitis B Surf Antibody Quant    Intestinal metaplasia of gastric mucosa    Overview     Found on endoscopy 2021.  Mapping done 5/2022 and intestinal metaplasia found involving the body of the stomach.  The antrum was spared.  H pylori has already been eradicated.  Will need repeat endoscopy 5/2025.            MEDICAL COMPLEXITY must have 2 out of 3   Moderate Complexity Level 4                                                                                                              1 of the following medical problems:                                                                                               []One chronic illness with mild exacerbation                                                                                 [x]Two or more stable chronic illness                                                                                              [x]One new problem  []One acute illness with systemic symptoms     Complexity of Data  Reviewed (1 out of the 3 following categories)                                             Category 1 tests, documents, historian (must have 3 points)                                                      []Review of prior external records  [x]Review of results of unique tests  [x]Ordering unique tests   []Assessment requires an independent historian   Category 2 Interpretation of tests     []Independent interpretation of test read by another doc   Category 3 Discuss Management/tests  []Discussion with external physician     Risk of complications and/or morbidity                                                                                           [x]Prescription Drug Management     []Decision for elective endoscopic procedure              RTC 6 months      Dorothea Sales MD  12/07/22  14:56 CST    Part of this note may be an electronic transcription/translation of spoken language to printed text.

## 2022-12-08 LAB
25(OH)D3 SERPL-MCNC: 29.9 NG/ML (ref 30–100)
ALPHA-FETOPROTEIN: 2.83 NG/ML (ref 0–8.3)
FERRITIN SERPL-MCNC: 142 NG/ML (ref 30–400)
HAV AB SER QL IA: POSITIVE
HBV SURFACE AB SER-ACNC: <3.1 MIU/ML

## 2022-12-19 ENCOUNTER — TELEPHONE (OUTPATIENT)
Dept: GASTROENTEROLOGY | Facility: CLINIC | Age: 67
End: 2022-12-19

## 2022-12-19 PROBLEM — Z78.9 NONIMMUNE TO HEPATITIS B VIRUS: Status: ACTIVE | Noted: 2022-12-19

## 2022-12-19 PROBLEM — E55.9 VITAMIN D INSUFFICIENCY: Status: ACTIVE | Noted: 2022-12-19

## 2022-12-19 PROBLEM — Z78.9 HEPATITIS A IMMUNE: Status: ACTIVE | Noted: 2022-12-19

## 2023-01-04 ENCOUNTER — HOSPITAL ENCOUNTER (OUTPATIENT)
Dept: ULTRASOUND IMAGING | Facility: HOSPITAL | Age: 68
Discharge: HOME | End: 2023-01-04
Admitting: INTERNAL MEDICINE
Payer: COMMERCIAL

## 2023-01-04 DIAGNOSIS — K74.69 OTHER CIRRHOSIS OF LIVER: ICD-10-CM

## 2023-01-04 PROCEDURE — 76705 ECHO EXAM OF ABDOMEN: CPT

## 2023-01-16 ENCOUNTER — OFFICE VISIT (OUTPATIENT)
Dept: PRIMARY CARE CLINIC | Age: 68
End: 2023-01-16

## 2023-01-16 VITALS
HEART RATE: 102 BPM | TEMPERATURE: 97.8 F | BODY MASS INDEX: 32.93 KG/M2 | HEIGHT: 67 IN | SYSTOLIC BLOOD PRESSURE: 148 MMHG | WEIGHT: 209.8 LBS | OXYGEN SATURATION: 96 % | DIASTOLIC BLOOD PRESSURE: 80 MMHG

## 2023-01-16 DIAGNOSIS — G89.29 CHRONIC PAIN OF BOTH KNEES: ICD-10-CM

## 2023-01-16 DIAGNOSIS — M25.562 CHRONIC PAIN OF BOTH KNEES: ICD-10-CM

## 2023-01-16 DIAGNOSIS — M25.561 CHRONIC PAIN OF BOTH KNEES: ICD-10-CM

## 2023-01-16 DIAGNOSIS — Z13.220 LIPID SCREENING: ICD-10-CM

## 2023-01-16 DIAGNOSIS — I10 ESSENTIAL (PRIMARY) HYPERTENSION: ICD-10-CM

## 2023-01-16 DIAGNOSIS — I10 PRIMARY HYPERTENSION: Primary | ICD-10-CM

## 2023-01-16 DIAGNOSIS — M15.9 PRIMARY OSTEOARTHRITIS INVOLVING MULTIPLE JOINTS: ICD-10-CM

## 2023-01-16 DIAGNOSIS — K74.60 CIRRHOSIS OF LIVER WITHOUT ASCITES, UNSPECIFIED HEPATIC CIRRHOSIS TYPE (HCC): ICD-10-CM

## 2023-01-16 DIAGNOSIS — E55.9 AVITAMINOSIS D: ICD-10-CM

## 2023-01-16 DIAGNOSIS — K21.9 GASTROESOPHAGEAL REFLUX DISEASE WITHOUT ESOPHAGITIS: ICD-10-CM

## 2023-01-16 DIAGNOSIS — E83.110 HEREDITARY HEMOCHROMATOSIS (HCC): ICD-10-CM

## 2023-01-16 DIAGNOSIS — R73.9 HYPERGLYCEMIA: ICD-10-CM

## 2023-01-16 DIAGNOSIS — Z12.5 ENCOUNTER FOR PROSTATE CANCER SCREENING: ICD-10-CM

## 2023-01-16 PROBLEM — M15.0 PRIMARY OSTEOARTHRITIS INVOLVING MULTIPLE JOINTS: Status: ACTIVE | Noted: 2023-01-16

## 2023-01-16 RX ORDER — LISINOPRIL 20 MG/1
TABLET ORAL
Qty: 30 TABLET | Refills: 4 | Status: SHIPPED | OUTPATIENT
Start: 2023-01-16

## 2023-01-16 RX ORDER — MELOXICAM 15 MG/1
15 TABLET ORAL DAILY PRN
Qty: 30 TABLET | Refills: 5 | Status: SHIPPED | OUTPATIENT
Start: 2023-01-16

## 2023-01-16 RX ORDER — OMEPRAZOLE 20 MG/1
20 CAPSULE, DELAYED RELEASE ORAL DAILY PRN
Qty: 30 CAPSULE | Refills: 11 | Status: SHIPPED | OUTPATIENT
Start: 2023-01-16

## 2023-01-16 SDOH — ECONOMIC STABILITY: FOOD INSECURITY: WITHIN THE PAST 12 MONTHS, THE FOOD YOU BOUGHT JUST DIDN'T LAST AND YOU DIDN'T HAVE MONEY TO GET MORE.: NEVER TRUE

## 2023-01-16 SDOH — ECONOMIC STABILITY: FOOD INSECURITY: WITHIN THE PAST 12 MONTHS, YOU WORRIED THAT YOUR FOOD WOULD RUN OUT BEFORE YOU GOT MONEY TO BUY MORE.: NEVER TRUE

## 2023-01-16 ASSESSMENT — SOCIAL DETERMINANTS OF HEALTH (SDOH): HOW HARD IS IT FOR YOU TO PAY FOR THE VERY BASICS LIKE FOOD, HOUSING, MEDICAL CARE, AND HEATING?: NOT HARD AT ALL

## 2023-01-16 ASSESSMENT — PATIENT HEALTH QUESTIONNAIRE - PHQ9
SUM OF ALL RESPONSES TO PHQ9 QUESTIONS 1 & 2: 0
2. FEELING DOWN, DEPRESSED OR HOPELESS: 0
SUM OF ALL RESPONSES TO PHQ QUESTIONS 1-9: 0
1. LITTLE INTEREST OR PLEASURE IN DOING THINGS: 0
SUM OF ALL RESPONSES TO PHQ QUESTIONS 1-9: 0

## 2023-01-16 NOTE — PATIENT INSTRUCTIONS
Labs before next appointment:  Go to room 405 for labs prior to next visit. Be sure to fast for at least 10 hours prior to laboratory appointment. Would recommend getting labs about 1 week prior to the appointment time to ensure they are available at the time of the appointment. No appointment is necessary for laboratory work as the orders have already been placed.     Lab opens at 6:30 am and closes at 4:30 pm.

## 2023-01-16 NOTE — PROGRESS NOTES
[address] [facility]  [address] [address] [id number] [place] [address]  Dept: [phone]  Dept Fax: [phone]  Loc: [phone]    Bruce Gonzales is a 74 y.o. male who presents today for his medical conditions/complaints as noted below. Bruce Gonzales is here for Annual Exam        HPI:   CC: Here today to discuss the following:    He has a history of hypertension. He has not been taking his blood pressure medication over the past 2 days.  -States he has been [employer] work of the past 2 days and is simply forgot to take his medication. He has a history of bilateral knee pain. Continues to take Mobic. Overall satisfied    He has a history of hemochromatosis with cirrhosis observed on ultrasound of liver. Continues to be followed by gastroenterology at [facility]. Gastroesophageal Reflux Disease  Symptoms currently under control. Medication adequately controls symptoms. No hematochezia or melena. HPI    Subjective:      Review of Systems  Review of Systems   Constitutional: Negative for chills and fever. HENT: Negative for congestion. Respiratory: Negative for cough, chest tightness and shortness of breath. Cardiovascular: Negative for chest pain, palpitations and leg swelling. Gastrointestinal: Negative for abdominal pain, anal bleeding, constipation, diarrhea and nausea. SeeHPI for visit specific review of symptoms. All others negative      Objective:   BP (!) 148/80   Pulse (!) 102   Temp 97.8 °F (36.6 °C) (Temporal)   Ht 5' 7\" (1.702 m)   Wt 209 lb 12.8 oz (95.2 kg)   SpO2 96%   BMI 32.86 kg/m²   Physical Exam  Physical Exam   Constitutional: He appears well. Does not appear ill. Neck: Neck supple. No masses. Neck Symmetric. Normal tracheal position. No thyroid enlargement  Cardiovascular: Normal rate and regular rhythm. Exam reveals no friction rub. No murmur heard.   Respiratory:  Effort normal and breath sounds normal. No respiratory distress. No wheezes. No rales. No use of accessory muscles or intercostal retractions. Neurological: alert. Psychiatric: normal mood and affect. His behavior is normal.       No results found for this or any previous visit (from the past 672 hour(s)). Assessment & Plan: The following diagnoses and conditions are stable with no further orders unless indicated:  1. Primary hypertension  Current medication:  Lisinopril 20 mg daily    BP Readings from Last 3 Encounters:   01/16/23 (!) 148/80   07/15/22 128/78   01/13/22 118/72         2. Hereditary hemochromatosis ([facility] [address]  Lab Results   Component Value Date    ALT 22 07/07/2022    AST 22 07/07/2022    ALKPHOS 77 07/07/2022    BILITOT 0.8 07/07/2022         3. Cirrhosis of liver without ascites, unspecified hepatic cirrhosis type [facility])  -Most recent ultrasound January 2023: Cirrhosis. No suspicious focal liver lesions identified. He received his hepatitis A vaccine: Antibodies positive  He received his hepatitis B vaccine on January 13, February 14, and July 15, 2022: Hepatitis B surface antigen is negative. - -Suggest repeat in the series and rechecking antibodies 4 to 12 weeks later    4. Gastroesophageal reflux disease without esophagitis  Omeprazole 20 mg daily    5. Primary osteoarthritis involving multiple joints  Meloxicam  Stable  Lab Results   Component Value Date    CREATININE 1.2 07/07/2022     Lab Results   Component Value Date    BUN 19 07/07/2022     6. Avitaminosis D  Suggest he take over-the-counter vitamin D we will recheck next visit. 7.  Hyperglycemia recheck glucose and A1c next visit.   Discussed dietary changes          Orders Placed This Encounter   Procedures    PSA Screening     Standing Status:   Future     Standing Expiration Date:   1/16/2024    Comprehensive Metabolic Panel     Standing Status:   Future     Standing Expiration Date:   1/16/2024    Lipid Panel     Standing Status:   Future     Standing Expiration Date:   1/16/2024     Order Specific Question:   Is Patient Fasting?/# of Hours     Answer:   12    Vitamin D 25 Hydroxy     Standing Status:   Future     Standing Expiration Date:   1/16/2024    CBC with Auto Differential     Standing Status:   Future     Standing Expiration Date:   1/16/2024    Hemoglobin A1C     Standing Status:   Future     Standing Expiration Date:   1/16/2024           Return in about 6 months (around 7/16/2023) for Routine follow up - 20 minutes.             Discussed use, benefit, and side effects of prescribed medications.  All patient questions answered.  Pt voiced understanding. Reviewed health maintenance.  Instructedto continue current medications, diet and exercise.  Patient agreed with treatmentplan. Follow up as directed.     _______________________________________________________________      Past Medical History:   Diagnosis Date    Cirrhosis of liver without ascites (HCC) 7/17/2021    Hypertension     Joint pain     Primary osteoarthritis involving multiple joints 1/16/2023      Past Surgical History:   Procedure Laterality Date    TONSILLECTOMY         Family History   Problem Relation Age of Onset    Diabetes Mother     High Blood Pressure Mother     Heart Disease Father        Social History     Tobacco Use    Smoking status: Never    Smokeless tobacco: Never   Substance Use Topics    Alcohol use: Yes     Current Outpatient Medications   Medication Sig Dispense Refill    meloxicam (MOBIC) 15 MG tablet Take 1 tablet by mouth daily as needed for Pain 30 tablet 5    lisinopril (PRINIVIL;ZESTRIL) 20 MG tablet TAKE 1 TABLET BY MOUTH ONCE DAILY 30 tablet 4    omeprazole (PRILOSEC) 20 MG delayed release capsule Take 1 capsule by mouth daily as needed (GERD) 30 capsule 11    sildenafil (VIAGRA) 100 MG tablet TAKE 1 TABLET BY MOUTH AS NEEDED FOR E.D. 3 tablet 4    diclofenac sodium (VOLTAREN) 1 % GEL Apply 2 g topically 2 times daily 200 g 5    albuterol sulfate  (90  Base) MCG/ACT inhaler Inhale 2 puffs into the lungs every 6 hours as needed for Wheezing (Patient not taking: No sig reported) 1 Inhaler 0    aspirin 325 MG EC tablet Take 325 mg by mouth daily (Patient not taking: No sig reported)       No current facility-administered medications for this visit. Allergies   Allergen Reactions    Codeine        Health Maintenance   Topic Date Due    HIV screen  Never done    COVID-19 Vaccine (3 - Booster) 10/23/2021    Flu vaccine (1) 08/01/2022    Colorectal Cancer Screen  02/24/2023    Depression Screen  07/15/2023    Pneumococcal 65+ years Vaccine (2 - PPSV23 if available, else PCV20) 07/15/2023    Lipids  07/07/2027    DTaP/Tdap/Td vaccine (2 - Td or Tdap) 04/12/2028    Hepatitis A vaccine  Completed    Hepatitis B vaccine  Completed    Shingles vaccine  Completed    Hepatitis C screen  Completed    Hib vaccine  Aged Out    Meningococcal (ACWY) vaccine  Aged Out       _______________________________________________________________    Note dictated using Dragon Dictation software  Sometimes this dictation software makes erroneous transcriptions.

## 2023-01-17 ENCOUNTER — TELEPHONE (OUTPATIENT)
Dept: PRIMARY CARE CLINIC | Age: 68
End: 2023-01-17

## 2023-01-17 NOTE — TELEPHONE ENCOUNTER
----- Message from Tej Prado MD sent at 1/16/2023  3:10 PM CST -----  Regarding: Hepatitis B vaccine  He has completed the 3 dose series of the hepatitis B vaccine and his antibodies are negative. I would recommend repeating the same vaccine series again of the hepatitis B vaccine.   We will get a repeat his laboratory work afterwards to monitor conversion

## 2023-02-07 ENCOUNTER — LAB (OUTPATIENT)
Dept: LAB | Facility: HOSPITAL | Age: 68
End: 2023-02-07
Payer: COMMERCIAL

## 2023-02-07 DIAGNOSIS — E83.110 HEREDITARY HEMOCHROMATOSIS: Primary | ICD-10-CM

## 2023-02-07 LAB
DEPRECATED RDW RBC AUTO: 45.1 FL (ref 37–54)
ERYTHROCYTE [DISTWIDTH] IN BLOOD BY AUTOMATED COUNT: 12.5 % (ref 12.3–15.4)
FERRITIN SERPL-MCNC: 113.4 NG/ML (ref 30–400)
HCT VFR BLD AUTO: 48.6 % (ref 37.5–51)
HGB BLD-MCNC: 16.2 G/DL (ref 13–17.7)
Lab: NORMAL
MCH RBC QN AUTO: 32.6 PG (ref 26.6–33)
MCHC RBC AUTO-ENTMCNC: 33.3 G/DL (ref 31.5–35.7)
MCV RBC AUTO: 97.8 FL (ref 79–97)
PLATELET # BLD AUTO: 118 10*3/MM3 (ref 140–450)
PMV BLD AUTO: 12 FL (ref 6–12)
POST-BLOOD PRESSURE: NORMAL MMHG
PRE-BLOOD PRESSURE: NORMAL MMHG
PRE-HCT: 48.6 %
PRE-HGB: 16.2 G/DL
PULSE: 55 BPM
RBC # BLD AUTO: 4.97 10*6/MM3 (ref 4.14–5.8)
VOLUME COLLECTED: 500 ML
WBC NRBC COR # BLD: 8.56 10*3/MM3 (ref 3.4–10.8)

## 2023-02-07 PROCEDURE — 99195 PHLEBOTOMY: CPT

## 2023-02-07 PROCEDURE — 36415 COLL VENOUS BLD VENIPUNCTURE: CPT

## 2023-02-07 PROCEDURE — 85027 COMPLETE CBC AUTOMATED: CPT

## 2023-02-07 PROCEDURE — 82728 ASSAY OF FERRITIN: CPT

## 2023-06-05 NOTE — PROGRESS NOTES
Primary Physician: Ermias Monteiro MD    Chief Complaint   Patient presents with    Follow-up     Pt presents today for cirrhosis and hereditary hemochromatosis follow up-pt states he is feeling good        Subjective     Frankie Reid is a 68 y.o. male.  Last seen in the office December 7, 2022.    HPI  Genetic hemochromatosis with progression to cirrhosis  Patient had CTA at [facility] 4/5/2021 which showed nodular changes of the liver concerning for cirrhosis.  LFTs have been normal.  Ferritin in the 700 range. He is homozygous for H63D mutation.  No family history of liver disease.  He was drinking 1-2 cases of beer a week or 1/5th whiskey a week.  He stopped alcohol on April 4, 2021, but he has resumed a beer about 2x/week.  In 12/2022 about a beer a day.  In 6/2023 he is drinking about 3 beers/week.     Sonogram of the liver with elastography 6/2021 shows F3/F4 status.  No masses.  Endoscopy 2021 does not show varices.  Ferritin in correct range starting 9/2021 after a series of phlebotomy.    Was on every 3 month cycle in 2021 and this was changed to every 6 months in 2022.  Last phleb 2/2023.  Hasn't yet been vaccinated for Hep B.    Vitamin D insufficiency-new since last visit  Vitamin D level just below normal 12/2022.  He is taking vitamin D 5000 units daily.     Gastric intestinal metaplasia with h/o gastric ulcers  Endoscopy 9/2021 to follow-up on gastric ulcer from 7/2021 showed antral gastric intestinal metaplasia.  He had previously had H pylori in July which was treated and then found to be cured.  Metaplasia gastric mapping via biopsy done 5/2022.  No neoplasia noted.  Plan to repeat endoscopy 5/2025.     Adenomatous colon polyps and family hx colon polyps  Adenomas were removed in 2012 and 2017.  No adenomas 2/2020.  He will need a repeat colonoscopy in 2/2025.  His sister also has had colon polyps less than 60 years old.      Past Medical History:   Diagnosis Date    Arthritis      Diverticulosis     ED (erectile dysfunction)     Family history of colonic polyps     GERD (gastroesophageal reflux disease)     Hereditary hemochromatosis     History of adenomatous polyp of colon     History of colon polyps     Hypertension     Other cirrhosis of liver        Past Surgical History:   Procedure Laterality Date    COLONOSCOPY N/A 02/10/2017    One 7mm sessile serated adenomatous polyp in the ascending colon; Two 5-7mm adenomatous polyps in the transverse colon; One 4mm hyperplastic polyp in the rectum; Repeat 3 years    COLONOSCOPY  10/25/2012    One 6mm adenomatous polyp in the sigmoid colon; Repeat 4 years    COLONOSCOPY N/A 02/24/2020    Diverticulosis in the left colon; The examination was otherwise normal on direct and retroflexion views; No specimens collected; Repeat 5 years    ENDOSCOPY N/A 07/19/2021    Non-bleeding gastric ulcer with no stigmata of bleeding-biopsied; Non-bleeding erosive gastropathy; Normal examined duodenum; Repeat 2 months    ENDOSCOPY N/A 09/20/2021    Small HH; Erythematous mucosa in the antrum-biopsied; Normal examined duodenum    ENDOSCOPY N/A 05/04/2022    LA Grade A reflux esophagitis; Erythematous mucosa in the antrum-biopsied; Normal examined duodenum    MULTIPLE TOOTH EXTRACTIONS      TONSILLECTOMY          Current Outpatient Medications:     lisinopril (PRINIVIL,ZESTRIL) 20 MG tablet, Take 1 tablet by mouth Daily., Disp: , Rfl:     meloxicam (MOBIC) 15 MG tablet, Take 1 tablet by mouth Daily., Disp: , Rfl:     omeprazole (priLOSEC) 20 MG capsule, Take 1 capsule by mouth Daily., Disp: , Rfl:     Allergies   Allergen Reactions    Codeine Nausea And Vomiting       Social History     Socioeconomic History    Marital status:    Tobacco Use    Smoking status: Never    Smokeless tobacco: Never   Vaping Use    Vaping Use: Never used   Substance and Sexual Activity    Alcohol use: Not Currently    Drug use: No    Sexual activity: Defer       Family History  "  Problem Relation Age of Onset    Colon polyps Sister         < 60 years of age    No Known Problems Mother     Heart disease Father     Colon cancer Neg Hx     Esophageal cancer Neg Hx     Liver cancer Neg Hx     Liver disease Neg Hx     Rectal cancer Neg Hx     Stomach cancer Neg Hx        Review of Systems   Respiratory:  Negative for shortness of breath.    Cardiovascular:  Negative for chest pain.     Objective     /72 (BP Location: Left arm, Patient Position: Sitting, Cuff Size: Adult)   Pulse 54   Temp 97.5 °F (36.4 °C) (Infrared)   Ht 170.2 cm (67\")   Wt 93 kg (205 lb)   SpO2 95%   BMI 32.11 kg/m²     Physical Exam  Constitutional:       Appearance: He is well-developed.   Pulmonary:      Effort: Pulmonary effort is normal.   Musculoskeletal:         General: Normal range of motion.   Skin:     General: Skin is warm.   Neurological:      Mental Status: He is alert and oriented to person, place, and time.   Psychiatric:         Behavior: Behavior normal.       Lab Results - Last 18 Months   Lab Units 12/07/22  1526 07/07/22  0840 07/06/22  0759 01/07/22  1040   GLUCOSE mg/dL 117* 98 108* 95   BUN mg/dL 15 19 22 18   CREATININE mg/dL 1.19 1.2 1.24 1.3*   SODIUM mmol/L 142 141 140 142   POTASSIUM mmol/L 4.1 4.1 3.8 4.1   CHLORIDE mmol/L 106 104 106 102   TOTAL CO2 mmol/L  --  28  --  27   CO2 mmol/L 27.0  --  25.0  --    TOTAL PROTEIN g/dL 7.4 7.0 7.0 7.5   ALBUMIN g/dL 4.10 3.9 4.00 4.3   ALT (SGPT) U/L 31 22 21 35   AST (SGOT) U/L 28 22 21 31   ALK PHOS U/L 98 77 79 97   BILIRUBIN mg/dL 0.7 0.8 0.9 0.8   GLOBULIN gm/dL 3.3  --  3.0  --    ALPHA-FETOPROTEIN ng/mL 2.83  --  3  --        Lab Results - Last 18 Months   Lab Units 02/07/23  0937 12/07/22  1526 07/06/22  0759 04/14/22  0713 01/07/22  1040 01/03/22  0757   HEMOGLOBIN g/dL 16.2 15.8 16.2 15.8 14.7 15.9   HEMATOCRIT % 48.6 47.5 46.8 47.3 46.3 48.3   MCV fL 97.8* 95.2 94.0 93.3 94.7* 91.5   WBC 10*3/mm3 8.56 8.56 8.06 7.48 7.9 7.45   RDW % " 12.5 12.9 12.9 13.1 14.2 14.2   MPV fL 12.0 11.9 12.5* 12.3* 12.2 11.8   PLATELETS 10*3/mm3 118* 142 121* 124* 155 131*   INR   --  0.97  --   --   --   --        Lab Results - Last 18 Months   Lab Units 02/07/23  0937 12/07/22  1526 07/06/22  0759 04/14/22  0713 01/07/22  1040 01/03/22  0757   FERRITIN ng/mL 113.40 142.00 93.93 36.16  --  57.29   TSH uIU/mL  --   --   --   --  1.080  --    VIT D 25 HYDROXY ng/ml  --  29.9*  --   --   --   --         Lab Results - Last 18 Months   Lab Units 02/07/23  0937 12/07/22  1526   ALPHA-FETOPROTEIN ng/mL  --  2.83   FERRITIN ng/mL 113.40 142.00       EXAM/TECHNIQUE: US LIVER-     INDICATION: Cirrhosis, evaluate for hepatoma     COMPARISON: None available.     FINDINGS:     Upper abdominal aorta and IVC are unremarkable.     Visualized pancreas appears normal.     Coarsened liver echotexture and surface contour nodularity consistent  with cirrhosis. No increased liver echogenicity to suggest steatosis. No  focal liver mass identified. Patent portal vein with appropriate  directional flow.     No gallstones or gallbladder wall thickening. No biliary duct  dilatation. Common bile duct is 5 mm diameter.     RIGHT kidney is 10.2 cm in length and demonstrates normal cortical  thickness and echogenicity. No shadowing calculi or hydronephrosis.     No ascites identified.     IMPRESSION:     Cirrhosis. No suspicious focal liver lesion identified.  This report was finalized on 01/04/2023 09:24 by Dr. Eamon Brewster MD.    IMPRESSION/PLAN:    Assessment & Plan      Problem List Items Addressed This Visit          Coag and Thromboembolic    Thrombocytopenia    Overview     Likely related to advancing liver disease.         Relevant Orders    Comprehensive Metabolic Panel       Endocrine and Metabolic    Hereditary hemochromatosis (HCC)    Overview     Homozygous for H63D.  Ferritin 700 range originally and corrected with phlebotomy. Pt aware that hereditary hemochromatosis is an  independent risk factor for hepatocellular carcinoma.  He will need laboratories and serial sonograms of liver done on a regular basis.    Ferritin in correct range starting 9/2021 after a series of phlebotomy.  Was on every 3 month cycle in 2021.  Now on every 6 month cycle 2022 and ferritins are holding in acceptable range.  Phleb done 2/2023         Vitamin D insufficiency    Overview     Vitamin D level slightly low 12/2022.  Advised vitamin D3 5000 units daily.         Relevant Orders    Vitamin D,25-Hydroxy       Family History    Family history of polyps in the colon    Overview     Sister had colon polyps less than 60 years old.            Gastrointestinal Abdominal     History of adenomatous polyp of colon    Overview     Adenomatous colon polyps were removed in 2012 and 2017.  Last colonoscopy 2/2020 unremarkable.  He will be due for repeat colonoscopy in 2/2025.         Other cirrhosis of liver (HCC) - Primary    Overview     CTA of the chest shows nodular liver suggestive of cirrhosis.  Sono imaging 7/2022 and 1/2023 suggestive of cirrhosis.  LFTs are normal.  Full serological evaluation unremarkable with exception of a ferritin in the 700 range with increased iron saturation. Homozygous for H63D mutation.     Fib 4 score 7/22 is 2.46 which is nondiagnostic for advanced fibrosis.    Elastography 6/2021 = F3.    MELD 12/2022 = 8    No varices on endoscopy 2021 and again in 2022.    Coffee consumption has been shown to decrease the risk of HCC in patients with chronic liver disease.  In these patients, coffee consumption should be encouraged.    The patient was advised to stay active as possible and exercise as tolerated on a regular basis.  This is to pope off muscle wasting.  Chronic liver disease is a catabolic condition that will likely result in loss of muscle mass. Sarcopenia is associated with 2.2 fold increased risk of death and is more predictive of death than well-accepted indicaors of liver  function like MELD and CPT.    The best way of slowing this process is to exercise regularly as much as possible as well as to ingest a moderate amount of dietary protein, at least 1.0-1.5mg/kg.  The patient should have at least 3 servings of protein daily.  Daily caloric intake should be 25-40kcal/kg/day.    These patients are also at risk for osteoporosis and vitamin D deficiency.  Jacinda D level slightly low.  Sarcopenia worsens bone formation.  If evidence of osteoporosis, then treatment with biphosphonates and calcium are safe and recommended.    Fluid restriction should only take place if there is significant hyponatremia.    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    Pt. also advised to take multivitamin daily.    Vaccines are important as well:    All adults need yearly vaccines against influenza    Twinrix vaccination completed--immune to Hep A , but not Hep B.  Recombivax advised.    All patients should receive pneumococcal vaccines--both the PCV13 and the PPSV23.  Pneumococcus PCV 13 (1 dose only, 12 months apart from PPSV 23),  and Pneumococcus PPSV 23 (every 5 years, for 2 doses and third dose at age 65)    These vaccines can be obtained through the patients PCP or alternatively, they can be given at [facility] without a prescription.         Relevant Orders    AFP Tumor Marker    CBC Auto Differential    Comprehensive Metabolic Panel    Protime-INR    US Liver    Intestinal metaplasia of gastric mucosa    Overview     Found on endoscopy 2021.  Mapping done 5/2022 and intestinal metaplasia found involving the body of the stomach.  The antrum was spared.  H pylori has already been eradicated.  Will need repeat endoscopy 5/2025.         Relevant Medications    omeprazole (priLOSEC) 20 MG capsule       Other    Hepatitis A immune    Overview     Labs confirm immunity 12/2022.         Nonimmune to hepatitis B virus    Overview     He is not immune to hepatitis B.  Have  advised Recombivax series.  He is immune to hepatitis A.            MEDICAL COMPLEXITY must have 2 out of 3   Moderate Complexity Level 4                                                                                                              1 of the following medical problems:                                                                                               []One chronic illness with mild exacerbation                                                                                [x]Two or more stable chronic illness                                                                                              []One new problem  []One acute illness with systemic symptoms     Complexity of Data  Reviewed (1 out of the 3 following categories)                                             Category 1 tests, documents, historian (must have 3 points)                                                      []Review of prior external records  [x]Review of results of unique tests  [x]Ordering unique tests   []Assessment requires an independent historian   Category 2 Interpretation of tests     []Independent interpretation of test read by another doc   Category 3 Discuss Management/tests  []Discussion with external physician     Risk of complications and/or morbidity                                                                                           []Prescription Drug Management     []Decision for elective endoscopic procedure            RTC 6 months      Dorothea Sales MD  06/07/23  14:09 CDT    Part of this note may be an electronic transcription/translation of spoken language to printed text.

## 2023-06-07 ENCOUNTER — LAB (OUTPATIENT)
Dept: LAB | Facility: HOSPITAL | Age: 68
End: 2023-06-07
Payer: COMMERCIAL

## 2023-06-07 ENCOUNTER — OFFICE VISIT (OUTPATIENT)
Dept: GASTROENTEROLOGY | Facility: CLINIC | Age: 68
End: 2023-06-07
Payer: COMMERCIAL

## 2023-06-07 VITALS
OXYGEN SATURATION: 95 % | DIASTOLIC BLOOD PRESSURE: 72 MMHG | HEIGHT: 67 IN | SYSTOLIC BLOOD PRESSURE: 120 MMHG | HEART RATE: 54 BPM | BODY MASS INDEX: 32.18 KG/M2 | WEIGHT: 205 LBS | TEMPERATURE: 97.5 F

## 2023-06-07 DIAGNOSIS — E83.110 HEREDITARY HEMOCHROMATOSIS: ICD-10-CM

## 2023-06-07 DIAGNOSIS — Z86.010 HISTORY OF ADENOMATOUS POLYP OF COLON: ICD-10-CM

## 2023-06-07 DIAGNOSIS — Z83.71 FAMILY HISTORY OF POLYPS IN THE COLON: ICD-10-CM

## 2023-06-07 DIAGNOSIS — E55.9 VITAMIN D INSUFFICIENCY: ICD-10-CM

## 2023-06-07 DIAGNOSIS — Z78.9 HEPATITIS A IMMUNE: ICD-10-CM

## 2023-06-07 DIAGNOSIS — D69.6 THROMBOCYTOPENIA: ICD-10-CM

## 2023-06-07 DIAGNOSIS — K31.A0 INTESTINAL METAPLASIA OF GASTRIC MUCOSA: ICD-10-CM

## 2023-06-07 DIAGNOSIS — K74.69 OTHER CIRRHOSIS OF LIVER: ICD-10-CM

## 2023-06-07 DIAGNOSIS — K74.69 OTHER CIRRHOSIS OF LIVER: Primary | ICD-10-CM

## 2023-06-07 DIAGNOSIS — Z78.9 NONIMMUNE TO HEPATITIS B VIRUS: ICD-10-CM

## 2023-06-07 LAB
ALBUMIN SERPL-MCNC: 4 G/DL (ref 3.5–5.2)
ALBUMIN/GLOB SERPL: 1.1 G/DL
ALP SERPL-CCNC: 99 U/L (ref 39–117)
ALT SERPL W P-5'-P-CCNC: 30 U/L (ref 1–41)
ANION GAP SERPL CALCULATED.3IONS-SCNC: 9 MMOL/L (ref 5–15)
AST SERPL-CCNC: 28 U/L (ref 1–40)
BILIRUB SERPL-MCNC: 0.5 MG/DL (ref 0–1.2)
BUN SERPL-MCNC: 14 MG/DL (ref 8–23)
BUN/CREAT SERPL: 12.5 (ref 7–25)
CALCIUM SPEC-SCNC: 9.1 MG/DL (ref 8.6–10.5)
CHLORIDE SERPL-SCNC: 106 MMOL/L (ref 98–107)
CO2 SERPL-SCNC: 26 MMOL/L (ref 22–29)
CREAT SERPL-MCNC: 1.12 MG/DL (ref 0.76–1.27)
DEPRECATED RDW RBC AUTO: 45.3 FL (ref 37–54)
EGFRCR SERPLBLD CKD-EPI 2021: 72.5 ML/MIN/1.73
EOSINOPHIL # BLD MANUAL: 0.08 10*3/MM3 (ref 0–0.4)
EOSINOPHIL NFR BLD MANUAL: 1 % (ref 0.3–6.2)
ERYTHROCYTE [DISTWIDTH] IN BLOOD BY AUTOMATED COUNT: 12.9 % (ref 12.3–15.4)
GLOBULIN UR ELPH-MCNC: 3.5 GM/DL
GLUCOSE SERPL-MCNC: 84 MG/DL (ref 65–99)
HCT VFR BLD AUTO: 48.9 % (ref 37.5–51)
HGB BLD-MCNC: 16.2 G/DL (ref 13–17.7)
INR PPP: 0.97 (ref 0.91–1.09)
LYMPHOCYTES # BLD MANUAL: 4.08 10*3/MM3 (ref 0.7–3.1)
LYMPHOCYTES NFR BLD MANUAL: 10.2 % (ref 5–12)
MCH RBC QN AUTO: 31.5 PG (ref 26.6–33)
MCHC RBC AUTO-ENTMCNC: 33.1 G/DL (ref 31.5–35.7)
MCV RBC AUTO: 95.1 FL (ref 79–97)
MONOCYTES # BLD: 0.87 10*3/MM3 (ref 0.1–0.9)
NEUTROPHILS # BLD AUTO: 3.47 10*3/MM3 (ref 1.7–7)
NEUTROPHILS NFR BLD MANUAL: 38.8 % (ref 42.7–76)
NEUTS BAND NFR BLD MANUAL: 2 % (ref 0–5)
PLAT MORPH BLD: NORMAL
PLATELET # BLD AUTO: 121 10*3/MM3 (ref 140–450)
PMV BLD AUTO: 12.4 FL (ref 6–12)
POTASSIUM SERPL-SCNC: 4.1 MMOL/L (ref 3.5–5.2)
PROT SERPL-MCNC: 7.5 G/DL (ref 6–8.5)
PROTHROMBIN TIME: 13 SECONDS (ref 11.8–14.8)
RBC # BLD AUTO: 5.14 10*6/MM3 (ref 4.14–5.8)
RBC MORPH BLD: NORMAL
SODIUM SERPL-SCNC: 141 MMOL/L (ref 136–145)
VARIANT LYMPHS NFR BLD MANUAL: 39.8 % (ref 19.6–45.3)
VARIANT LYMPHS NFR BLD MANUAL: 8.2 % (ref 0–5)
WBC MORPH BLD: NORMAL
WBC NRBC COR # BLD: 8.49 10*3/MM3 (ref 3.4–10.8)

## 2023-06-07 PROCEDURE — 85025 COMPLETE CBC W/AUTO DIFF WBC: CPT

## 2023-06-07 PROCEDURE — 36415 COLL VENOUS BLD VENIPUNCTURE: CPT

## 2023-06-07 PROCEDURE — 82306 VITAMIN D 25 HYDROXY: CPT

## 2023-06-07 PROCEDURE — 80053 COMPREHEN METABOLIC PANEL: CPT

## 2023-06-07 PROCEDURE — 82105 ALPHA-FETOPROTEIN SERUM: CPT

## 2023-06-07 PROCEDURE — 85007 BL SMEAR W/DIFF WBC COUNT: CPT

## 2023-06-07 PROCEDURE — 85610 PROTHROMBIN TIME: CPT

## 2023-06-07 PROCEDURE — 99214 OFFICE O/P EST MOD 30 MIN: CPT | Performed by: INTERNAL MEDICINE

## 2023-06-07 RX ORDER — OMEPRAZOLE 20 MG/1
20 CAPSULE, DELAYED RELEASE ORAL DAILY
COMMUNITY
Start: 2023-01-16

## 2023-06-07 NOTE — LETTER
June 7, 2023       No Recipients    Patient: Frankie Reid   YOB: 1955   Date of Visit: 6/7/2023       Dear Dr. Johnson Recipients:    Thank you for referring Frankie Reid to me for evaluation. Below are the relevant portions of my assessment and plan of care.    If you have questions, please do not hesitate to call me. I look forward to following Frankie along with you.         Sincerely,        Dorothea Sales MD        CC:   No Recipients    Dorothea Sales MD  06/07/23 1410  Sign when Signing Visit  Primary Physician: Ermias Monteiro MD    Chief Complaint   Patient presents with   • Follow-up     Pt presents today for cirrhosis and hereditary hemochromatosis follow up-pt states he is feeling good        Subjective    Frankie Reid is a 68 y.o. male.  Last seen in the office December 7, 2022.    HPI  Genetic hemochromatosis with progression to cirrhosis  Patient had CTA at [facility] 4/5/2021 which showed nodular changes of the liver concerning for cirrhosis.  LFTs have been normal.  Ferritin in the 700 range. He is homozygous for H63D mutation.  No family history of liver disease.  He was drinking 1-2 cases of beer a week or 1/5th whiskey a week.  He stopped alcohol on April 4, 2021, but he has resumed a beer about 2x/week.  In 12/2022 about a beer a day.  In 6/2023 he is drinking about 3 beers/week.     Sonogram of the liver with elastography 6/2021 shows F3/F4 status.  No masses.  Endoscopy 2021 does not show varices.  Ferritin in correct range starting 9/2021 after a series of phlebotomy.    Was on every 3 month cycle in 2021 and this was changed to every 6 months in 2022.  Last phleb 2/2023.  Hasn't yet been vaccinated for Hep B.    Vitamin D insufficiency-new since last visit  Vitamin D level just below normal 12/2022.  He is taking vitamin D 5000 units daily.     Gastric intestinal metaplasia with h/o gastric ulcers  Endoscopy 9/2021 to follow-up on gastric ulcer from 7/2021 showed  antral gastric intestinal metaplasia.  He had previously had H pylori in July which was treated and then found to be cured.  Metaplasia gastric mapping via biopsy done 5/2022.  No neoplasia noted.  Plan to repeat endoscopy 5/2025.     Adenomatous colon polyps and family hx colon polyps  Adenomas were removed in 2012 and 2017.  No adenomas 2/2020.  He will need a repeat colonoscopy in 2/2025.  His sister also has had colon polyps less than 60 years old.      Past Medical History:   Diagnosis Date   • Arthritis    • Diverticulosis    • ED (erectile dysfunction)    • Family history of colonic polyps    • GERD (gastroesophageal reflux disease)    • Hereditary hemochromatosis    • History of adenomatous polyp of colon    • History of colon polyps    • Hypertension    • Other cirrhosis of liver        Past Surgical History:   Procedure Laterality Date   • COLONOSCOPY N/A 02/10/2017    One 7mm sessile serated adenomatous polyp in the ascending colon; Two 5-7mm adenomatous polyps in the transverse colon; One 4mm hyperplastic polyp in the rectum; Repeat 3 years   • COLONOSCOPY  10/25/2012    One 6mm adenomatous polyp in the sigmoid colon; Repeat 4 years   • COLONOSCOPY N/A 02/24/2020    Diverticulosis in the left colon; The examination was otherwise normal on direct and retroflexion views; No specimens collected; Repeat 5 years   • ENDOSCOPY N/A 07/19/2021    Non-bleeding gastric ulcer with no stigmata of bleeding-biopsied; Non-bleeding erosive gastropathy; Normal examined duodenum; Repeat 2 months   • ENDOSCOPY N/A 09/20/2021    Small HH; Erythematous mucosa in the antrum-biopsied; Normal examined duodenum   • ENDOSCOPY N/A 05/04/2022    LA Grade A reflux esophagitis; Erythematous mucosa in the antrum-biopsied; Normal examined duodenum   • MULTIPLE TOOTH EXTRACTIONS     • TONSILLECTOMY          Current Outpatient Medications:   •  lisinopril (PRINIVIL,ZESTRIL) 20 MG tablet, Take 1 tablet by mouth Daily., Disp: , Rfl:   •   "meloxicam (MOBIC) 15 MG tablet, Take 1 tablet by mouth Daily., Disp: , Rfl:   •  omeprazole (priLOSEC) 20 MG capsule, Take 1 capsule by mouth Daily., Disp: , Rfl:     Allergies   Allergen Reactions   • Codeine Nausea And Vomiting       Social History     Socioeconomic History   • Marital status:    Tobacco Use   • Smoking status: Never   • Smokeless tobacco: Never   Vaping Use   • Vaping Use: Never used   Substance and Sexual Activity   • Alcohol use: Not Currently   • Drug use: No   • Sexual activity: Defer       Family History   Problem Relation Age of Onset   • Colon polyps Sister         < 60 years of age   • No Known Problems Mother    • Heart disease Father    • Colon cancer Neg Hx    • Esophageal cancer Neg Hx    • Liver cancer Neg Hx    • Liver disease Neg Hx    • Rectal cancer Neg Hx    • Stomach cancer Neg Hx        Review of Systems   Respiratory:  Negative for shortness of breath.    Cardiovascular:  Negative for chest pain.     Objective    /72 (BP Location: Left arm, Patient Position: Sitting, Cuff Size: Adult)   Pulse 54   Temp 97.5 °F (36.4 °C) (Infrared)   Ht 170.2 cm (67\")   Wt 93 kg (205 lb)   SpO2 95%   BMI 32.11 kg/m²     Physical Exam  Constitutional:       Appearance: He is well-developed.   Pulmonary:      Effort: Pulmonary effort is normal.   Musculoskeletal:         General: Normal range of motion.   Skin:     General: Skin is warm.   Neurological:      Mental Status: He is alert and oriented to person, place, and time.   Psychiatric:         Behavior: Behavior normal.       Lab Results - Last 18 Months   Lab Units 12/07/22  1526 07/07/22  0840 07/06/22  0759 01/07/22  1040   GLUCOSE mg/dL 117* 98 108* 95   BUN mg/dL 15 19 22 18   CREATININE mg/dL 1.19 1.2 1.24 1.3*   SODIUM mmol/L 142 141 140 142   POTASSIUM mmol/L 4.1 4.1 3.8 4.1   CHLORIDE mmol/L 106 104 106 102   TOTAL CO2 mmol/L  --  28  --  27   CO2 mmol/L 27.0  --  25.0  --    TOTAL PROTEIN g/dL 7.4 7.0 7.0 7.5 "   ALBUMIN g/dL 4.10 3.9 4.00 4.3   ALT (SGPT) U/L 31 22 21 35   AST (SGOT) U/L 28 22 21 31   ALK PHOS U/L 98 77 79 97   BILIRUBIN mg/dL 0.7 0.8 0.9 0.8   GLOBULIN gm/dL 3.3  --  3.0  --    ALPHA-FETOPROTEIN ng/mL 2.83  --  3  --        Lab Results - Last 18 Months   Lab Units 02/07/23  0937 12/07/22  1526 07/06/22  0759 04/14/22  0713 01/07/22  1040 01/03/22  0757   HEMOGLOBIN g/dL 16.2 15.8 16.2 15.8 14.7 15.9   HEMATOCRIT % 48.6 47.5 46.8 47.3 46.3 48.3   MCV fL 97.8* 95.2 94.0 93.3 94.7* 91.5   WBC 10*3/mm3 8.56 8.56 8.06 7.48 7.9 7.45   RDW % 12.5 12.9 12.9 13.1 14.2 14.2   MPV fL 12.0 11.9 12.5* 12.3* 12.2 11.8   PLATELETS 10*3/mm3 118* 142 121* 124* 155 131*   INR   --  0.97  --   --   --   --        Lab Results - Last 18 Months   Lab Units 02/07/23  0937 12/07/22  1526 07/06/22 0759 04/14/22 0713 01/07/22  1040 01/03/22  0757   FERRITIN ng/mL 113.40 142.00 93.93 36.16  --  57.29   TSH uIU/mL  --   --   --   --  1.080  --    VIT D 25 HYDROXY ng/ml  --  29.9*  --   --   --   --         Lab Results - Last 18 Months   Lab Units 02/07/23  0937 12/07/22  1526   ALPHA-FETOPROTEIN ng/mL  --  2.83   FERRITIN ng/mL 113.40 142.00       EXAM/TECHNIQUE: US LIVER-     INDICATION: Cirrhosis, evaluate for hepatoma     COMPARISON: None available.     FINDINGS:     Upper abdominal aorta and IVC are unremarkable.     Visualized pancreas appears normal.     Coarsened liver echotexture and surface contour nodularity consistent  with cirrhosis. No increased liver echogenicity to suggest steatosis. No  focal liver mass identified. Patent portal vein with appropriate  directional flow.     No gallstones or gallbladder wall thickening. No biliary duct  dilatation. Common bile duct is 5 mm diameter.     RIGHT kidney is 10.2 cm in length and demonstrates normal cortical  thickness and echogenicity. No shadowing calculi or hydronephrosis.     No ascites identified.     IMPRESSION:     Cirrhosis. No suspicious focal liver lesion  identified.  This report was finalized on 01/04/2023 09:24 by Dr. Eamon Brewster MD.    IMPRESSION/PLAN:    Assessment & Plan     Problem List Items Addressed This Visit          Coag and Thromboembolic    Thrombocytopenia    Overview     Likely related to advancing liver disease.         Relevant Orders    Comprehensive Metabolic Panel       Endocrine and Metabolic    Hereditary hemochromatosis (HCC)    Overview     Homozygous for H63D.  Ferritin 700 range originally and corrected with phlebotomy. Pt aware that hereditary hemochromatosis is an independent risk factor for hepatocellular carcinoma.  He will need laboratories and serial sonograms of liver done on a regular basis.    Ferritin in correct range starting 9/2021 after a series of phlebotomy.  Was on every 3 month cycle in 2021.  Now on every 6 month cycle 2022 and ferritins are holding in acceptable range.  Phleb done 2/2023         Vitamin D insufficiency    Overview     Vitamin D level slightly low 12/2022.  Advised vitamin D3 5000 units daily.         Relevant Orders    Vitamin D,25-Hydroxy       Family History    Family history of polyps in the colon    Overview     Sister had colon polyps less than 60 years old.            Gastrointestinal Abdominal     History of adenomatous polyp of colon    Overview     Adenomatous colon polyps were removed in 2012 and 2017.  Last colonoscopy 2/2020 unremarkable.  He will be due for repeat colonoscopy in 2/2025.         Other cirrhosis of liver (HCC) - Primary    Overview     CTA of the chest shows nodular liver suggestive of cirrhosis.  Sono imaging 7/2022 and 1/2023 suggestive of cirrhosis.  LFTs are normal.  Full serological evaluation unremarkable with exception of a ferritin in the 700 range with increased iron saturation. Homozygous for H63D mutation.     Fib 4 score 7/22 is 2.46 which is nondiagnostic for advanced fibrosis.    Elastography 6/2021 = F3.    MELD 12/2022 = 8    No varices on endoscopy 2021 and  again in 2022.    Coffee consumption has been shown to decrease the risk of HCC in patients with chronic liver disease.  In these patients, coffee consumption should be encouraged.    The patient was advised to stay active as possible and exercise as tolerated on a regular basis.  This is to pope off muscle wasting.  Chronic liver disease is a catabolic condition that will likely result in loss of muscle mass. Sarcopenia is associated with 2.2 fold increased risk of death and is more predictive of death than well-accepted indicaors of liver function like MELD and CPT.    The best way of slowing this process is to exercise regularly as much as possible as well as to ingest a moderate amount of dietary protein, at least 1.0-1.5mg/kg.  The patient should have at least 3 servings of protein daily.  Daily caloric intake should be 25-40kcal/kg/day.    These patients are also at risk for osteoporosis and vitamin D deficiency.  Jacinda D level slightly low.  Sarcopenia worsens bone formation.  If evidence of osteoporosis, then treatment with biphosphonates and calcium are safe and recommended.    Fluid restriction should only take place if there is significant hyponatremia.    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    Pt. also advised to take multivitamin daily.    Vaccines are important as well:    All adults need yearly vaccines against influenza    Twinrix vaccination completed--immune to Hep A , but not Hep B.  Recombivax advised.    All patients should receive pneumococcal vaccines--both the PCV13 and the PPSV23.  Pneumococcus PCV 13 (1 dose only, 12 months apart from PPSV 23),  and Pneumococcus PPSV 23 (every 5 years, for 2 doses and third dose at age 65)    These vaccines can be obtained through the patients PCP or alternatively, they can be given at [facility] without a prescription.         Relevant Orders    AFP Tumor Marker    CBC Auto Differential    Comprehensive Metabolic  Panel    Protime-INR    US Liver    Intestinal metaplasia of gastric mucosa    Overview     Found on endoscopy 2021.  Mapping done 5/2022 and intestinal metaplasia found involving the body of the stomach.  The antrum was spared.  H pylori has already been eradicated.  Will need repeat endoscopy 5/2025.         Relevant Medications    omeprazole (priLOSEC) 20 MG capsule       Other    Hepatitis A immune    Overview     Labs confirm immunity 12/2022.         Nonimmune to hepatitis B virus    Overview     He is not immune to hepatitis B.  Have advised Recombivax series.  He is immune to hepatitis A.            MEDICAL COMPLEXITY must have 2 out of 3   Moderate Complexity Level 4                                                                                                              1 of the following medical problems:                                                                                               []One chronic illness with mild exacerbation                                                                                [x]Two or more stable chronic illness                                                                                              []One new problem  []One acute illness with systemic symptoms     Complexity of Data  Reviewed (1 out of the 3 following categories)                                             Category 1 tests, documents, historian (must have 3 points)                                                      []Review of prior external records  [x]Review of results of unique tests  [x]Ordering unique tests   []Assessment requires an independent historian   Category 2 Interpretation of tests     []Independent interpretation of test read by another doc   Category 3 Discuss Management/tests  []Discussion with external physician     Risk of complications and/or morbidity                                                                                           []Prescription Drug  Management     []Decision for elective endoscopic procedure            RTC 6 months      Dorothea Sales MD  06/07/23  14:09 CDT    Part of this note may be an electronic transcription/translation of spoken language to printed text.

## 2023-06-08 LAB
25(OH)D3 SERPL-MCNC: 31 NG/ML (ref 30–100)
ALPHA-FETOPROTEIN: 3.14 NG/ML (ref 0–8.3)

## 2023-06-19 ENCOUNTER — TELEPHONE (OUTPATIENT)
Dept: GASTROENTEROLOGY | Facility: CLINIC | Age: 68
End: 2023-06-19
Payer: COMMERCIAL

## 2023-06-19 NOTE — TELEPHONE ENCOUNTER
I called the lab to discuss phlebotomy-Aleta is actually out this week but the lady I talked to is going to leave her a message to make sure he will be scheduling in August. She will have Aleta call me back if there are any issues or if she needs new orders.

## 2023-06-19 NOTE — TELEPHONE ENCOUNTER
----- Message from Dorothea Sales MD sent at 6/7/2023  2:02 PM CDT -----  Regarding: phleb  Let's plan a phlebotomy in August 2023.  I am not sure if the paperwork is done or not and neither is pt.

## 2023-07-13 DIAGNOSIS — E55.9 AVITAMINOSIS D: ICD-10-CM

## 2023-07-13 DIAGNOSIS — Z92.29 HEPATITIS A AND HEPATITIS B VACCINE ADMINISTERED: ICD-10-CM

## 2023-07-13 DIAGNOSIS — R73.9 HYPERGLYCEMIA: ICD-10-CM

## 2023-07-13 DIAGNOSIS — I10 PRIMARY HYPERTENSION: ICD-10-CM

## 2023-07-13 DIAGNOSIS — Z13.220 LIPID SCREENING: ICD-10-CM

## 2023-07-13 DIAGNOSIS — Z12.5 ENCOUNTER FOR PROSTATE CANCER SCREENING: ICD-10-CM

## 2023-07-13 DIAGNOSIS — I10 ESSENTIAL (PRIMARY) HYPERTENSION: ICD-10-CM

## 2023-07-13 DIAGNOSIS — E83.110 HEREDITARY HEMOCHROMATOSIS (HCC): ICD-10-CM

## 2023-07-13 LAB
25(OH)D3 SERPL-MCNC: 34.5 NG/ML
ALBUMIN SERPL-MCNC: 4 G/DL (ref 3.5–5.2)
ALP SERPL-CCNC: 100 U/L (ref 40–130)
ALT SERPL-CCNC: 30 U/L (ref 5–41)
ANION GAP SERPL CALCULATED.3IONS-SCNC: 11 MMOL/L (ref 7–19)
AST SERPL-CCNC: 24 U/L (ref 5–40)
BASOPHILS # BLD: 0.1 K/UL (ref 0–0.2)
BASOPHILS NFR BLD: 1 % (ref 0–1)
BILIRUB SERPL-MCNC: 0.7 MG/DL (ref 0.2–1.2)
BUN SERPL-MCNC: 23 MG/DL (ref 8–23)
CALCIUM SERPL-MCNC: 9.1 MG/DL (ref 8.8–10.2)
CHLORIDE SERPL-SCNC: 105 MMOL/L (ref 98–111)
CHOLEST SERPL-MCNC: 200 MG/DL (ref 160–199)
CO2 SERPL-SCNC: 25 MMOL/L (ref 22–29)
CREAT SERPL-MCNC: 1.6 MG/DL (ref 0.5–1.2)
EOSINOPHIL # BLD: 0.18 K/UL (ref 0–0.6)
EOSINOPHIL NFR BLD: 2 % (ref 0–5)
ERYTHROCYTE [DISTWIDTH] IN BLOOD BY AUTOMATED COUNT: 13.2 % (ref 11.5–14.5)
GLUCOSE SERPL-MCNC: 102 MG/DL (ref 74–109)
HBA1C MFR BLD: 5.3 % (ref 4–6)
HBV SURFACE AB SERPL IA-ACNC: NORMAL M[IU]/ML
HCT VFR BLD AUTO: 48 % (ref 42–52)
HDLC SERPL-MCNC: 37 MG/DL (ref 55–121)
HGB BLD-MCNC: 16.4 G/DL (ref 14–18)
IMM GRANULOCYTES # BLD: 0 K/UL
LDLC SERPL CALC-MCNC: 120 MG/DL
LYMPHOCYTES # BLD: 5.6 K/UL (ref 1.1–4.5)
LYMPHOCYTES NFR BLD: 57 % (ref 20–40)
MACROCYTES BLD QL SMEAR: ABNORMAL
MCH RBC QN AUTO: 33.5 PG (ref 27–31)
MCHC RBC AUTO-ENTMCNC: 34.2 G/DL (ref 33–37)
MCV RBC AUTO: 98 FL (ref 80–94)
METAMYELOCYTES NFR BLD MANUAL: 1 %
MONOCYTES # BLD: 0.8 K/UL (ref 0–0.9)
MONOCYTES NFR BLD: 9 % (ref 0–10)
NEUTROPHILS # BLD: 2.5 K/UL (ref 1.5–7.5)
NEUTS SEG NFR BLD: 26 % (ref 50–65)
PLATELET # BLD AUTO: 142 K/UL (ref 130–400)
PLATELET SLIDE REVIEW: ADEQUATE
PMV BLD AUTO: 12 FL (ref 9.4–12.4)
POTASSIUM SERPL-SCNC: 3.9 MMOL/L (ref 3.5–5)
PROT SERPL-MCNC: 7.3 G/DL (ref 6.6–8.7)
PSA SERPL-MCNC: 0.78 NG/ML (ref 0–4)
RBC # BLD AUTO: 4.9 M/UL (ref 4.7–6.1)
SMUDGE CELLS BLD QL SMEAR: ABNORMAL
SODIUM SERPL-SCNC: 141 MMOL/L (ref 136–145)
TRIGL SERPL-MCNC: 217 MG/DL (ref 0–149)
VARIANT LYMPHS NFR BLD: 4 % (ref 0–8)
WBC # BLD AUTO: 9.2 K/UL (ref 4.8–10.8)

## 2023-07-14 LAB — HAV AB SER QL IA: POSITIVE

## 2023-07-17 ENCOUNTER — OFFICE VISIT (OUTPATIENT)
Dept: PRIMARY CARE CLINIC | Age: 68
End: 2023-07-17
Payer: COMMERCIAL

## 2023-07-17 VITALS
OXYGEN SATURATION: 96 % | WEIGHT: 209 LBS | BODY MASS INDEX: 32.73 KG/M2 | TEMPERATURE: 98 F | HEART RATE: 56 BPM | DIASTOLIC BLOOD PRESSURE: 80 MMHG | SYSTOLIC BLOOD PRESSURE: 130 MMHG

## 2023-07-17 DIAGNOSIS — N17.9 ACUTE KIDNEY INJURY (HCC): ICD-10-CM

## 2023-07-17 DIAGNOSIS — M15.9 PRIMARY OSTEOARTHRITIS INVOLVING MULTIPLE JOINTS: ICD-10-CM

## 2023-07-17 DIAGNOSIS — E78.1 HYPERTRIGLYCERIDEMIA: ICD-10-CM

## 2023-07-17 DIAGNOSIS — M25.561 CHRONIC PAIN OF BOTH KNEES: ICD-10-CM

## 2023-07-17 DIAGNOSIS — Z23 NEED FOR PNEUMOCOCCAL VACCINE: ICD-10-CM

## 2023-07-17 DIAGNOSIS — I10 PRIMARY HYPERTENSION: Primary | ICD-10-CM

## 2023-07-17 DIAGNOSIS — K74.60 CIRRHOSIS OF LIVER WITHOUT ASCITES, UNSPECIFIED HEPATIC CIRRHOSIS TYPE (HCC): ICD-10-CM

## 2023-07-17 DIAGNOSIS — M25.562 CHRONIC PAIN OF BOTH KNEES: ICD-10-CM

## 2023-07-17 DIAGNOSIS — E83.110 HEREDITARY HEMOCHROMATOSIS (HCC): ICD-10-CM

## 2023-07-17 DIAGNOSIS — G89.29 CHRONIC PAIN OF BOTH KNEES: ICD-10-CM

## 2023-07-17 PROCEDURE — 3017F COLORECTAL CA SCREEN DOC REV: CPT | Performed by: FAMILY MEDICINE

## 2023-07-17 PROCEDURE — 90732 PPSV23 VACC 2 YRS+ SUBQ/IM: CPT | Performed by: FAMILY MEDICINE

## 2023-07-17 PROCEDURE — 90471 IMMUNIZATION ADMIN: CPT | Performed by: FAMILY MEDICINE

## 2023-07-17 PROCEDURE — G8427 DOCREV CUR MEDS BY ELIG CLIN: HCPCS | Performed by: FAMILY MEDICINE

## 2023-07-17 PROCEDURE — 3078F DIAST BP <80 MM HG: CPT | Performed by: FAMILY MEDICINE

## 2023-07-17 PROCEDURE — 99214 OFFICE O/P EST MOD 30 MIN: CPT | Performed by: FAMILY MEDICINE

## 2023-07-17 PROCEDURE — 3074F SYST BP LT 130 MM HG: CPT | Performed by: FAMILY MEDICINE

## 2023-07-17 PROCEDURE — 4004F PT TOBACCO SCREEN RCVD TLK: CPT | Performed by: FAMILY MEDICINE

## 2023-07-17 PROCEDURE — 1123F ACP DISCUSS/DSCN MKR DOCD: CPT | Performed by: FAMILY MEDICINE

## 2023-07-17 PROCEDURE — G8417 CALC BMI ABV UP PARAM F/U: HCPCS | Performed by: FAMILY MEDICINE

## 2023-07-17 NOTE — PATIENT INSTRUCTIONS
You received your second pneumonia vaccine today completing the series. He did not respond to your hepatitis B vaccine. We will contact you when to restart a vaccination series. You did respond to the hepatitis A vaccine and do not need any further vaccines for this virus. Your kidney function had declined by about 25% from baseline.  -Remedied this by increasing your water intake to at least 60 ounces of fluid per day. -Return to lab to get repeat studies in 2 weeks. Monitor blood pressure at least once a week and report back if consistently greater than 130/80. We are committed to providing you with the best care possible. In order to help us achieve these goals please remember to bring all medications, herbal products, and over the counter supplements with you to each visit. If your provider has ordered testing for you, please be sure to follow up with our office if you have not received results within 7 days after the testing took place. *If you receive a survey after visiting one of our offices, please take time to share your experience concerning your physician office visit. These surveys are confidential and no health information about you is shared. We are eager to improve for you and we are counting on your feedback to help make that happen.

## 2023-07-17 NOTE — PROGRESS NOTES
offices, please take time to share your experience concerning your physician office visit. These surveys are confidential and no health information about you is shared. We are eager to improve for you and we are counting on your feedback to help make that happen. Orders Placed This Encounter   Procedures    Pneumococcal, PPSV23, PNEUMOVAX 21, (age 2 yrs+), SC/IM    Comprehensive Metabolic Panel     Standing Status:   Future     Standing Expiration Date:   7/17/2024    Ferritin     Standing Status:   Future     Standing Expiration Date:   7/17/2024    Iron     Standing Status:   Future     Standing Expiration Date:   7/17/2024     Order Specific Question:   Is Patient Fasting? Answer:   yes     Order Specific Question:   No of Hours? Answer:   12    Phosphorus     Standing Status:   Future     Standing Expiration Date:   7/17/2024    Transferrin     Standing Status:   Future     Standing Expiration Date:   7/17/2024    Uric Acid     Standing Status:   Future     Standing Expiration Date:   7/17/2024    Urinalysis     Standing Status:   Future     Standing Expiration Date:   7/17/2024       Current Outpatient Medications   Medication Sig Dispense Refill    meloxicam (MOBIC) 15 MG tablet Take 1 tablet by mouth daily as needed for Pain 30 tablet 5    lisinopril (PRINIVIL;ZESTRIL) 20 MG tablet TAKE 1 TABLET BY MOUTH ONCE DAILY 30 tablet 4    omeprazole (PRILOSEC) 20 MG delayed release capsule Take 1 capsule by mouth daily as needed (GERD) 30 capsule 11    sildenafil (VIAGRA) 100 MG tablet TAKE 1 TABLET BY MOUTH AS NEEDED FOR E.D. 3 tablet 4    diclofenac sodium (VOLTAREN) 1 % GEL Apply 2 g topically 2 times daily 200 g 5    aspirin 325 MG EC tablet Take 325 mg by mouth daily (Patient not taking: Reported on 1/13/2022)       No current facility-administered medications for this visit. Return in about 3 months (around 10/17/2023) for Routine follow up - 20 minutes.      Discussed use, benefit, and side

## 2023-07-28 DIAGNOSIS — E83.110 HEREDITARY HEMOCHROMATOSIS (HCC): ICD-10-CM

## 2023-07-28 DIAGNOSIS — K74.60 CIRRHOSIS OF LIVER WITHOUT ASCITES, UNSPECIFIED HEPATIC CIRRHOSIS TYPE (HCC): ICD-10-CM

## 2023-07-28 DIAGNOSIS — N17.9 ACUTE KIDNEY INJURY (HCC): ICD-10-CM

## 2023-07-28 LAB
ALBUMIN SERPL-MCNC: 4.1 G/DL (ref 3.5–5.2)
ALP SERPL-CCNC: 106 U/L (ref 40–130)
ALT SERPL-CCNC: 31 U/L (ref 5–41)
ANION GAP SERPL CALCULATED.3IONS-SCNC: 13 MMOL/L (ref 7–19)
AST SERPL-CCNC: 27 U/L (ref 5–40)
BILIRUB SERPL-MCNC: 0.8 MG/DL (ref 0.2–1.2)
BILIRUB UR QL STRIP: NEGATIVE
BUN SERPL-MCNC: 16 MG/DL (ref 8–23)
CALCIUM SERPL-MCNC: 8.9 MG/DL (ref 8.8–10.2)
CHLORIDE SERPL-SCNC: 102 MMOL/L (ref 98–111)
CLARITY UR: CLEAR
CO2 SERPL-SCNC: 24 MMOL/L (ref 22–29)
COLOR UR: YELLOW
CREAT SERPL-MCNC: 1.1 MG/DL (ref 0.5–1.2)
FERRITIN SERPL-MCNC: 149.1 NG/ML (ref 30–400)
GLUCOSE SERPL-MCNC: 95 MG/DL (ref 74–109)
GLUCOSE UR STRIP.AUTO-MCNC: NEGATIVE MG/DL
HBV SURFACE AB SERPL IA-ACNC: NORMAL M[IU]/ML
HGB UR STRIP.AUTO-MCNC: NEGATIVE MG/L
IRON SERPL-MCNC: 174 UG/DL (ref 59–158)
KETONES UR STRIP.AUTO-MCNC: NEGATIVE MG/DL
LEUKOCYTE ESTERASE UR QL STRIP.AUTO: NEGATIVE
NITRITE UR QL STRIP.AUTO: NEGATIVE
PH UR STRIP.AUTO: 7 [PH] (ref 5–8)
PHOSPHATE SERPL-MCNC: 3.2 MG/DL (ref 2.5–4.5)
POTASSIUM SERPL-SCNC: 3.8 MMOL/L (ref 3.5–5)
PROT SERPL-MCNC: 7.6 G/DL (ref 6.6–8.7)
PROT UR STRIP.AUTO-MCNC: NEGATIVE MG/DL
SODIUM SERPL-SCNC: 139 MMOL/L (ref 136–145)
SP GR UR STRIP.AUTO: 1.02 (ref 1–1.03)
URATE SERPL-MCNC: 7.2 MG/DL (ref 3.4–7)
UROBILINOGEN UR STRIP.AUTO-MCNC: 1 E.U./DL

## 2023-07-30 LAB — TRANSFERRIN SERPL-MCNC: 228 MG/DL (ref 200–360)

## 2023-08-03 ENCOUNTER — LAB (OUTPATIENT)
Dept: LAB | Facility: HOSPITAL | Age: 68
End: 2023-08-03
Payer: COMMERCIAL

## 2023-08-03 DIAGNOSIS — E83.110 HEREDITARY HEMOCHROMATOSIS: ICD-10-CM

## 2023-08-03 LAB
DEPRECATED RDW RBC AUTO: 45.1 FL (ref 37–54)
ERYTHROCYTE [DISTWIDTH] IN BLOOD BY AUTOMATED COUNT: 12.7 % (ref 12.3–15.4)
FERRITIN SERPL-MCNC: 136.6 NG/ML (ref 30–400)
HCT VFR BLD AUTO: 48.4 % (ref 37.5–51)
HGB BLD-MCNC: 16.3 G/DL (ref 13–17.7)
Lab: NORMAL
MCH RBC QN AUTO: 32.3 PG (ref 26.6–33)
MCHC RBC AUTO-ENTMCNC: 33.7 G/DL (ref 31.5–35.7)
MCV RBC AUTO: 96 FL (ref 79–97)
PLATELET # BLD AUTO: 122 10*3/MM3 (ref 140–450)
PMV BLD AUTO: 12.1 FL (ref 6–12)
POST-BLOOD PRESSURE: NORMAL MMHG
PRE-BLOOD PRESSURE: NORMAL MMHG
PRE-HCT: 48.4 %
PRE-HGB: 16.3 G/DL
PRE-PULSE: 54 BPM
RBC # BLD AUTO: 5.04 10*6/MM3 (ref 4.14–5.8)
VOLUME COLLECTED: 500 ML
WBC NRBC COR # BLD: 9.54 10*3/MM3 (ref 3.4–10.8)

## 2023-08-03 PROCEDURE — 99195 PHLEBOTOMY: CPT

## 2023-08-03 PROCEDURE — 36415 COLL VENOUS BLD VENIPUNCTURE: CPT

## 2023-08-03 PROCEDURE — 82728 ASSAY OF FERRITIN: CPT

## 2023-08-03 PROCEDURE — 85027 COMPLETE CBC AUTOMATED: CPT

## 2023-08-08 ENCOUNTER — TELEPHONE (OUTPATIENT)
Dept: GASTROENTEROLOGY | Facility: CLINIC | Age: 68
End: 2023-08-08
Payer: COMMERCIAL

## 2023-08-08 NOTE — TELEPHONE ENCOUNTER
----- Message from RILEY Navarro sent at 8/8/2023  1:02 PM CDT -----  Melida, can we find out how often he is getting phlebotomy?  Ferritin is a bit higher than what Dr. Sales would prefer to keep it.  So we may need to make phlebotomies a little sooner than later.  Just let me know how often he is getting and then we can go from there as to when we recommend him getting another phlebotomy.  Thanks, Akila  ----- Message -----  From: Lab, Background User  Sent: 8/3/2023   8:40 AM CDT  To: RILEY Navarro

## 2023-08-08 NOTE — TELEPHONE ENCOUNTER
I tried to call pt but didn't reach him. I did call Aleta in the lab and she confirmed that he is getting a phlebotomy every 6 months. Let me know if we need to adjust that-I will have to notify pt and send a new form down to Aleta in the lab.

## 2023-08-10 NOTE — TELEPHONE ENCOUNTER
Per Dr. Sales-continue every 6 month phlebotomies so we are not going to change any orders at this time. A result letter has also been mailed to pt.

## 2023-10-30 ENCOUNTER — OFFICE VISIT (OUTPATIENT)
Dept: PRIMARY CARE CLINIC | Age: 68
End: 2023-10-30
Payer: COMMERCIAL

## 2023-10-30 VITALS
BODY MASS INDEX: 32.73 KG/M2 | WEIGHT: 209 LBS | TEMPERATURE: 98.3 F | DIASTOLIC BLOOD PRESSURE: 100 MMHG | SYSTOLIC BLOOD PRESSURE: 166 MMHG | OXYGEN SATURATION: 96 % | HEART RATE: 63 BPM

## 2023-10-30 DIAGNOSIS — I10 ESSENTIAL (PRIMARY) HYPERTENSION: Primary | ICD-10-CM

## 2023-10-30 DIAGNOSIS — Z23 NEED FOR HEPATITIS B BOOSTER VACCINATION: ICD-10-CM

## 2023-10-30 DIAGNOSIS — Z23 NEED FOR INFLUENZA VACCINATION: ICD-10-CM

## 2023-10-30 PROCEDURE — 1123F ACP DISCUSS/DSCN MKR DOCD: CPT | Performed by: FAMILY MEDICINE

## 2023-10-30 PROCEDURE — 90472 IMMUNIZATION ADMIN EACH ADD: CPT | Performed by: FAMILY MEDICINE

## 2023-10-30 PROCEDURE — G8427 DOCREV CUR MEDS BY ELIG CLIN: HCPCS | Performed by: FAMILY MEDICINE

## 2023-10-30 PROCEDURE — G8417 CALC BMI ABV UP PARAM F/U: HCPCS | Performed by: FAMILY MEDICINE

## 2023-10-30 PROCEDURE — 3017F COLORECTAL CA SCREEN DOC REV: CPT | Performed by: FAMILY MEDICINE

## 2023-10-30 PROCEDURE — G0008 ADMIN INFLUENZA VIRUS VAC: HCPCS | Performed by: FAMILY MEDICINE

## 2023-10-30 PROCEDURE — 3078F DIAST BP <80 MM HG: CPT | Performed by: FAMILY MEDICINE

## 2023-10-30 PROCEDURE — 3074F SYST BP LT 130 MM HG: CPT | Performed by: FAMILY MEDICINE

## 2023-10-30 PROCEDURE — G8484 FLU IMMUNIZE NO ADMIN: HCPCS | Performed by: FAMILY MEDICINE

## 2023-10-30 PROCEDURE — 90694 VACC AIIV4 NO PRSRV 0.5ML IM: CPT | Performed by: FAMILY MEDICINE

## 2023-10-30 PROCEDURE — 90471 IMMUNIZATION ADMIN: CPT | Performed by: FAMILY MEDICINE

## 2023-10-30 PROCEDURE — 99213 OFFICE O/P EST LOW 20 MIN: CPT | Performed by: FAMILY MEDICINE

## 2023-10-30 PROCEDURE — 4004F PT TOBACCO SCREEN RCVD TLK: CPT | Performed by: FAMILY MEDICINE

## 2023-10-30 PROCEDURE — 90746 HEPB VACCINE 3 DOSE ADULT IM: CPT | Performed by: FAMILY MEDICINE

## 2023-10-30 NOTE — PATIENT INSTRUCTIONS
Complete the hepatitis B schedule:  Received the first vaccine today.   Return in 1 month to receive the second 1  receive the last one in 6 months

## 2023-11-30 ENCOUNTER — NURSE ONLY (OUTPATIENT)
Dept: PRIMARY CARE CLINIC | Age: 68
End: 2023-11-30
Payer: COMMERCIAL

## 2023-11-30 DIAGNOSIS — Z23 NEED FOR HEPATITIS B BOOSTER VACCINATION: Primary | ICD-10-CM

## 2023-11-30 PROCEDURE — 90746 HEPB VACCINE 3 DOSE ADULT IM: CPT | Performed by: FAMILY MEDICINE

## 2023-11-30 PROCEDURE — 90471 IMMUNIZATION ADMIN: CPT | Performed by: FAMILY MEDICINE

## 2023-12-08 NOTE — PROGRESS NOTES
Primary Physician: Ermias Monteiro MD    Chief Complaint   Patient presents with    Follow-up     Pt presents today for cirrhosis and hereditary hemochromatosis follow up-pt states he is doing good and is having phlebotomies every 6 months        Subjective     Frankie Reid is a 68 y.o. male.    HPI  Genetic hemochromatosis with progression to cirrhosis  Patient had CTA at [facility] 4/5/2021 which showed nodular changes of the liver concerning for cirrhosis.  LFTs have been normal.  Ferritin in the 700 range. He is homozygous for H63D mutation.  No family history of liver disease.  He was drinking 1-2 cases of beer a week or 1/5th whiskey a week.  He stopped alcohol on April 4, 2021, but he has resumed a beer about 2x/week.  In 12/2022 about a beer a day.  In 6/2023 he is drinking about 3 beers/week.  Even less in 12/2023--about 2 beers/month     Sonogram of the liver with elastography 6/2021 shows F3/F4 status.  No masses.  Endoscopy 2021 does not show varices.  Ferritin in correct range starting 9/2021 after a series of phlebotomy.   Was on every 3 month cycle in 2021 and this was changed to every 6 months in 2022.  Last few phleb 2/2023 and 8/2023.  Ferritin 8/2023 136.  Hb stable.  Started the vaccine for Hep B in Nov 2023.     Vitamin D insufficiency  Vitamin D level just below normal 12/2022.  He was taking vitamin D 5000 units daily, but stopped it somewhere along the way.  Level normal 7/2023.     Gastric intestinal metaplasia with h/o gastric ulcers  Endoscopy 9/2021 to follow-up on gastric ulcer from 7/2021 showed antral gastric intestinal metaplasia.  He had previously had H pylori in July which was treated and then found to be cured.  Metaplasia gastric mapping via biopsy done 5/2022.  No neoplasia noted.  Plan to repeat endoscopy 5/2025.     Adenomatous colon polyps and family hx colon polyps  Adenomas were removed in 2012 and 2017.  No adenomas 2/2020.  He will need a repeat colonoscopy  in 2/2025.  His sister also has had colon polyps less than 60 years old.      Past Medical History:   Diagnosis Date    Arthritis     Diverticulosis     ED (erectile dysfunction)     Family history of colonic polyps     GERD (gastroesophageal reflux disease)     Hereditary hemochromatosis     History of adenomatous polyp of colon     History of colon polyps     Hypertension     Other cirrhosis of liver        Past Surgical History:   Procedure Laterality Date    COLONOSCOPY N/A 02/10/2017    One 7mm sessile serated adenomatous polyp in the ascending colon; Two 5-7mm adenomatous polyps in the transverse colon; One 4mm hyperplastic polyp in the rectum; Repeat 3 years    COLONOSCOPY  10/25/2012    One 6mm adenomatous polyp in the sigmoid colon; Repeat 4 years    COLONOSCOPY N/A 02/24/2020    Diverticulosis in the left colon; The examination was otherwise normal on direct and retroflexion views; No specimens collected; Repeat 5 years    ENDOSCOPY N/A 07/19/2021    Non-bleeding gastric ulcer with no stigmata of bleeding-biopsied; Non-bleeding erosive gastropathy; Normal examined duodenum; Repeat 2 months    ENDOSCOPY N/A 09/20/2021    Small HH; Erythematous mucosa in the antrum-biopsied; Normal examined duodenum    ENDOSCOPY N/A 05/04/2022    LA Grade A reflux esophagitis; Erythematous mucosa in the antrum-biopsied; Normal examined duodenum    MULTIPLE TOOTH EXTRACTIONS      TONSILLECTOMY          Current Outpatient Medications:     lisinopril (PRINIVIL,ZESTRIL) 20 MG tablet, Take 1 tablet by mouth Daily., Disp: , Rfl:     meloxicam (MOBIC) 15 MG tablet, Take 1 tablet by mouth Daily., Disp: , Rfl:     omeprazole (priLOSEC) 20 MG capsule, Take 1 capsule by mouth Daily., Disp: , Rfl:     Allergies   Allergen Reactions    Codeine Nausea And Vomiting       Social History     Socioeconomic History    Marital status:    Tobacco Use    Smoking status: Never    Smokeless tobacco: Never   Vaping Use    Vaping Use: Never  "used   Substance and Sexual Activity    Alcohol use: Not Currently    Drug use: No    Sexual activity: Defer       Family History   Problem Relation Age of Onset    Colon polyps Sister         < 60 years of age    No Known Problems Mother     Heart disease Father     Colon cancer Neg Hx     Esophageal cancer Neg Hx     Liver cancer Neg Hx     Liver disease Neg Hx     Rectal cancer Neg Hx     Stomach cancer Neg Hx        Review of Systems   Respiratory:  Negative for shortness of breath.    Cardiovascular:  Negative for chest pain.       Objective     /72 (BP Location: Left arm, Patient Position: Sitting, Cuff Size: Adult)   Pulse 62   Temp 98 °F (36.7 °C) (Infrared)   Ht 170.2 cm (67\")   Wt 95.3 kg (210 lb)   SpO2 97%   BMI 32.89 kg/m²     Physical Exam  Constitutional:       Appearance: He is well-developed.   Pulmonary:      Effort: Pulmonary effort is normal.   Musculoskeletal:         General: Normal range of motion.   Skin:     General: Skin is warm.   Neurological:      Mental Status: He is alert and oriented to person, place, and time.   Psychiatric:         Behavior: Behavior normal.         Lab Results - Last 18 Months   Lab Units 06/07/23  1423 12/07/22  1526 07/07/22  0840 07/06/22  0759   GLUCOSE mg/dL 84 117* 98 108*   BUN mg/dL 14 15 19 22   CREATININE mg/dL 1.12 1.19 1.2 1.24   SODIUM mmol/L 141 142 141 140   POTASSIUM mmol/L 4.1 4.1 4.1 3.8   CHLORIDE mmol/L 106 106 104 106   TOTAL CO2 mmol/L  --   --  28  --    CO2 mmol/L 26.0 27.0  --  25.0   TOTAL PROTEIN g/dL 7.5 7.4 7.0 7.0   ALBUMIN g/dL 4.0 4.10 3.9 4.00   ALT (SGPT) U/L 30 31 22 21   AST (SGOT) U/L 28 28 22 21   ALK PHOS U/L 99 98 77 79   BILIRUBIN mg/dL 0.5 0.7 0.8 0.9   GLOBULIN gm/dL 3.5 3.3  --  3.0   ALPHA-FETOPROTEIN ng/mL 3.14 2.83  --  3       Lab Results - Last 18 Months   Lab Units 08/03/23  0807 07/13/23  0738 06/07/23  1423 02/07/23  0937 12/07/22  1526 12/07/22  1526 07/06/22  0759   HEMOGLOBIN g/dL 16.3 16.4 16.2 " 16.2  --  15.8 16.2   HEMATOCRIT % 48.4 48.0 48.9 48.6  --  47.5 46.8   MCV fL 96.0 98.0* 95.1 97.8*  --  95.2 94.0   WBC 10*3/mm3 9.54 9.2 8.49 8.56  --  8.56 8.06   RDW % 12.7 13.2 12.9 12.5  --  12.9 12.9   MPV fL 12.1* 12.0 12.4* 12.0  --  11.9 12.5*   PLATELETS 10*3/mm3 122* 142 121* 118*  --  142 121*   INR   --   --  0.97  --    < > 0.97  --     < > = values in this interval not displayed.       Lab Results - Last 18 Months   Lab Units 08/03/23  0807 07/28/23  1247 07/13/23  0738 06/07/23  1423 02/07/23  0937 12/07/22  1526 07/06/22  0759   IRON ug/dL  --  174*  --   --   --   --   --    FERRITIN ng/mL 136.60 149.1  --   --  113.40 142.00 93.93   VIT D 25 HYDROXY ng/mL  --   --  34.5   < >  --  29.9*  --     < > = values in this interval not displayed.        Lab Results - Last 18 Months   Lab Units 08/03/23  0807 07/28/23  1247 06/07/23  1423   ALPHA-FETOPROTEIN ng/mL  --   --  3.14   FERRITIN ng/mL 136.60   < >  --     < > = values in this interval not displayed.     US LIVER-6/27/2023 8:26 AM CDT     REASON FOR EXAM: cirrhosis--eval uate for hepatoma; K74.69-Other  cirrhosis of liver       COMPARISON: NONE      TECHNIQUE: Multiple longitudinal and transverse realtime sonographic  images of the right upper quadrant of the abdomen are obtained.      FINDINGS:    Pancreas: Obscured by overlying bowel gas..      Liver: The liver is cirrhotic in morphology with a scalloped contour of  the left lobe. The echotexture is heterogeneous but with no evidence of  a focal hepatic mass. There is hepatopedal flow within the portal vein..        Gallbladder: No intraluminal echoes, wall thickening, or pericholecystic  fluid.      Bile ducts: The CBD measures 0.39 cm in diameter. There is no  intrahepatic or extrahepatic ductal dilation.      Right kidney: Normal in size, shape and contour. No mass, hydronephrosis  or calculi. No perinephric fluid collection.       Other: The visualized abdominal aorta is normal in  caliber.      IMPRESSION:     1. The liver is cirrhotic in morphology with scalloped contours in the  left lobe and heterogeneous echotexture. No evidence of focal hepatic  mass. Color Doppler demonstrates hepatopedal flow within the portal  vein.  2. Normal gallbladder. No biliary dilatation is present.        This report was finalized on 06/27/2023 10:11 by Dr. Neno Ma MD.      IMPRESSION/PLAN:    Assessment & Plan      Problem List Items Addressed This Visit          Coag and Thromboembolic    Thrombocytopenia    Overview     Likely related to advancing liver disease.            Endocrine and Metabolic    Hereditary hemochromatosis    Overview     Homozygous for H63D.  Ferritin 700 range originally and corrected with phlebotomy. Pt aware that hereditary hemochromatosis is an independent risk factor for hepatocellular carcinoma.  He will need laboratories and serial sonograms of liver done on a regular basis.    Ferritin in correct range starting 9/2021 after a series of phlebotomy.  Was on every 3 month cycle in 2021.  Now on every 6 month cycle 2022 and ferritins are holding in acceptable range.  Phleb done 2/2023, 8/2023         Vitamin D insufficiency    Overview     Vitamin D level slightly low 12/2022.  He was taking vitamin D 5000 units daily, but stopped it somewhere along the way.  Level normal 7/2023.  He knows to resume it.  He has some at home already.         Relevant Orders    Vitamin D,25-Hydroxy       Gastrointestinal Abdominal     History of adenomatous polyp of colon    Overview     Adenomatous colon polyps were removed in 2012 and 2017.  Last colonoscopy 2/2020 unremarkable.  He will be due for repeat colonoscopy in 2/2025.         Intestinal metaplasia of gastric mucosa    Overview     Found on endoscopy 2021.  Mapping done 5/2022 and intestinal metaplasia found involving the body of the stomach.  The antrum was spared.  H pylori has already been eradicated.  Will need repeat  endoscopy 5/2025.         Other cirrhosis of liver - Primary    Overview     CTA of the chest shows nodular liver suggestive of cirrhosis.  Sono imaging 7/2022, 1/2023 and 6/2023 suggestive of cirrhosis.  LFTs are normal.  Full serological evaluation unremarkable with exception of a ferritin in the 700 range with increased iron saturation. Homozygous for H63D mutation.  Patient is at risk for hepatoma formation given cirrhosis; will need to have serial sono of the liver and AFP Measurements every 6-12 months going forward.      Fib 4 7/22 = 2.46   Fib 4 6/2023 = 2.79    Fib-4 scoring system  Points <1.45:                      Cirrhosis less likely  Points ?1.45 and ?3.25:       Indeterminate  Points >3.25:                      Cirrhosis more likely    Elastography 6/2021 = F3.    MELD 12/2022 = 8  MELD 6/2023 = 8    No varices on endoscopy 2021 and again in 2022.    Coffee consumption has been shown to decrease the risk of HCC in patients with chronic liver disease.  In these patients, coffee consumption should be encouraged.  He is trying to do so.    The patient was advised to stay active as possible and exercise as tolerated on a regular basis.  This is to pope off muscle wasting.  Chronic liver disease is a catabolic condition that will likely result in loss of muscle mass. Sarcopenia is associated with 2.2 fold increased risk of death and is more predictive of death than well-accepted indicaors of liver function like MELD and CPT.    The best way of slowing this process is to exercise regularly as much as possible as well as to ingest a moderate amount of dietary protein, at least 1.0-1.5mg/kg.  The patient should have at least 3 servings of protein daily.  Daily caloric intake should be 25-40kcal/kg/day.    These patients are also at risk for osteoporosis and vitamin D deficiency.  Jacinda D level slightly low--on supplement with normalization of level.  Sarcopenia worsens bone formation.  If evidence of  osteoporosis, then treatment with biphosphonates and calcium are safe and recommended.    Fluid restriction should only take place if there is significant hyponatremia.    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    Pt. also advised to take multivitamin daily.    Vaccines are important as well:    All adults need yearly vaccines against influenza    Twinrix vaccination completed--immune to Hep A , but not Hep B.  Recombivax advised.    All patients should receive pneumococcal vaccines--both the PCV13 and the PPSV23.  Pneumococcus PCV 13 (1 dose only, 12 months apart from PPSV 23),  and Pneumococcus PPSV 23 (every 5 years, for 2 doses and third dose at age 65)    These vaccines can be obtained through the patients PCP or alternatively, they can be given at [facility] without a prescription.            Other    Hepatitis A immune    Overview     Labs confirm immunity 12/2022.         Nonimmune to hepatitis B virus    Overview     He is not immune to hepatitis B.  Have advised Recombivax series.  He is immune to hepatitis A.          MEDICAL COMPLEXITY must have 2 out of 3   Moderate Complexity Level 4                                                                                                              1 of the following medical problems:                                                                                               []One chronic illness with mild exacerbation                                                                                [x]Two or more stable chronic illness                                                                                              []One new problem  []One acute illness with systemic symptoms     Complexity of Data  Reviewed (1 out of the 3 following categories)                                             Category 1 tests, documents, historian (must have 3 points)                                                      []Review  of prior external records  []Review of results of unique tests  [x]Ordering unique tests   []Assessment requires an independent historian   Category 2 Interpretation of tests     []Independent interpretation of test read by another doc   Category 3 Discuss Management/tests  []Discussion with external physician     Risk of complications and/or morbidity                                                                                           [x]Prescription Drug Management     []Decision for elective endoscopic procedure              RTC 6 months      Dorothea Sales MD  12/13/23  14:14 CST    Part of this note may be an electronic transcription/translation of spoken language to printed text.

## 2023-12-13 ENCOUNTER — OFFICE VISIT (OUTPATIENT)
Dept: GASTROENTEROLOGY | Facility: CLINIC | Age: 68
End: 2023-12-13
Payer: COMMERCIAL

## 2023-12-13 VITALS
HEART RATE: 62 BPM | SYSTOLIC BLOOD PRESSURE: 122 MMHG | OXYGEN SATURATION: 97 % | TEMPERATURE: 98 F | HEIGHT: 67 IN | BODY MASS INDEX: 32.96 KG/M2 | DIASTOLIC BLOOD PRESSURE: 72 MMHG | WEIGHT: 210 LBS

## 2023-12-13 DIAGNOSIS — E55.9 VITAMIN D INSUFFICIENCY: ICD-10-CM

## 2023-12-13 DIAGNOSIS — Z86.010 HISTORY OF ADENOMATOUS POLYP OF COLON: ICD-10-CM

## 2023-12-13 DIAGNOSIS — K74.69 OTHER CIRRHOSIS OF LIVER: Primary | ICD-10-CM

## 2023-12-13 DIAGNOSIS — K31.A0 INTESTINAL METAPLASIA OF GASTRIC MUCOSA: ICD-10-CM

## 2023-12-13 DIAGNOSIS — D69.6 THROMBOCYTOPENIA: ICD-10-CM

## 2023-12-13 DIAGNOSIS — Z78.9 HEPATITIS A IMMUNE: ICD-10-CM

## 2023-12-13 DIAGNOSIS — E83.110 HEREDITARY HEMOCHROMATOSIS: ICD-10-CM

## 2023-12-13 DIAGNOSIS — Z78.9 NONIMMUNE TO HEPATITIS B VIRUS: ICD-10-CM

## 2023-12-13 PROCEDURE — 99214 OFFICE O/P EST MOD 30 MIN: CPT | Performed by: INTERNAL MEDICINE

## 2023-12-13 NOTE — LETTER
December 13, 2023       No Recipients    Patient: Frankie Reid   YOB: 1955   Date of Visit: 12/13/2023     Dear Ermias Monteiro MD:       Thank you for referring Frankie Reid to me for evaluation. Below are the relevant portions of my assessment and plan of care.    If you have questions, please do not hesitate to call me. I look forward to following Frankie along with you.         Sincerely,        Dorothea Sales MD        CC:   No Recipients    Dorothea Sales MD  12/13/23 1416  Sign when Signing Visit  Primary Physician: Ermias Monteiro MD    Chief Complaint   Patient presents with   • Follow-up     Pt presents today for cirrhosis and hereditary hemochromatosis follow up-pt states he is doing good and is having phlebotomies every 6 months        Subjective    Frankie Reid is a 68 y.o. male.    HPI  Genetic hemochromatosis with progression to cirrhosis  Patient had CTA at [facility] 4/5/2021 which showed nodular changes of the liver concerning for cirrhosis.  LFTs have been normal.  Ferritin in the 700 range. He is homozygous for H63D mutation.  No family history of liver disease.  He was drinking 1-2 cases of beer a week or 1/5th whiskey a week.  He stopped alcohol on April 4, 2021, but he has resumed a beer about 2x/week.  In 12/2022 about a beer a day.  In 6/2023 he is drinking about 3 beers/week.  Even less in 12/2023--about 2 beers/month     Sonogram of the liver with elastography 6/2021 shows F3/F4 status.  No masses.  Endoscopy 2021 does not show varices.  Ferritin in correct range starting 9/2021 after a series of phlebotomy.   Was on every 3 month cycle in 2021 and this was changed to every 6 months in 2022.  Last few phleb 2/2023 and 8/2023.  Ferritin 8/2023 136.  Hb stable.  Started the vaccine for Hep B in Nov 2023.     Vitamin D insufficiency  Vitamin D level just below normal 12/2022.  He was taking vitamin D 5000 units daily, but stopped it somewhere along the way.   Level normal 7/2023.     Gastric intestinal metaplasia with h/o gastric ulcers  Endoscopy 9/2021 to follow-up on gastric ulcer from 7/2021 showed antral gastric intestinal metaplasia.  He had previously had H pylori in July which was treated and then found to be cured.  Metaplasia gastric mapping via biopsy done 5/2022.  No neoplasia noted.  Plan to repeat endoscopy 5/2025.     Adenomatous colon polyps and family hx colon polyps  Adenomas were removed in 2012 and 2017.  No adenomas 2/2020.  He will need a repeat colonoscopy in 2/2025.  His sister also has had colon polyps less than 60 years old.      Past Medical History:   Diagnosis Date   • Arthritis    • Diverticulosis    • ED (erectile dysfunction)    • Family history of colonic polyps    • GERD (gastroesophageal reflux disease)    • Hereditary hemochromatosis    • History of adenomatous polyp of colon    • History of colon polyps    • Hypertension    • Other cirrhosis of liver        Past Surgical History:   Procedure Laterality Date   • COLONOSCOPY N/A 02/10/2017    One 7mm sessile serated adenomatous polyp in the ascending colon; Two 5-7mm adenomatous polyps in the transverse colon; One 4mm hyperplastic polyp in the rectum; Repeat 3 years   • COLONOSCOPY  10/25/2012    One 6mm adenomatous polyp in the sigmoid colon; Repeat 4 years   • COLONOSCOPY N/A 02/24/2020    Diverticulosis in the left colon; The examination was otherwise normal on direct and retroflexion views; No specimens collected; Repeat 5 years   • ENDOSCOPY N/A 07/19/2021    Non-bleeding gastric ulcer with no stigmata of bleeding-biopsied; Non-bleeding erosive gastropathy; Normal examined duodenum; Repeat 2 months   • ENDOSCOPY N/A 09/20/2021    Small HH; Erythematous mucosa in the antrum-biopsied; Normal examined duodenum   • ENDOSCOPY N/A 05/04/2022    LA Grade A reflux esophagitis; Erythematous mucosa in the antrum-biopsied; Normal examined duodenum   • MULTIPLE TOOTH EXTRACTIONS     •  "TONSILLECTOMY          Current Outpatient Medications:   •  lisinopril (PRINIVIL,ZESTRIL) 20 MG tablet, Take 1 tablet by mouth Daily., Disp: , Rfl:   •  meloxicam (MOBIC) 15 MG tablet, Take 1 tablet by mouth Daily., Disp: , Rfl:   •  omeprazole (priLOSEC) 20 MG capsule, Take 1 capsule by mouth Daily., Disp: , Rfl:     Allergies   Allergen Reactions   • Codeine Nausea And Vomiting       Social History     Socioeconomic History   • Marital status:    Tobacco Use   • Smoking status: Never   • Smokeless tobacco: Never   Vaping Use   • Vaping Use: Never used   Substance and Sexual Activity   • Alcohol use: Not Currently   • Drug use: No   • Sexual activity: Defer       Family History   Problem Relation Age of Onset   • Colon polyps Sister         < 60 years of age   • No Known Problems Mother    • Heart disease Father    • Colon cancer Neg Hx    • Esophageal cancer Neg Hx    • Liver cancer Neg Hx    • Liver disease Neg Hx    • Rectal cancer Neg Hx    • Stomach cancer Neg Hx        Review of Systems   Respiratory:  Negative for shortness of breath.    Cardiovascular:  Negative for chest pain.       Objective    /72 (BP Location: Left arm, Patient Position: Sitting, Cuff Size: Adult)   Pulse 62   Temp 98 °F (36.7 °C) (Infrared)   Ht 170.2 cm (67\")   Wt 95.3 kg (210 lb)   SpO2 97%   BMI 32.89 kg/m²     Physical Exam  Constitutional:       Appearance: He is well-developed.   Pulmonary:      Effort: Pulmonary effort is normal.   Musculoskeletal:         General: Normal range of motion.   Skin:     General: Skin is warm.   Neurological:      Mental Status: He is alert and oriented to person, place, and time.   Psychiatric:         Behavior: Behavior normal.         Lab Results - Last 18 Months   Lab Units 06/07/23  1423 12/07/22  1526 07/07/22  0840 07/06/22  0759   GLUCOSE mg/dL 84 117* 98 108*   BUN mg/dL 14 15 19 22   CREATININE mg/dL 1.12 1.19 1.2 1.24   SODIUM mmol/L 141 142 141 140   POTASSIUM mmol/L " 4.1 4.1 4.1 3.8   CHLORIDE mmol/L 106 106 104 106   TOTAL CO2 mmol/L  --   --  28  --    CO2 mmol/L 26.0 27.0  --  25.0   TOTAL PROTEIN g/dL 7.5 7.4 7.0 7.0   ALBUMIN g/dL 4.0 4.10 3.9 4.00   ALT (SGPT) U/L 30 31 22 21   AST (SGOT) U/L 28 28 22 21   ALK PHOS U/L 99 98 77 79   BILIRUBIN mg/dL 0.5 0.7 0.8 0.9   GLOBULIN gm/dL 3.5 3.3  --  3.0   ALPHA-FETOPROTEIN ng/mL 3.14 2.83  --  3       Lab Results - Last 18 Months   Lab Units 08/03/23  0807 07/13/23  0738 06/07/23  1423 02/07/23  0937 12/07/22  1526 12/07/22  1526 07/06/22  0759   HEMOGLOBIN g/dL 16.3 16.4 16.2 16.2  --  15.8 16.2   HEMATOCRIT % 48.4 48.0 48.9 48.6  --  47.5 46.8   MCV fL 96.0 98.0* 95.1 97.8*  --  95.2 94.0   WBC 10*3/mm3 9.54 9.2 8.49 8.56  --  8.56 8.06   RDW % 12.7 13.2 12.9 12.5  --  12.9 12.9   MPV fL 12.1* 12.0 12.4* 12.0  --  11.9 12.5*   PLATELETS 10*3/mm3 122* 142 121* 118*  --  142 121*   INR   --   --  0.97  --    < > 0.97  --     < > = values in this interval not displayed.       Lab Results - Last 18 Months   Lab Units 08/03/23  0807 07/28/23  1247 07/13/23  0738 06/07/23  1423 02/07/23  0937 12/07/22  1526 07/06/22  0759   IRON ug/dL  --  174*  --   --   --   --   --    FERRITIN ng/mL 136.60 149.1  --   --  113.40 142.00 93.93   VIT D 25 HYDROXY ng/mL  --   --  34.5   < >  --  29.9*  --     < > = values in this interval not displayed.        Lab Results - Last 18 Months   Lab Units 08/03/23  0807 07/28/23  1247 06/07/23  1423   ALPHA-FETOPROTEIN ng/mL  --   --  3.14   FERRITIN ng/mL 136.60   < >  --     < > = values in this interval not displayed.     US LIVER-6/27/2023 8:26 AM CDT     REASON FOR EXAM: cirrhosis--eval uate for hepatoma; K74.69-Other  cirrhosis of liver       COMPARISON: NONE      TECHNIQUE: Multiple longitudinal and transverse realtime sonographic  images of the right upper quadrant of the abdomen are obtained.      FINDINGS:    Pancreas: Obscured by overlying bowel gas..      Liver: The liver is cirrhotic in  morphology with a scalloped contour of  the left lobe. The echotexture is heterogeneous but with no evidence of  a focal hepatic mass. There is hepatopedal flow within the portal vein..        Gallbladder: No intraluminal echoes, wall thickening, or pericholecystic  fluid.      Bile ducts: The CBD measures 0.39 cm in diameter. There is no  intrahepatic or extrahepatic ductal dilation.      Right kidney: Normal in size, shape and contour. No mass, hydronephrosis  or calculi. No perinephric fluid collection.       Other: The visualized abdominal aorta is normal in caliber.      IMPRESSION:     1. The liver is cirrhotic in morphology with scalloped contours in the  left lobe and heterogeneous echotexture. No evidence of focal hepatic  mass. Color Doppler demonstrates hepatopedal flow within the portal  vein.  2. Normal gallbladder. No biliary dilatation is present.        This report was finalized on 06/27/2023 10:11 by Dr. Neno Ma MD.      IMPRESSION/PLAN:    Assessment & Plan     Problem List Items Addressed This Visit          Coag and Thromboembolic    Thrombocytopenia    Overview     Likely related to advancing liver disease.            Endocrine and Metabolic    Hereditary hemochromatosis    Overview     Homozygous for H63D.  Ferritin 700 range originally and corrected with phlebotomy. Pt aware that hereditary hemochromatosis is an independent risk factor for hepatocellular carcinoma.  He will need laboratories and serial sonograms of liver done on a regular basis.    Ferritin in correct range starting 9/2021 after a series of phlebotomy.  Was on every 3 month cycle in 2021.  Now on every 6 month cycle 2022 and ferritins are holding in acceptable range.  Phleb done 2/2023, 8/2023         Vitamin D insufficiency    Overview     Vitamin D level slightly low 12/2022.  He was taking vitamin D 5000 units daily, but stopped it somewhere along the way.  Level normal 7/2023.  He knows to resume it.  He has some  at home already.         Relevant Orders    Vitamin D,25-Hydroxy       Gastrointestinal Abdominal     History of adenomatous polyp of colon    Overview     Adenomatous colon polyps were removed in 2012 and 2017.  Last colonoscopy 2/2020 unremarkable.  He will be due for repeat colonoscopy in 2/2025.         Intestinal metaplasia of gastric mucosa    Overview     Found on endoscopy 2021.  Mapping done 5/2022 and intestinal metaplasia found involving the body of the stomach.  The antrum was spared.  H pylori has already been eradicated.  Will need repeat endoscopy 5/2025.         Other cirrhosis of liver - Primary    Overview     CTA of the chest shows nodular liver suggestive of cirrhosis.  Sono imaging 7/2022, 1/2023 and 6/2023 suggestive of cirrhosis.  LFTs are normal.  Full serological evaluation unremarkable with exception of a ferritin in the 700 range with increased iron saturation. Homozygous for H63D mutation.  Patient is at risk for hepatoma formation given cirrhosis; will need to have serial sono of the liver and AFP Measurements every 6-12 months going forward.      Fib 4 7/22 = 2.46   Fib 4 6/2023 = 2.79    Fib-4 scoring system  Points <1.45:                      Cirrhosis less likely  Points =1.45 and =3.25:       Indeterminate  Points >3.25:                      Cirrhosis more likely    Elastography 6/2021 = F3.    MELD 12/2022 = 8  MELD 6/2023 = 8    No varices on endoscopy 2021 and again in 2022.    Coffee consumption has been shown to decrease the risk of HCC in patients with chronic liver disease.  In these patients, coffee consumption should be encouraged.  He is trying to do so.    The patient was advised to stay active as possible and exercise as tolerated on a regular basis.  This is to pope off muscle wasting.  Chronic liver disease is a catabolic condition that will likely result in loss of muscle mass. Sarcopenia is associated with 2.2 fold increased risk of death and is more predictive of  death than well-accepted indicaors of liver function like MELD and CPT.    The best way of slowing this process is to exercise regularly as much as possible as well as to ingest a moderate amount of dietary protein, at least 1.0-1.5mg/kg.  The patient should have at least 3 servings of protein daily.  Daily caloric intake should be 25-40kcal/kg/day.    These patients are also at risk for osteoporosis and vitamin D deficiency.  Jacinda D level slightly low--on supplement with normalization of level.  Sarcopenia worsens bone formation.  If evidence of osteoporosis, then treatment with biphosphonates and calcium are safe and recommended.    Fluid restriction should only take place if there is significant hyponatremia.    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    Pt. also advised to take multivitamin daily.    Vaccines are important as well:    All adults need yearly vaccines against influenza    Twinrix vaccination completed--immune to Hep A , but not Hep B.  Recombivax advised.    All patients should receive pneumococcal vaccines--both the PCV13 and the PPSV23.  Pneumococcus PCV 13 (1 dose only, 12 months apart from PPSV 23),  and Pneumococcus PPSV 23 (every 5 years, for 2 doses and third dose at age 65)    These vaccines can be obtained through the patients PCP or alternatively, they can be given at [facility] without a prescription.            Other    Hepatitis A immune    Overview     Labs confirm immunity 12/2022.         Nonimmune to hepatitis B virus    Overview     He is not immune to hepatitis B.  Have advised Recombivax series.  He is immune to hepatitis A.          MEDICAL COMPLEXITY must have 2 out of 3   Moderate Complexity Level 4                                                                                                              1 of the following medical problems:                                                                                                []One chronic illness with mild exacerbation                                                                                [x]Two or more stable chronic illness                                                                                              []One new problem  []One acute illness with systemic symptoms     Complexity of Data  Reviewed (1 out of the 3 following categories)                                             Category 1 tests, documents, historian (must have 3 points)                                                      []Review of prior external records  []Review of results of unique tests  [x]Ordering unique tests   []Assessment requires an independent historian   Category 2 Interpretation of tests     []Independent interpretation of test read by another doc   Category 3 Discuss Management/tests  []Discussion with external physician     Risk of complications and/or morbidity                                                                                           [x]Prescription Drug Management     []Decision for elective endoscopic procedure              RTC 6 months      Dorothea Sales MD  12/13/23  14:14 CST    Part of this note may be an electronic transcription/translation of spoken language to printed text.

## 2024-02-01 ENCOUNTER — OFFICE VISIT (OUTPATIENT)
Dept: PRIMARY CARE CLINIC | Age: 69
End: 2024-02-01
Payer: COMMERCIAL

## 2024-02-01 VITALS
SYSTOLIC BLOOD PRESSURE: 148 MMHG | BODY MASS INDEX: 33.27 KG/M2 | HEIGHT: 67 IN | OXYGEN SATURATION: 96 % | DIASTOLIC BLOOD PRESSURE: 100 MMHG | HEART RATE: 60 BPM | WEIGHT: 212 LBS | TEMPERATURE: 97.8 F

## 2024-02-01 DIAGNOSIS — M25.561 CHRONIC PAIN OF BOTH KNEES: ICD-10-CM

## 2024-02-01 DIAGNOSIS — K21.9 GASTROESOPHAGEAL REFLUX DISEASE WITHOUT ESOPHAGITIS: ICD-10-CM

## 2024-02-01 DIAGNOSIS — E83.110 HEREDITARY HEMOCHROMATOSIS (HCC): ICD-10-CM

## 2024-02-01 DIAGNOSIS — K74.60 CIRRHOSIS OF LIVER WITHOUT ASCITES, UNSPECIFIED HEPATIC CIRRHOSIS TYPE (HCC): ICD-10-CM

## 2024-02-01 DIAGNOSIS — M15.9 PRIMARY OSTEOARTHRITIS INVOLVING MULTIPLE JOINTS: ICD-10-CM

## 2024-02-01 DIAGNOSIS — I10 ESSENTIAL (PRIMARY) HYPERTENSION: Primary | ICD-10-CM

## 2024-02-01 DIAGNOSIS — G89.29 CHRONIC PAIN OF BOTH KNEES: ICD-10-CM

## 2024-02-01 DIAGNOSIS — Z12.5 ENCOUNTER FOR PROSTATE CANCER SCREENING: ICD-10-CM

## 2024-02-01 DIAGNOSIS — M25.562 CHRONIC PAIN OF BOTH KNEES: ICD-10-CM

## 2024-02-01 DIAGNOSIS — Z13.220 LIPID SCREENING: ICD-10-CM

## 2024-02-01 DIAGNOSIS — I10 PRIMARY HYPERTENSION: ICD-10-CM

## 2024-02-01 PROCEDURE — G8427 DOCREV CUR MEDS BY ELIG CLIN: HCPCS | Performed by: FAMILY MEDICINE

## 2024-02-01 PROCEDURE — 99213 OFFICE O/P EST LOW 20 MIN: CPT | Performed by: FAMILY MEDICINE

## 2024-02-01 PROCEDURE — 4004F PT TOBACCO SCREEN RCVD TLK: CPT | Performed by: FAMILY MEDICINE

## 2024-02-01 PROCEDURE — 3080F DIAST BP >= 90 MM HG: CPT | Performed by: FAMILY MEDICINE

## 2024-02-01 PROCEDURE — 3017F COLORECTAL CA SCREEN DOC REV: CPT | Performed by: FAMILY MEDICINE

## 2024-02-01 PROCEDURE — G8417 CALC BMI ABV UP PARAM F/U: HCPCS | Performed by: FAMILY MEDICINE

## 2024-02-01 PROCEDURE — 3077F SYST BP >= 140 MM HG: CPT | Performed by: FAMILY MEDICINE

## 2024-02-01 PROCEDURE — G8484 FLU IMMUNIZE NO ADMIN: HCPCS | Performed by: FAMILY MEDICINE

## 2024-02-01 PROCEDURE — 1123F ACP DISCUSS/DSCN MKR DOCD: CPT | Performed by: FAMILY MEDICINE

## 2024-02-01 RX ORDER — LISINOPRIL 40 MG/1
TABLET ORAL
Qty: 30 TABLET | Refills: 5 | Status: SHIPPED | OUTPATIENT
Start: 2024-02-01

## 2024-02-01 NOTE — PROGRESS NOTES
[facility]  [address]  [address]  [place] [address]  Dept: [phone]  Dept Fax: [phone]  Loc: [phone]    Marcio Delarosa is a 68 y.o. male who presents today for his medical conditions/complaints as noted below.  Marcio Delarosa is here for 3 Month Follow-Up        Subjective:   CC:  Here today to discuss the following:    Hemochromatosis with cirrhosis.  -[facility] gastroenterology  - -Receives therapeutic phlebotomies.  -Ultrasound liver: June 2023:The liver is cirrhotic in morphology with scalloped contours in the   left lobe and heterogeneous echotexture. No evidence of focal hepatic   mass   - Ultrasound elastography: Metavir Score F>/=3:Moderate-Severe   Hypertension  Compliant with medications.  No adverse effects from medication.  No lightheadedness, palpitations, or chest pain.      Review of Systems   Constitutional: Negative for chills and fever.   HENT: Negative for congestion.    Respiratory: Negative for cough, chest tightness and shortness of breath.  Cardiovascular: Negative for chest pain, palpitations and leg swelling.   Gastrointestinal: Negative for abdominal pain, anal bleeding, constipation, diarrhea and nausea.     Review of Systems  Objective:   BP (!) 148/100   Pulse 60   Temp 97.8 °F (36.6 °C)   Ht 1.702 m (5' 7\")   Wt 96.2 kg (212 lb)   SpO2 96%   BMI 33.20 kg/m²   BP Readings from Last 3 Encounters:   02/01/24 (!) 148/100   10/30/23 (!) 166/100   07/17/23 130/80    Wt Readings from Last 3 Encounters:   02/01/24 96.2 kg (212 lb)   10/30/23 94.8 kg (209 lb)   07/17/23 94.8 kg (209 lb)         Physical Exam   Constitutional: He appears well. Does not appear ill.   Neck: Neck supple. No masses.  Neck Symmetric.  Normal tracheal position.  No thyroid enlargement  Cardiovascular: Normal rate and regular rhythm.  Exam reveals no friction rub.  No murmur heard.  Respiratory:  Effort normal and breath sounds normal. No

## 2024-02-01 NOTE — PATIENT INSTRUCTIONS
Blood pressure:  Goal less than 130/80  Increase lisinopril to 40 mg  once a day    Check blood pressure at least once a week following the instructions below.   - bring your cuff and blood pressure log into next to visit to compare readings with me.                   How to Check Blood Pressure

## 2024-02-27 ENCOUNTER — TRANSCRIBE ORDERS (OUTPATIENT)
Dept: ADMINISTRATIVE | Facility: HOSPITAL | Age: 69
End: 2024-02-27
Payer: COMMERCIAL

## 2024-02-27 ENCOUNTER — LAB (OUTPATIENT)
Dept: LAB | Facility: HOSPITAL | Age: 69
End: 2024-02-27
Payer: COMMERCIAL

## 2024-02-27 DIAGNOSIS — E83.110 HEREDITARY HEMOCHROMATOSIS: Primary | ICD-10-CM

## 2024-02-27 LAB
BASOPHILS # BLD MANUAL: 0.11 10*3/MM3 (ref 0–0.2)
BASOPHILS NFR BLD MANUAL: 1 % (ref 0–1.5)
DEPRECATED RDW RBC AUTO: 43.8 FL (ref 37–54)
EOSINOPHIL # BLD MANUAL: 0.43 10*3/MM3 (ref 0–0.4)
EOSINOPHIL NFR BLD MANUAL: 4 % (ref 0.3–6.2)
ERYTHROCYTE [DISTWIDTH] IN BLOOD BY AUTOMATED COUNT: 12.5 % (ref 12.3–15.4)
FERRITIN SERPL-MCNC: 129.5 NG/ML (ref 30–400)
GIANT PLATELETS: ABNORMAL
HCT VFR BLD AUTO: 45.9 % (ref 37.5–51)
HGB BLD-MCNC: 15.9 G/DL (ref 13–17.7)
LYMPHOCYTES # BLD MANUAL: 4.19 10*3/MM3 (ref 0.7–3.1)
LYMPHOCYTES NFR BLD MANUAL: 13 % (ref 5–12)
Lab: NORMAL
MCH RBC QN AUTO: 33.1 PG (ref 26.6–33)
MCHC RBC AUTO-ENTMCNC: 34.6 G/DL (ref 31.5–35.7)
MCV RBC AUTO: 95.4 FL (ref 79–97)
MONOCYTES # BLD: 1.4 10*3/MM3 (ref 0.1–0.9)
NEUTROPHILS # BLD AUTO: 4.62 10*3/MM3 (ref 1.7–7)
NEUTROPHILS NFR BLD MANUAL: 43 % (ref 42.7–76)
PLATELET # BLD AUTO: 107 10*3/MM3 (ref 140–450)
PMV BLD AUTO: 12.1 FL (ref 6–12)
POLYCHROMASIA BLD QL SMEAR: ABNORMAL
POST-BLOOD PRESSURE: NORMAL MMHG
PRE-BLOOD PRESSURE: NORMAL MMHG
PRE-HCT: 45.9 %
PRE-HGB: 15.9 G/DL
PRE-PULSE: 55 BPM
RBC # BLD AUTO: 4.81 10*6/MM3 (ref 4.14–5.8)
SMALL PLATELETS BLD QL SMEAR: ABNORMAL
SMUDGE CELLS BLD QL SMEAR: ABNORMAL
VARIANT LYMPHS NFR BLD MANUAL: 30 % (ref 19.6–45.3)
VARIANT LYMPHS NFR BLD MANUAL: 9 % (ref 0–5)
VOLUME COLLECTED: 500 ML
WBC NRBC COR # BLD AUTO: 10.74 10*3/MM3 (ref 3.4–10.8)

## 2024-02-27 PROCEDURE — 82728 ASSAY OF FERRITIN: CPT

## 2024-02-27 PROCEDURE — 85025 COMPLETE CBC W/AUTO DIFF WBC: CPT

## 2024-02-27 PROCEDURE — 85007 BL SMEAR W/DIFF WBC COUNT: CPT

## 2024-02-27 PROCEDURE — 36415 COLL VENOUS BLD VENIPUNCTURE: CPT

## 2024-03-05 DIAGNOSIS — M15.9 PRIMARY OSTEOARTHRITIS INVOLVING MULTIPLE JOINTS: ICD-10-CM

## 2024-03-05 DIAGNOSIS — K21.9 GASTROESOPHAGEAL REFLUX DISEASE WITHOUT ESOPHAGITIS: ICD-10-CM

## 2024-03-05 RX ORDER — MELOXICAM 15 MG/1
15 TABLET ORAL DAILY PRN
Qty: 30 TABLET | Refills: 5 | Status: SHIPPED | OUTPATIENT
Start: 2024-03-05

## 2024-03-05 RX ORDER — OMEPRAZOLE 20 MG/1
20 CAPSULE, DELAYED RELEASE ORAL DAILY PRN
Qty: 30 CAPSULE | Refills: 11 | Status: SHIPPED | OUTPATIENT
Start: 2024-03-05

## 2024-03-05 RX ORDER — SILDENAFIL 100 MG/1
TABLET, FILM COATED ORAL
Qty: 3 TABLET | Refills: 4 | Status: SHIPPED | OUTPATIENT
Start: 2024-03-05

## 2024-03-05 NOTE — TELEPHONE ENCOUNTER
Marcio PARUL Bloomon called to request a refill on his medication.      Last office visit : 2/1/2024   Next office visit : 5/7/2024     Last UDS: No results found for: \"LABAMPH\", \"LABBARB\", \"LABBENZ\", \"BUPRENUR\", \"COCAIMETSCRU\", \"GABAPENTIN\", \"MDMA\", \"METAMPU\", \"OPIATESCREENURINE\", \"OXTCOSU\", \"PHENCYCLIDINESCREENURINE\", \"PROPOXYPHENE\", \"THCSCREENUR\", \"TRICYUR\"    Last Will:   Medication Contract:    Last Fill: 1/31/22    Requested Prescriptions     Pending Prescriptions Disp Refills    sildenafil (VIAGRA) 100 MG tablet 3 tablet 4     Sig: TAKE 1 TABLET BY MOUTH AS NEEDED FOR E.D.         Please approve or refuse this medication.   Gerri Hernandez, JEZN

## 2024-03-21 ENCOUNTER — OFFICE VISIT (OUTPATIENT)
Age: 69
End: 2024-03-21
Payer: COMMERCIAL

## 2024-03-21 VITALS
DIASTOLIC BLOOD PRESSURE: 80 MMHG | HEART RATE: 67 BPM | WEIGHT: 210 LBS | OXYGEN SATURATION: 96 % | SYSTOLIC BLOOD PRESSURE: 128 MMHG | RESPIRATION RATE: 20 BRPM | BODY MASS INDEX: 32.89 KG/M2 | TEMPERATURE: 98.1 F

## 2024-03-21 DIAGNOSIS — R05.9 COUGH, UNSPECIFIED TYPE: Primary | ICD-10-CM

## 2024-03-21 DIAGNOSIS — R05.1 ACUTE COUGH: ICD-10-CM

## 2024-03-21 LAB
INFLUENZA A ANTIBODY: NORMAL
INFLUENZA B ANTIBODY: NORMAL
S PYO AG THROAT QL: NORMAL

## 2024-03-21 PROCEDURE — 3079F DIAST BP 80-89 MM HG: CPT

## 2024-03-21 PROCEDURE — 87804 INFLUENZA ASSAY W/OPTIC: CPT

## 2024-03-21 PROCEDURE — G8417 CALC BMI ABV UP PARAM F/U: HCPCS

## 2024-03-21 PROCEDURE — 99213 OFFICE O/P EST LOW 20 MIN: CPT

## 2024-03-21 PROCEDURE — 3074F SYST BP LT 130 MM HG: CPT

## 2024-03-21 PROCEDURE — 3017F COLORECTAL CA SCREEN DOC REV: CPT

## 2024-03-21 PROCEDURE — G8427 DOCREV CUR MEDS BY ELIG CLIN: HCPCS

## 2024-03-21 PROCEDURE — 1123F ACP DISCUSS/DSCN MKR DOCD: CPT

## 2024-03-21 PROCEDURE — 1036F TOBACCO NON-USER: CPT

## 2024-03-21 PROCEDURE — 87880 STREP A ASSAY W/OPTIC: CPT

## 2024-03-21 PROCEDURE — G8484 FLU IMMUNIZE NO ADMIN: HCPCS

## 2024-03-21 RX ORDER — BENZONATATE 200 MG/1
200 CAPSULE ORAL 3 TIMES DAILY PRN
Qty: 30 CAPSULE | Refills: 0 | Status: SHIPPED | OUTPATIENT
Start: 2024-03-21 | End: 2024-03-28

## 2024-03-21 ASSESSMENT — ENCOUNTER SYMPTOMS
ABDOMINAL PAIN: 0
NAUSEA: 0
SHORTNESS OF BREATH: 0
DIARRHEA: 0
SORE THROAT: 0
VOMITING: 0
COUGH: 1
WHEEZING: 0

## 2024-03-21 NOTE — PATIENT INSTRUCTIONS
Flu and strep negative.  Benzonatate as needed for cough.  Honey or lemon juice in hot water or tea may ease a dry cough.   Prop up your head on pillows to help you breathe and ease a dry cough.  Try cough drops or hard candy to soothe a dry or sore throat.  Do not smoke. Avoid secondhand smoke  Cool mist humidifier while sleeping.  Return to the clinic or follow up with PCP if symptoms worsen or fail to improve.

## 2024-03-21 NOTE — PROGRESS NOTES
[facility]  [facility]  [address] [place] [address] [address]IVE  [place] [address]  Dept: [phone]  Dept Fax: [phone]  Loc: [phone]    Marcio Delarosa is a 69 y.o. male who presents today for his medical conditions/complaints as noted below.  Marcio Delarosa is c/o of Generalized Body Aches, Cough, and Fatigue        HPI:     Marcio Delarosa presents with complaints of cough. Reports Monday he was having body aches, fatigue, and congestion, reports those symptoms have since resolved. Reports a dry lingering cough. Symptoms began about 5 days ago. OTC treatment includes cough drops.      Past Medical History:   Diagnosis Date    Cirrhosis of liver without ascites (HCC) 7/17/2021    Hypertension     Joint pain     Primary osteoarthritis involving multiple joints 1/16/2023     Past Surgical History:   Procedure Laterality Date    TONSILLECTOMY         Family History   Problem Relation Age of Onset    Diabetes Mother     High Blood Pressure Mother     Heart Disease Father        Social History     Tobacco Use    Smoking status: Never    Smokeless tobacco: Never   Substance Use Topics    Alcohol use: Yes      Current Outpatient Medications   Medication Sig Dispense Refill    benzonatate (TESSALON) 200 MG capsule Take 1 capsule by mouth 3 times daily as needed for Cough 30 capsule 0    meloxicam (MOBIC) 15 MG tablet TAKE 1 TABLET BY MOUTH DAILY AS NEEDED FOR PAIN 30 tablet 5    omeprazole (PRILOSEC) 20 MG delayed release capsule TAKE 1 CAPSULE BY MOUTH DAILY AS NEEDED (GERD) 30 capsule 11    sildenafil (VIAGRA) 100 MG tablet TAKE 1 TABLET BY MOUTH AS NEEDED FOR E.D. 3 tablet 4    Misc. Devices MISC Blood pressure cuff 1 each 0    lisinopril (PRINIVIL;ZESTRIL) 40 MG tablet TAKE 1 TABLET BY MOUTH ONCE DAILY 30 tablet 5    diclofenac sodium (VOLTAREN) 1 % GEL Apply 2 g topically 2 times daily 200 g 5     No current facility-administered medications for this visit.     Allergies

## 2024-06-04 ENCOUNTER — OFFICE VISIT (OUTPATIENT)
Dept: PRIMARY CARE CLINIC | Age: 69
End: 2024-06-04

## 2024-06-04 VITALS
SYSTOLIC BLOOD PRESSURE: 136 MMHG | BODY MASS INDEX: 33.12 KG/M2 | HEIGHT: 67 IN | WEIGHT: 211 LBS | HEART RATE: 67 BPM | TEMPERATURE: 98 F | OXYGEN SATURATION: 96 % | DIASTOLIC BLOOD PRESSURE: 76 MMHG

## 2024-06-04 DIAGNOSIS — Z12.5 ENCOUNTER FOR PROSTATE CANCER SCREENING: ICD-10-CM

## 2024-06-04 DIAGNOSIS — R53.83 OTHER FATIGUE: ICD-10-CM

## 2024-06-04 DIAGNOSIS — E83.110 HEREDITARY HEMOCHROMATOSIS (HCC): ICD-10-CM

## 2024-06-04 DIAGNOSIS — I10 ESSENTIAL (PRIMARY) HYPERTENSION: ICD-10-CM

## 2024-06-04 DIAGNOSIS — Z92.29 HAS RECEIVED THIRD DOSE OF HEPATITIS B VACCINE: ICD-10-CM

## 2024-06-04 DIAGNOSIS — K74.60 CIRRHOSIS OF LIVER WITHOUT ASCITES, UNSPECIFIED HEPATIC CIRRHOSIS TYPE (HCC): ICD-10-CM

## 2024-06-04 DIAGNOSIS — Z00.00 ANNUAL PHYSICAL EXAM: Primary | ICD-10-CM

## 2024-06-04 DIAGNOSIS — Z13.220 LIPID SCREENING: ICD-10-CM

## 2024-06-04 DIAGNOSIS — K21.9 GASTROESOPHAGEAL REFLUX DISEASE WITHOUT ESOPHAGITIS: ICD-10-CM

## 2024-06-04 ASSESSMENT — PATIENT HEALTH QUESTIONNAIRE - PHQ9
SUM OF ALL RESPONSES TO PHQ QUESTIONS 1-9: 0
SUM OF ALL RESPONSES TO PHQ QUESTIONS 1-9: 0
1. LITTLE INTEREST OR PLEASURE IN DOING THINGS: NOT AT ALL
SUM OF ALL RESPONSES TO PHQ QUESTIONS 1-9: 0
2. FEELING DOWN, DEPRESSED OR HOPELESS: NOT AT ALL
SUM OF ALL RESPONSES TO PHQ QUESTIONS 1-9: 0
SUM OF ALL RESPONSES TO PHQ9 QUESTIONS 1 & 2: 0

## 2024-06-04 ASSESSMENT — LIFESTYLE VARIABLES
HOW OFTEN DURING THE LAST YEAR HAVE YOU HAD A FEELING OF GUILT OR REMORSE AFTER DRINKING: NEVER
HOW OFTEN DURING THE LAST YEAR HAVE YOU BEEN UNABLE TO REMEMBER WHAT HAPPENED THE NIGHT BEFORE BECAUSE YOU HAD BEEN DRINKING: NEVER
HOW MANY STANDARD DRINKS CONTAINING ALCOHOL DO YOU HAVE ON A TYPICAL DAY: 1 OR 2
HOW OFTEN DO YOU HAVE A DRINK CONTAINING ALCOHOL: 2-3 TIMES A WEEK
HAVE YOU OR SOMEONE ELSE BEEN INJURED AS A RESULT OF YOUR DRINKING: NO
HAS A RELATIVE, FRIEND, DOCTOR, OR ANOTHER HEALTH PROFESSIONAL EXPRESSED CONCERN ABOUT YOUR DRINKING OR SUGGESTED YOU CUT DOWN: NO
HOW OFTEN DURING THE LAST YEAR HAVE YOU FAILED TO DO WHAT WAS NORMALLY EXPECTED FROM YOU BECAUSE OF DRINKING: NEVER
HOW OFTEN DURING THE LAST YEAR HAVE YOU FOUND THAT YOU WERE NOT ABLE TO STOP DRINKING ONCE YOU HAD STARTED: NEVER
HOW OFTEN DURING THE LAST YEAR HAVE YOU NEEDED AN ALCOHOLIC DRINK FIRST THING IN THE MORNING TO GET YOURSELF GOING AFTER A NIGHT OF HEAVY DRINKING: NEVER

## 2024-06-04 NOTE — PATIENT INSTRUCTIONS
Check the following website to choose validated blood pressure cuffs:  https://www.validatebp.org/    How to Check Blood Pressure

## 2024-06-04 NOTE — PROGRESS NOTES
[facility]  [address]  [address]  [place] [address]  Dept: [phone]  Dept Fax: [phone]  Loc: [phone]    Marcio Delarosa is a 69 y.o. male who presents today for his medical conditions/complaints as noted below.  Marcio Delarosa is here for Medicare AWV and Knee Pain (Bilat knee)    Patient History:     Hemochromatosis with cirrhosis.  -[facility] gastroenterology  - -Receives therapeutic phlebotomies.  -Ultrasound liver: June 2023:The liver is cirrhotic in morphology with scalloped contours in the   left lobe and heterogeneous echotexture. No evidence of focal hepatic   mass   - Ultrasound elastography: Metavir Score F>/=3:Moderate-Severe     pthx         Subjective:   CC:  Here today to discuss the following:    Hypertension  Compliant with medications.  No adverse effects from medication.  No lightheadedness, palpitations, or chest pain.      Gastroesophageal Reflux Disease  Symptoms currently under control.   Medication adequately controls symptoms.  No hematochezia or melena.     Review of Systems   Constitutional: Negative for chills and fever.   HENT: Negative for congestion.    Respiratory: Negative for cough, chest tightness and shortness of breath.  Cardiovascular: Negative for chest pain, palpitations and leg swelling.   Gastrointestinal: Negative for abdominal pain, anal bleeding, constipation, diarrhea and nausea.       Review of Systems  Objective:   /76   Pulse 67   Temp 98 °F (36.7 °C)   Ht 1.702 m (5' 7\")   Wt 95.7 kg (211 lb)   SpO2 96%   BMI 33.05 kg/m²   BP Readings from Last 3 Encounters:   06/04/24 136/76   03/21/24 128/80   02/01/24 (!) 148/100    Wt Readings from Last 3 Encounters:   06/04/24 95.7 kg (211 lb)   03/21/24 95.3 kg (210 lb)   02/01/24 96.2 kg (212 lb)         Physical Exam   Constitutional: He appears well. Does not appear ill.   Neck: Neck supple. No masses.  Neck Symmetric.  Normal tracheal position.

## 2024-06-04 NOTE — PROGRESS NOTES
Medicare Annual Wellness Visit - Subsequent    Name: Marcio Delarosa Today’s Date: 2024   MRN: [id number] Sex: Male   Age: 69 y.o. Ethnicity: Non- / Non    : 1955 Race: White (non-)      Marcio Delarosa is here for   Chief Complaint   Patient presents with    Medicare AWV    Knee Pain     Bilat knee        Screenings for behavioral, psychosocial and functional/safety risks, and cognitive dysfunction are all negative except as indicated below. These results, as well as other patient data from the Health Risk Assessment form, are documented in Flowsheets linked to this Encounter.    Allergies   Allergen Reactions    Codeine        Prior to Visit Medications    Medication Sig Taking? Authorizing Provider   meloxicam (MOBIC) 15 MG tablet TAKE 1 TABLET BY MOUTH DAILY AS NEEDED FOR PAIN Yes Sam Huynh MD   omeprazole (PRILOSEC) 20 MG delayed release capsule TAKE 1 CAPSULE BY MOUTH DAILY AS NEEDED (GERD) Yes Sam Huynh MD   sildenafil (VIAGRA) 100 MG tablet TAKE 1 TABLET BY MOUTH AS NEEDED FOR E.D. Yes Sam Huynh MD   Misc. Devices MISC Blood pressure cuff Yes Sam Huynh MD   lisinopril (PRINIVIL;ZESTRIL) 40 MG tablet TAKE 1 TABLET BY MOUTH ONCE DAILY Yes Sam Huynh MD   diclofenac sodium (VOLTAREN) 1 % GEL Apply 2 g topically 2 times daily Yes Sam Huynh MD         Past Medical History:   Diagnosis Date    Cirrhosis of liver without ascites (HCC) 2021    Hypertension     Joint pain     Primary osteoarthritis involving multiple joints 2023         Past Surgical History:   Procedure Laterality Date    TONSILLECTOMY         Family History   Problem Relation Age of Onset    Diabetes Mother     High Blood Pressure Mother     Heart Disease Father        CareTeam (Including outside providers/suppliers regularly involved in providing care):   Patient Care Team:  Sam Huynh MD as PCP - General (Family Medicine)  Sam Huynh MD as PCP -  30-Apr-2022 10:55

## 2024-06-12 NOTE — PROGRESS NOTES
Primary Physician: Ermias Monteiro MD    Chief Complaint   Patient presents with    Follow-up     Pt presents today for cirrhosis and hereditary hemochromatosis follow up-states he is feeling good        Subjective     Frankie Reid is a 69 y.o. male.    HPI  Genetic hemochromatosis with progression to cirrhosis  Patient had CTA at [facility] 4/5/2021 which showed nodular changes of the liver concerning for cirrhosis.  LFTs have been normal.  Ferritin in the 700 range. He is homozygous for H63D mutation.  No family history of liver disease.  He was drinking 1-2 cases of beer a week or 1/5th whiskey a week.  He stopped alcohol on April 4, 2021, but he has resumed a beer about 2x/week.  In 12/2022 about a beer a day.  In 6/2023 he is drinking about 3 beers/week.  Even less in 12/2023--about 2 beers/month and still at this range in 6/2024.     Sonogram of the liver with elastography 6/2021 shows F3/F4 status.  No masses.  Endoscopy 2021 does not show varices.  Ferritin in correct range starting 9/2021 after a series of phlebotomy.   Was on every 3 month cycle in 2021 and this was changed to every 6 months in 2022.  Last few phleb 2/2023 and 8/2023.  Ferritin 8/2023 136.  Hb stable. Phlebotomy 2/2024--ferritin 130.  Started the vaccine for Hep B in Nov 2023 and he completed it in June 2024.     Vitamin D insufficiency  Vitamin D level just below normal 12/2022.  He was taking vitamin D 5000 units daily, but stopped it somewhere along the way.  Level normal 7/2023.      Gastric intestinal metaplasia with h/o gastric ulcers  Endoscopy 9/2021 to follow-up on gastric ulcer from 7/2021 showed antral gastric intestinal metaplasia.  He had previously had H pylori in July which was treated and then found to be cured.  Metaplasia gastric mapping via biopsy done 5/2022.  No neoplasia noted.  Plan to repeat endoscopy 5/2025.     Adenomatous colon polyps and family hx colon polyps  Adenomas were removed in 2012 and  2017.  No adenomas 2/2020.  He will need a repeat colonoscopy in 2/2025.  His sister also has had colon polyps less than 60 years old.      Past Medical History:   Diagnosis Date    Arthritis     Diverticulosis     ED (erectile dysfunction)     Family history of colonic polyps     GERD (gastroesophageal reflux disease)     Hereditary hemochromatosis     History of adenomatous polyp of colon     History of colon polyps     Hypertension     Other cirrhosis of liver        Past Surgical History:   Procedure Laterality Date    COLONOSCOPY N/A 02/10/2017    One 7mm sessile serated adenomatous polyp in the ascending colon; Two 5-7mm adenomatous polyps in the transverse colon; One 4mm hyperplastic polyp in the rectum; Repeat 3 years    COLONOSCOPY  10/25/2012    One 6mm adenomatous polyp in the sigmoid colon; Repeat 4 years    COLONOSCOPY N/A 02/24/2020    Diverticulosis in the left colon; The examination was otherwise normal on direct and retroflexion views; No specimens collected; Repeat 5 years    ENDOSCOPY N/A 07/19/2021    Non-bleeding gastric ulcer with no stigmata of bleeding-biopsied; Non-bleeding erosive gastropathy; Normal examined duodenum; Repeat 2 months    ENDOSCOPY N/A 09/20/2021    Small HH; Erythematous mucosa in the antrum-biopsied; Normal examined duodenum    ENDOSCOPY N/A 05/04/2022    LA Grade A reflux esophagitis; Erythematous mucosa in the antrum-biopsied; Normal examined duodenum    MULTIPLE TOOTH EXTRACTIONS      TONSILLECTOMY          Current Outpatient Medications:     lisinopril (PRINIVIL,ZESTRIL) 20 MG tablet, Take 1 tablet by mouth Daily., Disp: , Rfl:     meloxicam (MOBIC) 15 MG tablet, Take 1 tablet by mouth Daily., Disp: , Rfl:     omeprazole (priLOSEC) 20 MG capsule, Take 1 capsule by mouth Daily., Disp: , Rfl:     Allergies   Allergen Reactions    Codeine Nausea And Vomiting       Social History     Socioeconomic History    Marital status:    Tobacco Use    Smoking status: Never  "   Smokeless tobacco: Never   Vaping Use    Vaping status: Never Used   Substance and Sexual Activity    Alcohol use: Not Currently    Drug use: No    Sexual activity: Defer       Family History   Problem Relation Age of Onset    Colon polyps Sister         < 60 years of age    No Known Problems Mother     Heart disease Father     Colon cancer Neg Hx     Esophageal cancer Neg Hx     Liver cancer Neg Hx     Liver disease Neg Hx     Rectal cancer Neg Hx     Stomach cancer Neg Hx        Review of Systems   Respiratory:  Negative for shortness of breath.    Cardiovascular:  Negative for chest pain.       Objective     /74 (BP Location: Left arm, Patient Position: Sitting, Cuff Size: Adult)   Pulse 55   Temp 98 °F (36.7 °C) (Infrared)   Ht 170.2 cm (67\")   Wt 93.9 kg (207 lb)   SpO2 95%   BMI 32.42 kg/m²     Physical Exam  Constitutional:       Appearance: He is well-developed.   Pulmonary:      Effort: Pulmonary effort is normal.   Musculoskeletal:         General: Normal range of motion.   Skin:     General: Skin is warm.   Neurological:      Mental Status: He is alert and oriented to person, place, and time.   Psychiatric:         Behavior: Behavior normal.         Lab Results - Last 18 Months   Lab Units 06/07/23  1423   GLUCOSE mg/dL 84   BUN mg/dL 14   CREATININE mg/dL 1.12   SODIUM mmol/L 141   POTASSIUM mmol/L 4.1   CHLORIDE mmol/L 106   CO2 mmol/L 26.0   TOTAL PROTEIN g/dL 7.5   ALBUMIN g/dL 4.0   ALT (SGPT) U/L 30   AST (SGOT) U/L 28   ALK PHOS U/L 99   BILIRUBIN mg/dL 0.5   GLOBULIN gm/dL 3.5   ALPHA-FETOPROTEIN ng/mL 3.14       Lab Results - Last 18 Months   Lab Units 02/27/24  0909 08/03/23  0807 07/13/23  0738 06/07/23  1423 02/07/23  0937   HEMOGLOBIN g/dL 15.9 16.3 16.4 16.2 16.2   HEMATOCRIT % 45.9 48.4 48.0 48.9 48.6   MCV fL 95.4 96.0 98.0* 95.1 97.8*   WBC 10*3/mm3 10.74 9.54 9.2 8.49 8.56   RDW % 12.5 12.7 13.2 12.9 12.5   MPV fL 12.1* 12.1* 12.0 12.4* 12.0   PLATELETS 10*3/mm3 107* 122* " 142 121* 118*   INR   --   --   --  0.97  --        Lab Results - Last 18 Months   Lab Units 02/27/24  0909 08/03/23  0807 07/28/23  1247 07/13/23  0738 06/07/23  1423 02/07/23  0937   IRON ug/dL  --   --  174*  --   --   --    FERRITIN ng/mL 129.50 136.60 149.1  --   --  113.40   VIT D 25 HYDROXY ng/mL  --   --   --  34.5   < >  --     < > = values in this interval not displayed.        US LIVER-6/27/2023 8:26 AM CDT     REASON FOR EXAM: cirrhosis--eval uate for hepatoma; K74.69-Other  cirrhosis of liver       COMPARISON: NONE      TECHNIQUE: Multiple longitudinal and transverse realtime sonographic  images of the right upper quadrant of the abdomen are obtained.      FINDINGS:    Pancreas: Obscured by overlying bowel gas..      Liver: The liver is cirrhotic in morphology with a scalloped contour of  the left lobe. The echotexture is heterogeneous but with no evidence of  a focal hepatic mass. There is hepatopedal flow within the portal vein..        Gallbladder: No intraluminal echoes, wall thickening, or pericholecystic  fluid.      Bile ducts: The CBD measures 0.39 cm in diameter. There is no  intrahepatic or extrahepatic ductal dilation.      Right kidney: Normal in size, shape and contour. No mass, hydronephrosis  or calculi. No perinephric fluid collection.       Other: The visualized abdominal aorta is normal in caliber.      IMPRESSION:     1. The liver is cirrhotic in morphology with scalloped contours in the  left lobe and heterogeneous echotexture. No evidence of focal hepatic  mass. Color Doppler demonstrates hepatopedal flow within the portal  vein.  2. Normal gallbladder. No biliary dilatation is present.        This report was finalized on 06/27/2023 10:11 by Dr. Neno Ma MD.    IMPRESSION/PLAN:    Assessment & Plan      Problem List Items Addressed This Visit          Coag and Thromboembolic    Thrombocytopenia    Overview     Likely related to advancing liver disease.  Plt in the low 100K  range.         Relevant Orders    CBC Auto Differential       Endocrine and Metabolic    Hereditary hemochromatosis    Overview     Homozygous for H63D.  Ferritin 700 range originally and corrected with phlebotomy. Pt aware that hereditary hemochromatosis is an independent risk factor for hepatocellular carcinoma.  He will need laboratories and serial sonograms of liver done on a regular basis.    Ferritin in correct range starting 9/2021 after a series of phlebotomy.  Was on every 3 month cycle in 2021.  Has been on every 6 month cycle 2022 and ferritins are holding in acceptable range.  Phleb done 2/2023, 8/2023, 2/2024.         Relevant Orders    US Liver    Vitamin D insufficiency    Overview     Vitamin D level slightly low 12/2022.  He was taking vitamin D 5000 units daily, but stopped it somewhere along the way.  Level normal 7/2023.  He knows to resume it.  He is taking 5000 U daily. REcheck level.         Relevant Orders    Vitamin D,25-Hydroxy       Family History    Family history of polyps in the colon    Overview     Sister had colon polyps less than 60 years old.            Gastrointestinal Abdominal     History of adenomatous polyp of colon    Overview     Adenomatous colon polyps were removed in 2012 and 2017.  Last colonoscopy 2/2020 unremarkable.  He will be due for repeat colonoscopy in 2/2025.         Other cirrhosis of liver - Primary    Overview     CTA of the chest shows nodular liver suggestive of cirrhosis.  Sono imaging 7/2022, 1/2023 and 6/2023 suggestive of cirrhosis.  LFTs are normal.  Full serological evaluation unremarkable with exception of a ferritin in the 700 range with increased iron saturation. Homozygous for H63D mutation.  Patient is at risk for hepatoma formation given cirrhosis; will need to have serial sono of the liver and AFP Measurements every 6-12 months going forward.      Fib 4 7/22 = 2.46   Fib 4 6/2023 = 2.79    Fib-4 scoring system  Points <1.45:                       Cirrhosis less likely  Points ?1.45 and ?3.25:       Indeterminate  Points >3.25:                      Cirrhosis more likely    Elastography 6/2021 = F3.    MELD 12/2022 = 8  MELD 6/2023 = 8    No varices on endoscopy 2021 and again in 2022.    Coffee consumption has been shown to decrease the risk of HCC in patients with chronic liver disease.  In these patients, coffee consumption should be encouraged.  He is now drinking 2 to 3 cups daily with breakfast.    The patient was advised to stay active as possible and exercise as tolerated on a regular basis.  This is to pope off muscle wasting.  Chronic liver disease is a catabolic condition that will likely result in loss of muscle mass. Sarcopenia is associated with 2.2 fold increased risk of death and is more predictive of death than well-accepted indicaors of liver function like MELD and CPT.    The best way of slowing this process is to exercise regularly as much as possible as well as to ingest a moderate amount of dietary protein, at least 1.0-1.5mg/kg.  The patient should have at least 3 servings of protein daily.  Daily caloric intake should be 25-40kcal/kg/day.    These patients are also at risk for osteoporosis and vitamin D deficiency.  Jacinda D level slightly low--on supplement with normalization of level.  Sarcopenia worsens bone formation.  If evidence of osteoporosis, then treatment with biphosphonates and calcium are safe and recommended.    Fluid restriction should only take place if there is significant hyponatremia.    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    Pt. also advised to take multivitamin daily.    Vaccines are important as well:    All adults need yearly vaccines against influenza    Twinrix vaccination completed--immune to Hep A , but not Hep B.  Recombivax started 11/2023.    All patients should receive pneumococcal vaccines--both the PCV13 and the PPSV23.  Pneumococcus PCV 13 (1 dose only, 12 months apart from  PPSV 23),  and Pneumococcus PPSV 23 (every 5 years, for 2 doses and third dose at age 65)    These vaccines can be obtained through the patients PCP or alternatively, they can be given at [facility] without a prescription.         Relevant Orders    AFP Tumor Marker    CBC Auto Differential    Comprehensive Metabolic Panel    Protime-INR    US Liver    Intestinal metaplasia of gastric mucosa    Overview     Found on endoscopy 2021.  Mapping done 5/2022 and intestinal metaplasia found involving the body of the stomach.  The antrum was spared.  H pylori has already been eradicated.  Will need repeat endoscopy 5/2025.    Will try to do both endoscopy and colonoscopy at the same time.  His colonoscopy is due in February 2025; endoscopy in 5/2025.  Will try make arrangements for both.            Other    Hepatitis A immune    Overview     Labs confirm immunity 12/2022.         Nonimmune to hepatitis B virus    Overview     He is immune to hepatitis A.  He is not immune to hepatitis B.  Started the vaccine for Hep B in Nov 2023 and he just completed idn 6/2024.  Will check for antibodies in near future.              MEDICAL COMPLEXITY must have 2 out of 3   Moderate Complexity Level 4                                                                                                              1 of the following medical problems:                                                                                               []One chronic illness with mild exacerbation                                                                                [x]Two or more stable chronic illness                                                                                              []One new problem  []One acute illness with systemic symptoms     Complexity of Data  Reviewed (1 out of the 3 following categories)                                             Category 1 tests, documents, historian (must have 3 points)                                                       []Review of prior external records  []Review of results of unique tests  [x]Ordering unique tests   []Assessment requires an independent historian   Category 2 Interpretation of tests     []Independent interpretation of test read by another doc   Category 3 Discuss Management/tests  []Discussion with external physician     Risk of complications and/or morbidity                                                                                           []Prescription Drug Management     []Decision for elective endoscopic procedure              RTC 6 months      Dorothea Sales MD  06/13/24  14:08 CDT    Part of this note may be an electronic transcription/translation of spoken language to printed text.

## 2024-06-13 ENCOUNTER — LAB (OUTPATIENT)
Dept: LAB | Facility: HOSPITAL | Age: 69
End: 2024-06-13
Payer: COMMERCIAL

## 2024-06-13 ENCOUNTER — OFFICE VISIT (OUTPATIENT)
Dept: GASTROENTEROLOGY | Facility: CLINIC | Age: 69
End: 2024-06-13
Payer: COMMERCIAL

## 2024-06-13 VITALS
WEIGHT: 207 LBS | HEIGHT: 67 IN | HEART RATE: 55 BPM | DIASTOLIC BLOOD PRESSURE: 74 MMHG | TEMPERATURE: 98 F | SYSTOLIC BLOOD PRESSURE: 132 MMHG | BODY MASS INDEX: 32.49 KG/M2 | OXYGEN SATURATION: 95 %

## 2024-06-13 DIAGNOSIS — Z78.9 HEPATITIS A IMMUNE: ICD-10-CM

## 2024-06-13 DIAGNOSIS — K31.A0 INTESTINAL METAPLASIA OF GASTRIC MUCOSA: ICD-10-CM

## 2024-06-13 DIAGNOSIS — Z83.719 FAMILY HISTORY OF POLYPS IN THE COLON: ICD-10-CM

## 2024-06-13 DIAGNOSIS — E55.9 VITAMIN D INSUFFICIENCY: ICD-10-CM

## 2024-06-13 DIAGNOSIS — K74.69 OTHER CIRRHOSIS OF LIVER: ICD-10-CM

## 2024-06-13 DIAGNOSIS — K74.69 OTHER CIRRHOSIS OF LIVER: Primary | ICD-10-CM

## 2024-06-13 DIAGNOSIS — D69.6 THROMBOCYTOPENIA: ICD-10-CM

## 2024-06-13 DIAGNOSIS — Z78.9 NONIMMUNE TO HEPATITIS B VIRUS: ICD-10-CM

## 2024-06-13 DIAGNOSIS — Z86.010 HISTORY OF ADENOMATOUS POLYP OF COLON: ICD-10-CM

## 2024-06-13 DIAGNOSIS — E83.110 HEREDITARY HEMOCHROMATOSIS: ICD-10-CM

## 2024-06-13 LAB
ALBUMIN SERPL-MCNC: 3.9 G/DL (ref 3.5–5.2)
ALBUMIN/GLOB SERPL: 1.2 G/DL
ALP SERPL-CCNC: 101 U/L (ref 39–117)
ALT SERPL W P-5'-P-CCNC: 25 U/L (ref 1–41)
ANION GAP SERPL CALCULATED.3IONS-SCNC: 11 MMOL/L (ref 5–15)
AST SERPL-CCNC: 27 U/L (ref 1–40)
BASOPHILS # BLD MANUAL: 0 10*3/MM3 (ref 0–0.2)
BASOPHILS NFR BLD MANUAL: 0 % (ref 0–1.5)
BILIRUB SERPL-MCNC: 0.8 MG/DL (ref 0–1.2)
BUN SERPL-MCNC: 16 MG/DL (ref 8–23)
BUN/CREAT SERPL: 13 (ref 7–25)
CALCIUM SPEC-SCNC: 9 MG/DL (ref 8.6–10.5)
CHLORIDE SERPL-SCNC: 103 MMOL/L (ref 98–107)
CO2 SERPL-SCNC: 26 MMOL/L (ref 22–29)
CREAT SERPL-MCNC: 1.23 MG/DL (ref 0.76–1.27)
DEPRECATED RDW RBC AUTO: 43.5 FL (ref 37–54)
EGFRCR SERPLBLD CKD-EPI 2021: 64.3 ML/MIN/1.73
EOSINOPHIL # BLD MANUAL: 0 10*3/MM3 (ref 0–0.4)
EOSINOPHIL NFR BLD MANUAL: 0 % (ref 0.3–6.2)
ERYTHROCYTE [DISTWIDTH] IN BLOOD BY AUTOMATED COUNT: 12.6 % (ref 12.3–15.4)
GIANT PLATELETS: ABNORMAL
GLOBULIN UR ELPH-MCNC: 3.2 GM/DL
GLUCOSE SERPL-MCNC: 158 MG/DL (ref 65–99)
HCT VFR BLD AUTO: 44.4 % (ref 37.5–51)
HGB BLD-MCNC: 15.4 G/DL (ref 13–17.7)
INR PPP: 1 (ref 0.91–1.09)
LYMPHOCYTES # BLD MANUAL: 6.87 10*3/MM3 (ref 0.7–3.1)
LYMPHOCYTES NFR BLD MANUAL: 4 % (ref 5–12)
MCH RBC QN AUTO: 32.8 PG (ref 26.6–33)
MCHC RBC AUTO-ENTMCNC: 34.7 G/DL (ref 31.5–35.7)
MCV RBC AUTO: 94.5 FL (ref 79–97)
MONOCYTES # BLD: 0.44 10*3/MM3 (ref 0.1–0.9)
NEUTROPHILS # BLD AUTO: 3.6 10*3/MM3 (ref 1.7–7)
NEUTROPHILS NFR BLD MANUAL: 32 % (ref 42.7–76)
NEUTS BAND NFR BLD MANUAL: 1 % (ref 0–5)
PLATELET # BLD AUTO: 115 10*3/MM3 (ref 140–450)
PMV BLD AUTO: 12.6 FL (ref 6–12)
POTASSIUM SERPL-SCNC: 3.5 MMOL/L (ref 3.5–5.2)
PROT SERPL-MCNC: 7.1 G/DL (ref 6–8.5)
PROTHROMBIN TIME: 13.6 SECONDS (ref 11.8–14.8)
RBC # BLD AUTO: 4.7 10*6/MM3 (ref 4.14–5.8)
RBC MORPH BLD: NORMAL
SMALL PLATELETS BLD QL SMEAR: ABNORMAL
SMUDGE CELLS BLD QL SMEAR: ABNORMAL
SODIUM SERPL-SCNC: 140 MMOL/L (ref 136–145)
VARIANT LYMPHS NFR BLD MANUAL: 63 % (ref 19.6–45.3)
WBC NRBC COR # BLD AUTO: 10.91 10*3/MM3 (ref 3.4–10.8)

## 2024-06-13 PROCEDURE — 85610 PROTHROMBIN TIME: CPT

## 2024-06-13 PROCEDURE — 85025 COMPLETE CBC W/AUTO DIFF WBC: CPT

## 2024-06-13 PROCEDURE — 82105 ALPHA-FETOPROTEIN SERUM: CPT

## 2024-06-13 PROCEDURE — 85007 BL SMEAR W/DIFF WBC COUNT: CPT

## 2024-06-13 PROCEDURE — 80053 COMPREHEN METABOLIC PANEL: CPT

## 2024-06-13 PROCEDURE — 36415 COLL VENOUS BLD VENIPUNCTURE: CPT

## 2024-06-13 PROCEDURE — 82306 VITAMIN D 25 HYDROXY: CPT

## 2024-06-13 NOTE — LETTER
June 13, 2024       No Recipients    Patient: Frankie Reid   YOB: 1955   Date of Visit: 6/13/2024     Dear Ermias Monteiro MD:       Thank you for referring Frankie Reid to me for evaluation. Below are the relevant portions of my assessment and plan of care.    If you have questions, please do not hesitate to call me. I look forward to following Frankie along with you.         Sincerely,        Dorothea Sales MD        CC:   No Recipients    Dorothea Sales MD  06/13/24 1410  Sign when Signing Visit  Primary Physician: Ermias Monteiro MD    Chief Complaint   Patient presents with   • Follow-up     Pt presents today for cirrhosis and hereditary hemochromatosis follow up-states he is feeling good        Subjective    Frankie Reid is a 69 y.o. male.    HPI  Genetic hemochromatosis with progression to cirrhosis  Patient had CTA at [facility] 4/5/2021 which showed nodular changes of the liver concerning for cirrhosis.  LFTs have been normal.  Ferritin in the 700 range. He is homozygous for H63D mutation.  No family history of liver disease.  He was drinking 1-2 cases of beer a week or 1/5th whiskey a week.  He stopped alcohol on April 4, 2021, but he has resumed a beer about 2x/week.  In 12/2022 about a beer a day.  In 6/2023 he is drinking about 3 beers/week.  Even less in 12/2023--about 2 beers/month and still at this range in 6/2024.     Sonogram of the liver with elastography 6/2021 shows F3/F4 status.  No masses.  Endoscopy 2021 does not show varices.  Ferritin in correct range starting 9/2021 after a series of phlebotomy.   Was on every 3 month cycle in 2021 and this was changed to every 6 months in 2022.  Last few phleb 2/2023 and 8/2023.  Ferritin 8/2023 136.  Hb stable. Phlebotomy 2/2024--ferritin 130.  Started the vaccine for Hep B in Nov 2023 and he completed it in June 2024.     Vitamin D insufficiency  Vitamin D level just below normal 12/2022.  He was taking vitamin D 5000  units daily, but stopped it somewhere along the way.  Level normal 7/2023.      Gastric intestinal metaplasia with h/o gastric ulcers  Endoscopy 9/2021 to follow-up on gastric ulcer from 7/2021 showed antral gastric intestinal metaplasia.  He had previously had H pylori in July which was treated and then found to be cured.  Metaplasia gastric mapping via biopsy done 5/2022.  No neoplasia noted.  Plan to repeat endoscopy 5/2025.     Adenomatous colon polyps and family hx colon polyps  Adenomas were removed in 2012 and 2017.  No adenomas 2/2020.  He will need a repeat colonoscopy in 2/2025.  His sister also has had colon polyps less than 60 years old.      Past Medical History:   Diagnosis Date   • Arthritis    • Diverticulosis    • ED (erectile dysfunction)    • Family history of colonic polyps    • GERD (gastroesophageal reflux disease)    • Hereditary hemochromatosis    • History of adenomatous polyp of colon    • History of colon polyps    • Hypertension    • Other cirrhosis of liver        Past Surgical History:   Procedure Laterality Date   • COLONOSCOPY N/A 02/10/2017    One 7mm sessile serated adenomatous polyp in the ascending colon; Two 5-7mm adenomatous polyps in the transverse colon; One 4mm hyperplastic polyp in the rectum; Repeat 3 years   • COLONOSCOPY  10/25/2012    One 6mm adenomatous polyp in the sigmoid colon; Repeat 4 years   • COLONOSCOPY N/A 02/24/2020    Diverticulosis in the left colon; The examination was otherwise normal on direct and retroflexion views; No specimens collected; Repeat 5 years   • ENDOSCOPY N/A 07/19/2021    Non-bleeding gastric ulcer with no stigmata of bleeding-biopsied; Non-bleeding erosive gastropathy; Normal examined duodenum; Repeat 2 months   • ENDOSCOPY N/A 09/20/2021    Small HH; Erythematous mucosa in the antrum-biopsied; Normal examined duodenum   • ENDOSCOPY N/A 05/04/2022    LA Grade A reflux esophagitis; Erythematous mucosa in the antrum-biopsied; Normal examined  "duodenum   • MULTIPLE TOOTH EXTRACTIONS     • TONSILLECTOMY          Current Outpatient Medications:   •  lisinopril (PRINIVIL,ZESTRIL) 20 MG tablet, Take 1 tablet by mouth Daily., Disp: , Rfl:   •  meloxicam (MOBIC) 15 MG tablet, Take 1 tablet by mouth Daily., Disp: , Rfl:   •  omeprazole (priLOSEC) 20 MG capsule, Take 1 capsule by mouth Daily., Disp: , Rfl:     Allergies   Allergen Reactions   • Codeine Nausea And Vomiting       Social History     Socioeconomic History   • Marital status:    Tobacco Use   • Smoking status: Never   • Smokeless tobacco: Never   Vaping Use   • Vaping status: Never Used   Substance and Sexual Activity   • Alcohol use: Not Currently   • Drug use: No   • Sexual activity: Defer       Family History   Problem Relation Age of Onset   • Colon polyps Sister         < 60 years of age   • No Known Problems Mother    • Heart disease Father    • Colon cancer Neg Hx    • Esophageal cancer Neg Hx    • Liver cancer Neg Hx    • Liver disease Neg Hx    • Rectal cancer Neg Hx    • Stomach cancer Neg Hx        Review of Systems   Respiratory:  Negative for shortness of breath.    Cardiovascular:  Negative for chest pain.       Objective    /74 (BP Location: Left arm, Patient Position: Sitting, Cuff Size: Adult)   Pulse 55   Temp 98 °F (36.7 °C) (Infrared)   Ht 170.2 cm (67\")   Wt 93.9 kg (207 lb)   SpO2 95%   BMI 32.42 kg/m²     Physical Exam  Constitutional:       Appearance: He is well-developed.   Pulmonary:      Effort: Pulmonary effort is normal.   Musculoskeletal:         General: Normal range of motion.   Skin:     General: Skin is warm.   Neurological:      Mental Status: He is alert and oriented to person, place, and time.   Psychiatric:         Behavior: Behavior normal.         Lab Results - Last 18 Months   Lab Units 06/07/23  1423   GLUCOSE mg/dL 84   BUN mg/dL 14   CREATININE mg/dL 1.12   SODIUM mmol/L 141   POTASSIUM mmol/L 4.1   CHLORIDE mmol/L 106   CO2 mmol/L 26.0 "   TOTAL PROTEIN g/dL 7.5   ALBUMIN g/dL 4.0   ALT (SGPT) U/L 30   AST (SGOT) U/L 28   ALK PHOS U/L 99   BILIRUBIN mg/dL 0.5   GLOBULIN gm/dL 3.5   ALPHA-FETOPROTEIN ng/mL 3.14       Lab Results - Last 18 Months   Lab Units 02/27/24  0909 08/03/23  0807 07/13/23  0738 06/07/23  1423 02/07/23  0937   HEMOGLOBIN g/dL 15.9 16.3 16.4 16.2 16.2   HEMATOCRIT % 45.9 48.4 48.0 48.9 48.6   MCV fL 95.4 96.0 98.0* 95.1 97.8*   WBC 10*3/mm3 10.74 9.54 9.2 8.49 8.56   RDW % 12.5 12.7 13.2 12.9 12.5   MPV fL 12.1* 12.1* 12.0 12.4* 12.0   PLATELETS 10*3/mm3 107* 122* 142 121* 118*   INR   --   --   --  0.97  --        Lab Results - Last 18 Months   Lab Units 02/27/24  0909 08/03/23  0807 07/28/23  1247 07/13/23  0738 06/07/23  1423 02/07/23  0937   IRON ug/dL  --   --  174*  --   --   --    FERRITIN ng/mL 129.50 136.60 149.1  --   --  113.40   VIT D 25 HYDROXY ng/mL  --   --   --  34.5   < >  --     < > = values in this interval not displayed.        US LIVER-6/27/2023 8:26 AM CDT     REASON FOR EXAM: cirrhosis--eval uate for hepatoma; K74.69-Other  cirrhosis of liver       COMPARISON: NONE      TECHNIQUE: Multiple longitudinal and transverse realtime sonographic  images of the right upper quadrant of the abdomen are obtained.      FINDINGS:    Pancreas: Obscured by overlying bowel gas..      Liver: The liver is cirrhotic in morphology with a scalloped contour of  the left lobe. The echotexture is heterogeneous but with no evidence of  a focal hepatic mass. There is hepatopedal flow within the portal vein..        Gallbladder: No intraluminal echoes, wall thickening, or pericholecystic  fluid.      Bile ducts: The CBD measures 0.39 cm in diameter. There is no  intrahepatic or extrahepatic ductal dilation.      Right kidney: Normal in size, shape and contour. No mass, hydronephrosis  or calculi. No perinephric fluid collection.       Other: The visualized abdominal aorta is normal in caliber.      IMPRESSION:     1. The liver is  cirrhotic in morphology with scalloped contours in the  left lobe and heterogeneous echotexture. No evidence of focal hepatic  mass. Color Doppler demonstrates hepatopedal flow within the portal  vein.  2. Normal gallbladder. No biliary dilatation is present.        This report was finalized on 06/27/2023 10:11 by Dr. Neno Ma MD.    IMPRESSION/PLAN:    Assessment & Plan     Problem List Items Addressed This Visit          Coag and Thromboembolic    Thrombocytopenia    Overview     Likely related to advancing liver disease.  Plt in the low 100K range.         Relevant Orders    CBC Auto Differential       Endocrine and Metabolic    Hereditary hemochromatosis    Overview     Homozygous for H63D.  Ferritin 700 range originally and corrected with phlebotomy. Pt aware that hereditary hemochromatosis is an independent risk factor for hepatocellular carcinoma.  He will need laboratories and serial sonograms of liver done on a regular basis.    Ferritin in correct range starting 9/2021 after a series of phlebotomy.  Was on every 3 month cycle in 2021.  Has been on every 6 month cycle 2022 and ferritins are holding in acceptable range.  Phleb done 2/2023, 8/2023, 2/2024.         Relevant Orders    US Liver    Vitamin D insufficiency    Overview     Vitamin D level slightly low 12/2022.  He was taking vitamin D 5000 units daily, but stopped it somewhere along the way.  Level normal 7/2023.  He knows to resume it.  He is taking 5000 U daily. REcheck level.         Relevant Orders    Vitamin D,25-Hydroxy       Family History    Family history of polyps in the colon    Overview     Sister had colon polyps less than 60 years old.            Gastrointestinal Abdominal     History of adenomatous polyp of colon    Overview     Adenomatous colon polyps were removed in 2012 and 2017.  Last colonoscopy 2/2020 unremarkable.  He will be due for repeat colonoscopy in 2/2025.         Other cirrhosis of liver - Primary    Overview      CTA of the chest shows nodular liver suggestive of cirrhosis.  Sono imaging 7/2022, 1/2023 and 6/2023 suggestive of cirrhosis.  LFTs are normal.  Full serological evaluation unremarkable with exception of a ferritin in the 700 range with increased iron saturation. Homozygous for H63D mutation.  Patient is at risk for hepatoma formation given cirrhosis; will need to have serial sono of the liver and AFP Measurements every 6-12 months going forward.      Fib 4 7/22 = 2.46   Fib 4 6/2023 = 2.79    Fib-4 scoring system  Points <1.45:                      Cirrhosis less likely  Points =1.45 and =3.25:       Indeterminate  Points >3.25:                      Cirrhosis more likely    Elastography 6/2021 = F3.    MELD 12/2022 = 8  MELD 6/2023 = 8    No varices on endoscopy 2021 and again in 2022.    Coffee consumption has been shown to decrease the risk of HCC in patients with chronic liver disease.  In these patients, coffee consumption should be encouraged.  He is now drinking 2 to 3 cups daily with breakfast.    The patient was advised to stay active as possible and exercise as tolerated on a regular basis.  This is to pope off muscle wasting.  Chronic liver disease is a catabolic condition that will likely result in loss of muscle mass. Sarcopenia is associated with 2.2 fold increased risk of death and is more predictive of death than well-accepted indicaors of liver function like MELD and CPT.    The best way of slowing this process is to exercise regularly as much as possible as well as to ingest a moderate amount of dietary protein, at least 1.0-1.5mg/kg.  The patient should have at least 3 servings of protein daily.  Daily caloric intake should be 25-40kcal/kg/day.    These patients are also at risk for osteoporosis and vitamin D deficiency.  Jacinda D level slightly low--on supplement with normalization of level.  Sarcopenia worsens bone formation.  If evidence of osteoporosis, then treatment with biphosphonates  and calcium are safe and recommended.    Fluid restriction should only take place if there is significant hyponatremia.    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    Pt. also advised to take multivitamin daily.    Vaccines are important as well:    All adults need yearly vaccines against influenza    Twinrix vaccination completed--immune to Hep A , but not Hep B.  Recombivax started 11/2023.    All patients should receive pneumococcal vaccines--both the PCV13 and the PPSV23.  Pneumococcus PCV 13 (1 dose only, 12 months apart from PPSV 23),  and Pneumococcus PPSV 23 (every 5 years, for 2 doses and third dose at age 65)    These vaccines can be obtained through the patients PCP or alternatively, they can be given at [facility] without a prescription.         Relevant Orders    AFP Tumor Marker    CBC Auto Differential    Comprehensive Metabolic Panel    Protime-INR    US Liver    Intestinal metaplasia of gastric mucosa    Overview     Found on endoscopy 2021.  Mapping done 5/2022 and intestinal metaplasia found involving the body of the stomach.  The antrum was spared.  H pylori has already been eradicated.  Will need repeat endoscopy 5/2025.    Will try to do both endoscopy and colonoscopy at the same time.  His colonoscopy is due in February 2025; endoscopy in 5/2025.  Will try make arrangements for both.            Other    Hepatitis A immune    Overview     Labs confirm immunity 12/2022.         Nonimmune to hepatitis B virus    Overview     He is immune to hepatitis A.  He is not immune to hepatitis B.  Started the vaccine for Hep B in Nov 2023 and he just completed idn 6/2024.  Will check for antibodies in near future.              MEDICAL COMPLEXITY must have 2 out of 3   Moderate Complexity Level 4                                                                                                              1 of the following medical problems:                                                                                                []One chronic illness with mild exacerbation                                                                                [x]Two or more stable chronic illness                                                                                              []One new problem  []One acute illness with systemic symptoms     Complexity of Data  Reviewed (1 out of the 3 following categories)                                             Category 1 tests, documents, historian (must have 3 points)                                                      []Review of prior external records  []Review of results of unique tests  [x]Ordering unique tests   []Assessment requires an independent historian   Category 2 Interpretation of tests     []Independent interpretation of test read by another doc   Category 3 Discuss Management/tests  []Discussion with external physician     Risk of complications and/or morbidity                                                                                           []Prescription Drug Management     []Decision for elective endoscopic procedure              RTC 6 months      Dorothea Sales MD  06/13/24  14:08 CDT    Part of this note may be an electronic transcription/translation of spoken language to printed text.

## 2024-06-14 LAB
25(OH)D3 SERPL-MCNC: 60.2 NG/ML (ref 30–100)
ALPHA-FETOPROTEIN: 3.99 NG/ML (ref 0–8.3)

## 2024-06-27 ENCOUNTER — HOSPITAL ENCOUNTER (OUTPATIENT)
Dept: ULTRASOUND IMAGING | Facility: HOSPITAL | Age: 69
Discharge: HOME | End: 2024-06-27
Admitting: INTERNAL MEDICINE
Payer: COMMERCIAL

## 2024-06-27 DIAGNOSIS — K74.69 OTHER CIRRHOSIS OF LIVER: ICD-10-CM

## 2024-06-27 DIAGNOSIS — E83.110 HEREDITARY HEMOCHROMATOSIS: ICD-10-CM

## 2024-06-27 PROCEDURE — 76705 ECHO EXAM OF ABDOMEN: CPT

## 2024-08-27 DIAGNOSIS — I10 ESSENTIAL (PRIMARY) HYPERTENSION: ICD-10-CM

## 2024-08-27 RX ORDER — LISINOPRIL 40 MG/1
TABLET ORAL
Qty: 30 TABLET | Refills: 5 | Status: SHIPPED | OUTPATIENT
Start: 2024-08-27

## 2024-09-16 ENCOUNTER — TELEPHONE (OUTPATIENT)
Dept: PRIMARY CARE CLINIC | Age: 69
End: 2024-09-16

## 2024-10-15 ENCOUNTER — OFFICE VISIT (OUTPATIENT)
Dept: PRIMARY CARE CLINIC | Age: 69
End: 2024-10-15
Payer: COMMERCIAL

## 2024-10-15 VITALS
DIASTOLIC BLOOD PRESSURE: 76 MMHG | WEIGHT: 205 LBS | OXYGEN SATURATION: 97 % | BODY MASS INDEX: 32.11 KG/M2 | HEART RATE: 60 BPM | SYSTOLIC BLOOD PRESSURE: 132 MMHG | TEMPERATURE: 97.1 F

## 2024-10-15 DIAGNOSIS — Z92.29 HAS RECEIVED THIRD DOSE OF HEPATITIS B VACCINE: ICD-10-CM

## 2024-10-15 DIAGNOSIS — K21.9 GASTROESOPHAGEAL REFLUX DISEASE WITHOUT ESOPHAGITIS: ICD-10-CM

## 2024-10-15 DIAGNOSIS — K74.60 CIRRHOSIS OF LIVER WITHOUT ASCITES, UNSPECIFIED HEPATIC CIRRHOSIS TYPE (HCC): ICD-10-CM

## 2024-10-15 DIAGNOSIS — I10 ESSENTIAL (PRIMARY) HYPERTENSION: Primary | ICD-10-CM

## 2024-10-15 DIAGNOSIS — E83.110 HEREDITARY HEMOCHROMATOSIS (HCC): ICD-10-CM

## 2024-10-15 DIAGNOSIS — Z12.5 ENCOUNTER FOR PROSTATE CANCER SCREENING: ICD-10-CM

## 2024-10-15 DIAGNOSIS — R53.83 OTHER FATIGUE: ICD-10-CM

## 2024-10-15 DIAGNOSIS — Z13.220 LIPID SCREENING: ICD-10-CM

## 2024-10-15 LAB
ALBUMIN SERPL-MCNC: 3.9 G/DL (ref 3.5–5.2)
ALP SERPL-CCNC: 96 U/L (ref 40–129)
ALT SERPL-CCNC: 28 U/L (ref 5–41)
ANION GAP SERPL CALCULATED.3IONS-SCNC: 9 MMOL/L (ref 7–19)
AST SERPL-CCNC: 26 U/L (ref 5–40)
BASOPHILS # BLD: 0.1 K/UL (ref 0–0.2)
BASOPHILS NFR BLD: 1 % (ref 0–1)
BILIRUB SERPL-MCNC: 1 MG/DL (ref 0.2–1.2)
BUN SERPL-MCNC: 15 MG/DL (ref 8–23)
CALCIUM SERPL-MCNC: 9 MG/DL (ref 8.8–10.2)
CHLORIDE SERPL-SCNC: 102 MMOL/L (ref 98–111)
CHOLEST SERPL-MCNC: 182 MG/DL (ref 0–199)
CO2 SERPL-SCNC: 29 MMOL/L (ref 22–29)
CREAT SERPL-MCNC: 1.2 MG/DL (ref 0.7–1.2)
EOSINOPHIL # BLD: 0.12 K/UL (ref 0–0.6)
EOSINOPHIL NFR BLD: 1 % (ref 0–5)
ERYTHROCYTE [DISTWIDTH] IN BLOOD BY AUTOMATED COUNT: 12.4 % (ref 11.5–14.5)
GLUCOSE SERPL-MCNC: 110 MG/DL (ref 70–99)
HBV SURFACE AB SERPL IA-ACNC: REACTIVE M[IU]/ML
HCT VFR BLD AUTO: 49.6 % (ref 42–52)
HDLC SERPL-MCNC: 44 MG/DL (ref 40–60)
HGB BLD-MCNC: 16.4 G/DL (ref 14–18)
IMM GRANULOCYTES # BLD: 0 K/UL
LDLC SERPL CALC-MCNC: 105 MG/DL
LYMPHOCYTES # BLD: 7.6 K/UL (ref 1.1–4.5)
LYMPHOCYTES NFR BLD: 45 % (ref 20–40)
MACROCYTES BLD QL SMEAR: ABNORMAL
MCH RBC QN AUTO: 33.5 PG (ref 27–31)
MCHC RBC AUTO-ENTMCNC: 33.1 G/DL (ref 33–37)
MCV RBC AUTO: 101.2 FL (ref 80–94)
MONOCYTES # BLD: 1.3 K/UL (ref 0–0.9)
MONOCYTES NFR BLD: 11 % (ref 0–10)
NEUTROPHILS # BLD: 2.7 K/UL (ref 1.5–7.5)
NEUTS BAND NFR BLD MANUAL: 1 % (ref 0–5)
NEUTS SEG NFR BLD: 22 % (ref 50–65)
PLATELET # BLD AUTO: 113 K/UL (ref 130–400)
PLATELET SLIDE REVIEW: ABNORMAL
PMV BLD AUTO: 13.3 FL (ref 9.4–12.4)
POTASSIUM SERPL-SCNC: 4.2 MMOL/L (ref 3.5–5)
PROT SERPL-MCNC: 7.2 G/DL (ref 6.4–8.3)
PSA SERPL-MCNC: 1.13 NG/ML (ref 0–4)
RBC # BLD AUTO: 4.9 M/UL (ref 4.7–6.1)
SODIUM SERPL-SCNC: 140 MMOL/L (ref 136–145)
TRIGL SERPL-MCNC: 165 MG/DL (ref 0–149)
VARIANT LYMPHS NFR BLD: 19 % (ref 0–8)
WBC # BLD AUTO: 11.8 K/UL (ref 4.8–10.8)

## 2024-10-15 PROCEDURE — 1123F ACP DISCUSS/DSCN MKR DOCD: CPT | Performed by: FAMILY MEDICINE

## 2024-10-15 PROCEDURE — 90653 IIV ADJUVANT VACCINE IM: CPT | Performed by: FAMILY MEDICINE

## 2024-10-15 PROCEDURE — G8482 FLU IMMUNIZE ORDER/ADMIN: HCPCS | Performed by: FAMILY MEDICINE

## 2024-10-15 PROCEDURE — 3075F SYST BP GE 130 - 139MM HG: CPT | Performed by: FAMILY MEDICINE

## 2024-10-15 PROCEDURE — G8427 DOCREV CUR MEDS BY ELIG CLIN: HCPCS | Performed by: FAMILY MEDICINE

## 2024-10-15 PROCEDURE — 3017F COLORECTAL CA SCREEN DOC REV: CPT | Performed by: FAMILY MEDICINE

## 2024-10-15 PROCEDURE — 1036F TOBACCO NON-USER: CPT | Performed by: FAMILY MEDICINE

## 2024-10-15 PROCEDURE — G8417 CALC BMI ABV UP PARAM F/U: HCPCS | Performed by: FAMILY MEDICINE

## 2024-10-15 PROCEDURE — 3078F DIAST BP <80 MM HG: CPT | Performed by: FAMILY MEDICINE

## 2024-10-15 PROCEDURE — 90471 IMMUNIZATION ADMIN: CPT | Performed by: FAMILY MEDICINE

## 2024-10-15 PROCEDURE — 99214 OFFICE O/P EST MOD 30 MIN: CPT | Performed by: FAMILY MEDICINE

## 2024-10-15 NOTE — PROGRESS NOTES
Last 3 Encounters:   06/04/24 95.7 kg (211 lb)   03/21/24 95.3 kg (210 lb)   02/01/24 96.2 kg (212 lb)           Physical Exam  Physical Exam   Constitutional: He appears well. Does not appear ill.   Neck: Neck supple. No masses.  Neck Symmetric.  Normal tracheal position.  No thyroid enlargement  Cardiovascular: Normal rate and regular rhythm.  Exam reveals no friction rub.  No murmur heard.  Respiratory:  Effort normal and breath sounds normal. No respiratory distress. No wheezes. No rales. No use of accessory muscles or intercostal retractions.  Neurological: alert.   Psychiatric: normal mood and affect. His behavior is normal.     Lab Results   Component Value Date    WBC 9.2 07/13/2023    HGB 16.4 07/13/2023    HCT 48.0 07/13/2023    MCV 98.0 (H) 07/13/2023     07/13/2023     Lab Results   Component Value Date    LABA1C 5.3 07/13/2023     Lab Results   Component Value Date    CREATININE 1.1 07/28/2023      Lab Results   Component Value Date     07/28/2023    K 3.8 07/28/2023     07/28/2023    CO2 24 07/28/2023    BUN 16 07/28/2023    CREATININE 1.1 07/28/2023    GLUCOSE 95 07/28/2023    CALCIUM 8.9 07/28/2023    BILITOT 0.8 07/28/2023    ALKPHOS 106 07/28/2023    AST 27 07/28/2023    ALT 31 07/28/2023    LABGLOM >60 07/28/2023    GFRAA >59 07/07/2022    Lab Results   Component Value Date    CHOL 200 (H) 07/13/2023    CHOL 177 07/07/2022    CHOL 214 (H) 01/07/2022     Lab Results   Component Value Date    TRIG 217 (H) 07/13/2023    TRIG 216 (H) 07/07/2022    TRIG 213 (H) 01/07/2022     Lab Results   Component Value Date    HDL 37 (L) 07/13/2023    HDL 36 (L) 07/07/2022    HDL 47 (L) 01/07/2022     Lab Results   Component Value Date     07/13/2023    LDL 98 07/07/2022     01/07/2022     No results found for: \"VLDL\"  No results found for: \"CHOLHDLRATIO\"                Assessment Plan:   1. Essential (primary) hypertension    2. Gastroesophageal reflux disease without esophagitis

## 2024-11-22 DIAGNOSIS — M15.0 PRIMARY OSTEOARTHRITIS INVOLVING MULTIPLE JOINTS: ICD-10-CM

## 2024-11-22 RX ORDER — SILDENAFIL 100 MG/1
TABLET, FILM COATED ORAL
Qty: 3 TABLET | Refills: 4 | Status: SHIPPED | OUTPATIENT
Start: 2024-11-22

## 2024-11-22 RX ORDER — MELOXICAM 15 MG/1
15 TABLET ORAL DAILY PRN
Qty: 30 TABLET | Refills: 5 | Status: SHIPPED | OUTPATIENT
Start: 2024-11-22

## 2024-12-27 ENCOUNTER — OFFICE VISIT (OUTPATIENT)
Age: 69
End: 2024-12-27

## 2024-12-27 VITALS
OXYGEN SATURATION: 95 % | SYSTOLIC BLOOD PRESSURE: 126 MMHG | WEIGHT: 205 LBS | DIASTOLIC BLOOD PRESSURE: 74 MMHG | BODY MASS INDEX: 32.11 KG/M2 | HEART RATE: 78 BPM | TEMPERATURE: 97.8 F | RESPIRATION RATE: 18 BRPM

## 2024-12-27 DIAGNOSIS — J02.9 SORE THROAT: ICD-10-CM

## 2024-12-27 DIAGNOSIS — R05.1 ACUTE COUGH: Primary | ICD-10-CM

## 2024-12-27 LAB
Lab: NORMAL
QC PASS/FAIL: NORMAL
S PYO AG THROAT QL: NORMAL
SARS-COV-2, POC: NORMAL

## 2024-12-27 RX ORDER — PREDNISONE 10 MG/1
10 TABLET ORAL 2 TIMES DAILY
Qty: 10 TABLET | Refills: 0 | Status: SHIPPED | OUTPATIENT
Start: 2024-12-27 | End: 2025-01-01

## 2024-12-27 RX ORDER — BROMPHENIRAMINE MALEATE, PSEUDOEPHEDRINE HYDROCHLORIDE, AND DEXTROMETHORPHAN HYDROBROMIDE 2; 30; 10 MG/5ML; MG/5ML; MG/5ML
5-10 SYRUP ORAL 4 TIMES DAILY PRN
Qty: 200 ML | Refills: 0 | Status: SHIPPED | OUTPATIENT
Start: 2024-12-27 | End: 2025-01-01

## 2024-12-27 ASSESSMENT — ENCOUNTER SYMPTOMS
ABDOMINAL PAIN: 0
SINUS PAIN: 0
APNEA: 0
ABDOMINAL DISTENTION: 0
WHEEZING: 0
SINUS PRESSURE: 0
COUGH: 1
EYE DISCHARGE: 0
EYE REDNESS: 0
CHEST TIGHTNESS: 0
NAUSEA: 0
CONSTIPATION: 0
COLOR CHANGE: 0
TROUBLE SWALLOWING: 0
VOMITING: 0
FACIAL SWELLING: 0
SHORTNESS OF BREATH: 0
EYE PAIN: 0
DIARRHEA: 0
CHOKING: 0
SORE THROAT: 0
STRIDOR: 0

## 2024-12-27 NOTE — PROGRESS NOTES
General: Bowel sounds are normal. There is no distension.      Palpations: Abdomen is soft.      Tenderness: There is no abdominal tenderness. There is no guarding.   Musculoskeletal:         General: Normal range of motion.      Cervical back: Normal range of motion.   Skin:     General: Skin is warm.      Capillary Refill: Capillary refill takes less than 2 seconds.      Coloration: Skin is not jaundiced or pale.      Findings: No erythema or rash.   Neurological:      General: No focal deficit present.      Mental Status: He is alert and oriented to person, place, and time.      Deep Tendon Reflexes: Reflexes are normal and symmetric.   Psychiatric:         Attention and Perception: Attention normal.         Mood and Affect: Mood normal.         Behavior: Behavior normal. Behavior is cooperative.         Thought Content: Thought content normal.         /74   Pulse 78   Temp 97.8 °F (36.6 °C) (Temporal)   Resp 18   Wt 93 kg (205 lb)   SpO2 95%   BMI 32.11 kg/m²     Assessment   Assessment & Plan      Diagnosis Orders   1. Acute cough  POCT rapid strep A    POCT COVID-19 Antigen Card    brompheniramine-pseudoephedrine-DM 2-30-10 MG/5ML syrup    predniSONE (DELTASONE) 10 MG tablet      2. Sore throat  predniSONE (DELTASONE) 10 MG tablet          Plan   Strep and COVID tests are negative    Bromfed as needed for cough    Prednisone prescribed. Take as directed.    Follow up with PCP if worsening or not improving.     Any condition can change, despite proper treatment. Therefore, if symptoms still persist or worsen after treatment plan intitated today, either go to the nearest ER, or call PCP, or return to UC for further evaluation.    Urgent Care evaluation today is not a substitute for PCP visit. Follow up care is your responsibility to discuss and review this UC visit.    Discussed use, benefits, and side effects of any prescribed medications. All patient questions were answered. Patient demonstrates

## 2024-12-27 NOTE — PATIENT INSTRUCTIONS
Strep and COVID tests are negative    Bromfed as needed for cough    Prednisone prescribed. Take as directed.    Follow up with PCP if worsening or not improving.     Any condition can change, despite proper treatment. Therefore, if symptoms still persist or worsen after treatment plan intitated today, either go to the nearest ER, or call PCP, or return to UC for further evaluation.    Urgent Care evaluation today is not a substitute for PCP visit. Follow up care is your responsibility to discuss and review this UC visit.

## 2025-01-03 NOTE — PROGRESS NOTES
Primary Physician: Ermias Montiero MD    Chief Complaint   Patient presents with    Follow-up     Pt presents today for cirrhosis and hereditary hemochromatosis follow up-states he is feeling good; Pt's last colon was 2/24/2020-personal history of adenomatous and hyperplastic polyps and family history of colon polyps       Subjective     Frankie Reid is a 69 y.o. male.    HPI  Genetic hemochromatosis with progression to cirrhosis  Patient had CTA at [facility] 4/5/2021 which showed nodular changes of the liver concerning for cirrhosis.  LFTs have been normal.  Ferritin in the 700 range. He is homozygous for H63D mutation.  No family history of liver disease.  He was drinking 1-2 cases of beer a week or 1/5th whiskey a week.  He stopped alcohol on April 4, 2021, but he has resumed a beer about 2x/week.  In 12/2022 about a beer a day.  In 6/2023 he is drinking about 3 beers/week.  Even less in 12/2023--about 2 beers/month and still at this range in 6/2024.     Sonogram of the liver with elastography 6/2021 shows F3/F4 status.  No masses.  Endoscopy 2021 does not show varices.  Ferritin in correct range starting 9/2021 after a series of phlebotomy.   Was on every 3 month cycle in 2021 and this was changed to every 6 months in 2022.  Last few phleb 2/2023 and 8/2023.  Ferritin 8/2023 136.  Hb stable. Phlebotomy 2/2024--ferritin 130.  Completed Recombivax in June 2024.     Vitamin D insufficiency  Vitamin D level just below normal 12/2022.  He was taking vitamin D 5000 units daily, but stopped it somewhere along the way.  Level normal 6/2024.      Gastric intestinal metaplasia with h/o gastric ulcers  Endoscopy 9/2021 to follow-up on gastric ulcer from 7/2021 showed antral gastric intestinal metaplasia.  He had previously had H pylori in July which was treated and then found to be cured.  Metaplasia gastric mapping via biopsy done 5/2022.  No neoplasia noted.  Plan to repeat endoscopy 5/2025.      Adenomatous colon polyps and family hx colon polyps  Adenomas were removed in 2012 and 2017.  No adenomas 2/2020.  He will need a repeat colonoscopy in 2/2025.  His sister also has had colon polyps less than 60 years old.      Past Medical History:   Diagnosis Date    Arthritis     Diverticulosis     ED (erectile dysfunction)     Family history of colonic polyps     GERD (gastroesophageal reflux disease)     Hereditary hemochromatosis     History of adenomatous polyp of colon     History of colon polyps     Hypertension     Other cirrhosis of liver        Past Surgical History:   Procedure Laterality Date    COLONOSCOPY N/A 02/10/2017    One 7mm sessile serated adenomatous polyp in the ascending colon; Two 5-7mm adenomatous polyps in the transverse colon; One 4mm hyperplastic polyp in the rectum; Repeat 3 years    COLONOSCOPY  10/25/2012    One 6mm adenomatous polyp in the sigmoid colon; Repeat 4 years    COLONOSCOPY N/A 02/24/2020    Diverticulosis in the left colon; The examination was otherwise normal on direct and retroflexion views; No specimens collected; Repeat 5 years    ENDOSCOPY N/A 07/19/2021    Non-bleeding gastric ulcer with no stigmata of bleeding-biopsied; Non-bleeding erosive gastropathy; Normal examined duodenum; Repeat 2 months    ENDOSCOPY N/A 09/20/2021    Small HH; Erythematous mucosa in the antrum-biopsied; Normal examined duodenum    ENDOSCOPY N/A 05/04/2022    LA Grade A reflux esophagitis; Erythematous mucosa in the antrum-biopsied; Normal examined duodenum    MULTIPLE TOOTH EXTRACTIONS      TONSILLECTOMY          Current Outpatient Medications:     Cholecalciferol (Vitamin D-3) 125 MCG (5000 UT) tablet, Take 1 tablet by mouth Daily., Disp: , Rfl:     lisinopril (PRINIVIL,ZESTRIL) 20 MG tablet, Take 1 tablet by mouth Daily., Disp: , Rfl:     meloxicam (MOBIC) 15 MG tablet, Take 1 tablet by mouth Daily., Disp: , Rfl:     omeprazole (priLOSEC) 20 MG capsule, Take 1 capsule by mouth Daily.,  "Disp: , Rfl:     sildenafil (VIAGRA) 100 MG tablet, Take 1 tablet by mouth As Needed., Disp: , Rfl:     metoclopramide (REGLAN) 10 MG tablet, 1 tablet 30 minutes before each part of colon prep, Disp: 2 tablet, Rfl: 0    sodium-potassium-magnesium sulfates (Suprep Bowel Prep Kit) 17.5-3.13-1.6 GM/177ML solution oral solution, Take 1 bottle by mouth Take As Directed for 1 day. Split dose prep as directed by office instructions provided.  2 bottles (354ml) = one kit., Disp: 354 mL, Rfl: 0    Allergies   Allergen Reactions    Codeine Nausea And Vomiting       Social History     Socioeconomic History    Marital status:    Tobacco Use    Smoking status: Never    Smokeless tobacco: Never   Vaping Use    Vaping status: Never Used   Substance and Sexual Activity    Alcohol use: Not Currently    Drug use: No    Sexual activity: Defer       Family History   Problem Relation Age of Onset    Colon polyps Sister         < 60 years of age    No Known Problems Mother     Heart disease Father     Colon cancer Neg Hx     Esophageal cancer Neg Hx     Liver cancer Neg Hx     Liver disease Neg Hx     Rectal cancer Neg Hx     Stomach cancer Neg Hx        Review of Systems   Respiratory:  Negative for shortness of breath.    Cardiovascular:  Negative for chest pain.       Objective     /78 (BP Location: Left arm, Patient Position: Sitting, Cuff Size: Adult)   Pulse 75   Temp 97.7 °F (36.5 °C) (Infrared)   Ht 170.2 cm (67\")   Wt 93.9 kg (207 lb)   SpO2 96%   BMI 32.42 kg/m²     Physical Exam  Constitutional:       Appearance: He is well-developed.   Pulmonary:      Effort: Pulmonary effort is normal.   Musculoskeletal:         General: Normal range of motion.   Skin:     General: Skin is warm.   Neurological:      Mental Status: He is alert and oriented to person, place, and time.   Psychiatric:         Behavior: Behavior normal.         Lab Results - Last 18 Months   Lab Units 10/15/24  1410 06/13/24  1428   GLUCOSE " mg/dL 110* 158*   BUN mg/dL 15 16   CREATININE mg/dL 1.2 1.23   SODIUM mmol/L 140 140   POTASSIUM mmol/L 4.2 3.5   CHLORIDE mmol/L 102 103   TOTAL CO2 mmol/L 29  --    CO2 mmol/L  --  26.0   TOTAL PROTEIN g/dL 7.2 7.1   ALBUMIN g/dL 3.9 3.9   ALT (SGPT) U/L 28 25   AST (SGOT) U/L 26 27   ALK PHOS U/L 96 101   BILIRUBIN mg/dL 1.0 0.8   GLOBULIN gm/dL  --  3.2   ALPHA-FETOPROTEIN ng/mL  --  3.99       Lab Results - Last 18 Months   Lab Units 10/15/24  1410 06/13/24  1428 02/27/24  0909 08/03/23  0807 07/13/23  0738   HEMOGLOBIN g/dL 16.4 15.4 15.9 16.3 16.4   HEMATOCRIT % 49.6 44.4 45.9 48.4 48.0   MCV fL 101.2* 94.5 95.4 96.0 98.0*   WBC K/uL 11.8* 10.91* 10.74 9.54 9.2   RDW % 12.4 12.6 12.5 12.7 13.2   MPV fL 13.3* 12.6* 12.1* 12.1* 12.0   PLATELETS K/uL 113* 115* 107* 122* 142   INR   --  1.00  --   --   --        Lab Results - Last 18 Months   Lab Units 06/13/24  1428 02/27/24  0909 08/03/23  0807 07/28/23  1247   IRON ug/dL  --   --   --  174*   FERRITIN ng/mL  --  129.50 136.60 149.1   VIT D 25 HYDROXY ng/ml 60.2  --   --   --         Lab Results - Last 18 Months   Lab Units 06/13/24  1428 02/27/24  0909   ALPHA-FETOPROTEIN ng/mL 3.99  --    FERRITIN ng/mL  --  129.50           IMPRESSION/PLAN:    Assessment & Plan      Problem List Items Addressed This Visit          Coag and Thromboembolic    Thrombocytopenia    Overview     Likely related to advancing liver disease.  Plt in the low 100K range.         Relevant Orders    CBC Auto Differential       Endocrine and Metabolic    Vitamin D insufficiency    Overview     Vitamin D level slightly low 12/2022.  He was taking vitamin D 5000 units daily, but stopped it somewhere along the way.  Level normal 7/2023.  He knows to resume it.  He is taking 5000 U daily and level is normal 6/2024.         Hereditary hemochromatosis    Overview     Homozygous for H63D.  Ferritin 700 range originally and corrected with phlebotomy. Pt aware that hereditary hemochromatosis is an  independent risk factor for hepatocellular carcinoma.  He will need laboratories and serial sonograms of liver done on a regular basis.    Ferritin in correct range starting 9/2021 after a series of phlebotomy.  Was on every 3 month cycle in 2021.  Has been on every 6 month cycle 2022 and ferritins are holding in acceptable range.  Phleb done 2/2023, 8/2023, 2/2024.    Will check ferritin and make decision as to when next phlebotomy needs to take place.         Relevant Orders    Ferritin       Family History    Family history of polyps in the colon    Overview     Sister had colon polyps less than 60 years old.         Relevant Medications    metoclopramide (REGLAN) 10 MG tablet    sodium-potassium-magnesium sulfates (Suprep Bowel Prep Kit) 17.5-3.13-1.6 GM/177ML solution oral solution    Other Relevant Orders    Case Request (Completed)       Gastrointestinal Abdominal     History of adenomatous polyp of colon    Overview     Adenomatous colon polyps were removed in 2012 and 2017.  Last colonoscopy 2/2020 unremarkable.  He will be due for repeat colonoscopy in 2/2025.    Preparation for colonoscopy 2/2020 was done with Suprep.  The preparation was adequate to identify polyps greater than 6 mm in size.  He had nausea and vomiting with the second dose.  Will plan to give Reglan prior to each dose of Suprep.  Also have him drink MiraLAX 3 times a day for 3 days prior to prepping.         Current Assessment & Plan     The risks, benefits, and alternatives of colonoscopy were reviewed with the patient today.  Risks including perforation of the colon possibly requiring surgery or colostomy.  Additional risks include risk of bleeding from biopsies or removal of colon tissue.  There is also the risk of a drug reaction or problems with anesthesia.  This will be discussed with the further by the anesthesia team on the day of the procedure.  Lastly there is a possibility of missing a colon polyp or cancer.  The benefits  include the diagnosis and management of disease of the colon and rectum.  Alternatives to colonoscopy include barium enema, laboratory testing, radiographic evaluation, or no intervention.  The patient verbalizes understanding and agrees.    In accordance with requirements under the Affordable Care Act, [facility] has provided pricing for all hospital services and items on each of its websites. However, a patient's actual cost may differ based on the services the patient receives to meet individual healthcare needs and based on the benefits provided under the patient’s insurance coverage.           Relevant Medications    metoclopramide (REGLAN) 10 MG tablet    sodium-potassium-magnesium sulfates (Suprep Bowel Prep Kit) 17.5-3.13-1.6 GM/177ML solution oral solution    Other Relevant Orders    Case Request (Completed)    Other cirrhosis of liver - Primary    Overview     CTA of the chest shows nodular liver suggestive of cirrhosis.  Sono imaging 7/2022, 1/2023 and 6/2023 suggestive of cirrhosis.  LFTs are normal.  Full serological evaluation unremarkable with exception of a ferritin in the 700 range with increased iron saturation. Homozygous for H63D mutation.  Patient is at risk for hepatoma formation given cirrhosis; will need to have serial sono of the liver and AFP Measurements every 6-12 months going forward.      Fib 4 7/22 = 2.46   Fib 4 6/2023 = 2.79  Fib 4 6/2024 = 3.15    Fib-4 scoring system  Points <1.45:                      Cirrhosis less likely  Points >=1.45 and <=3.25:       Indeterminate  Points >3.25:                      Cirrhosis more likely    Elastography 6/2021 = F3.    MELD 12/2022 = 8  MELD 6/2023 = 8  MELD 6/2024 = 8    No varices on endoscopy 2021 and again in 2022.  He is not on a beta-blocker.  Due to repeat endoscopy in 2025.    Coffee consumption has been shown to decrease the risk of HCC in patients with chronic liver disease.  In these patients, coffee consumption should be  encouraged.  He is now drinking 2 to 3 cups daily with breakfast.    The patient was advised to stay active as possible and exercise as tolerated on a regular basis.  This is to pope off muscle wasting.  Chronic liver disease is a catabolic condition that will likely result in loss of muscle mass. Sarcopenia is associated with 2.2 fold increased risk of death and is more predictive of death than well-accepted indicaors of liver function like MELD and CPT.    The best way of slowing this process is to exercise regularly as much as possible as well as to ingest a moderate amount of dietary protein, at least 1.0-1.5mg/kg.  The patient should have at least 3 servings of protein daily.  Daily caloric intake should be 25-40kcal/kg/day.    These patients are also at risk for osteoporosis and vitamin D deficiency.  Jacinda D level normal 6/2024.  Sarcopenia worsens bone formation.  If evidence of osteoporosis, then treatment with biphosphonates and calcium are safe and recommended.    Fluid restriction should only take place if there is significant hyponatremia.    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    Pt. also advised to take multivitamin daily.    Vaccines are important as well:    All adults need yearly vaccines against influenza    Twinrix vaccination completed--immune to Hep A , but not Hep B.  Recombivax completed in 2024.  Blood work now shows that he is immune to hepatitis B.    All patients should receive pneumococcal vaccines--both the PCV13 and the PPSV23.  Pneumococcus PCV 13 (1 dose only, 12 months apart from PPSV 23),  and Pneumococcus PPSV 23 (every 5 years, for 2 doses and third dose at age 65)    These vaccines can be obtained through the patients PCP or alternatively, they can be given at [facility] without a prescription.         Relevant Orders    AFP Tumor Marker    CBC Auto Differential    Comprehensive Metabolic Panel    Protime-INR    Ferritin    Vitamin B12 &  Folate    Intestinal metaplasia of gastric mucosa    Overview     Found on endoscopy 2021.  Mapping done 5/2022 and intestinal metaplasia found involving the body of the stomach.  The antrum was spared.  H pylori has already been eradicated.  Will need repeat endoscopy 5/2025.    Will try to do both endoscopy and colonoscopy at the same time.  His colonoscopy is due in February 2025; endoscopy in 5/2025.  Will try make arrangements for both.         Current Assessment & Plan     The risks, benefits, and alternatives of endoscopy were reviewed with the patient today.  Risks including perforation, with or without dilation, possibly requiring surgery.  Additional risks include risk of bleeding.  There is also the risk of a drug reaction or problems with anesthesia.  This will be discussed with the further by the anesthesia team on the day of the procedure. The benefits include the diagnosis and management of disease of the upper digestive tract.  Alternatives to endoscopy include upper GI series, laboratory testing, radiographic evaluation, or no intervention.  The patient verbalizes understanding and agrees.    In accordance with requirements under the Affordable Care Act, [facility] has provided pricing for all hospital services and items on each of its websites. However, a patient's actual cost may differ based on the services the patient receives to meet individual healthcare needs and based on the benefits provided under the patient’s insurance coverage.           Relevant Medications    metoclopramide (REGLAN) 10 MG tablet    Other Relevant Orders    CBC Auto Differential    Case Request (Completed)       Other    Hepatitis A immune    Overview     Labs confirm immunity 12/2022.         Immune to hepatitis B    Overview     He is immune to hepatitis A.  He is not immune to hepatitis B.  Hep B completed in 6/2024.  He is now immune per labs.                MEDICAL COMPLEXITY must have 2 out of 3   Moderate  Complexity Level 4                                                                                                              1 of the following medical problems:                                                                                               []One chronic illness with mild exacerbation                                                                                [x]Two or more stable chronic illness                                                                                              []One new problem  []One acute illness with systemic symptoms     Complexity of Data  Reviewed (1 out of the 3 following categories)                                             Category 1 tests, documents, historian (must have 3 points)                                                      []Review of prior external records  []Review of results of unique tests  [x]Ordering unique tests   []Assessment requires an independent historian   Category 2 Interpretation of tests     []Independent interpretation of test read by another doc   Category 3 Discuss Management/tests  []Discussion with external physician     Risk of complications and/or morbidity                                                                                           [x]Prescription Drug Management     [x]Decision for elective endoscopic procedure            RTC 1 year      Dorothea Sales MD  01/08/25  14:35 CST    Part of this note may be an electronic transcription/translation of spoken language to printed text.

## 2025-01-08 ENCOUNTER — LAB (OUTPATIENT)
Dept: LAB | Facility: HOSPITAL | Age: 70
End: 2025-01-08
Payer: COMMERCIAL

## 2025-01-08 ENCOUNTER — OFFICE VISIT (OUTPATIENT)
Dept: GASTROENTEROLOGY | Facility: CLINIC | Age: 70
End: 2025-01-08
Payer: COMMERCIAL

## 2025-01-08 VITALS
WEIGHT: 207 LBS | OXYGEN SATURATION: 96 % | HEIGHT: 67 IN | BODY MASS INDEX: 32.49 KG/M2 | SYSTOLIC BLOOD PRESSURE: 122 MMHG | HEART RATE: 75 BPM | DIASTOLIC BLOOD PRESSURE: 78 MMHG | TEMPERATURE: 97.7 F

## 2025-01-08 DIAGNOSIS — K31.A0 INTESTINAL METAPLASIA OF GASTRIC MUCOSA: ICD-10-CM

## 2025-01-08 DIAGNOSIS — Z78.9 HEPATITIS A IMMUNE: ICD-10-CM

## 2025-01-08 DIAGNOSIS — E83.110 HEREDITARY HEMOCHROMATOSIS: ICD-10-CM

## 2025-01-08 DIAGNOSIS — Z83.719 FAMILY HISTORY OF POLYPS IN THE COLON: ICD-10-CM

## 2025-01-08 DIAGNOSIS — Z78.9 IMMUNE TO HEPATITIS B: ICD-10-CM

## 2025-01-08 DIAGNOSIS — E55.9 VITAMIN D INSUFFICIENCY: ICD-10-CM

## 2025-01-08 DIAGNOSIS — Z86.0101 HISTORY OF ADENOMATOUS POLYP OF COLON: ICD-10-CM

## 2025-01-08 DIAGNOSIS — K74.69 OTHER CIRRHOSIS OF LIVER: Primary | ICD-10-CM

## 2025-01-08 DIAGNOSIS — D69.6 THROMBOCYTOPENIA: ICD-10-CM

## 2025-01-08 DIAGNOSIS — K74.69 OTHER CIRRHOSIS OF LIVER: ICD-10-CM

## 2025-01-08 LAB
ALBUMIN SERPL-MCNC: 3.8 G/DL (ref 3.5–5.2)
ALBUMIN/GLOB SERPL: 1.1 G/DL
ALP SERPL-CCNC: 100 U/L (ref 39–117)
ALT SERPL W P-5'-P-CCNC: 43 U/L (ref 1–41)
ANION GAP SERPL CALCULATED.3IONS-SCNC: 9 MMOL/L (ref 5–15)
AST SERPL-CCNC: 37 U/L (ref 1–40)
BASOPHILS # BLD MANUAL: 0 10*3/MM3 (ref 0–0.2)
BASOPHILS NFR BLD MANUAL: 0 % (ref 0–1.5)
BILIRUB SERPL-MCNC: 0.6 MG/DL (ref 0–1.2)
BUN SERPL-MCNC: 17 MG/DL (ref 8–23)
BUN/CREAT SERPL: 16.3 (ref 7–25)
CALCIUM SPEC-SCNC: 9.1 MG/DL (ref 8.6–10.5)
CHLORIDE SERPL-SCNC: 106 MMOL/L (ref 98–107)
CO2 SERPL-SCNC: 24 MMOL/L (ref 22–29)
CREAT SERPL-MCNC: 1.04 MG/DL (ref 0.76–1.27)
DEPRECATED RDW RBC AUTO: 45.8 FL (ref 37–54)
EGFRCR SERPLBLD CKD-EPI 2021: 78.7 ML/MIN/1.73
EOSINOPHIL # BLD MANUAL: 0 10*3/MM3 (ref 0–0.4)
EOSINOPHIL NFR BLD MANUAL: 0 % (ref 0.3–6.2)
ERYTHROCYTE [DISTWIDTH] IN BLOOD BY AUTOMATED COUNT: 12.7 % (ref 12.3–15.4)
FERRITIN SERPL-MCNC: 250.5 NG/ML (ref 30–400)
GIANT PLATELETS: ABNORMAL
GLOBULIN UR ELPH-MCNC: 3.6 GM/DL
GLUCOSE SERPL-MCNC: 100 MG/DL (ref 65–99)
HCT VFR BLD AUTO: 47.7 % (ref 37.5–51)
HGB BLD-MCNC: 16.4 G/DL (ref 13–17.7)
INR PPP: 0.92 (ref 0.91–1.09)
LYMPHOCYTES # BLD MANUAL: 10.74 10*3/MM3 (ref 0.7–3.1)
LYMPHOCYTES NFR BLD MANUAL: 6.6 % (ref 5–12)
MCH RBC QN AUTO: 33.7 PG (ref 26.6–33)
MCHC RBC AUTO-ENTMCNC: 34.4 G/DL (ref 31.5–35.7)
MCV RBC AUTO: 98.1 FL (ref 79–97)
MONOCYTES # BLD: 1.07 10*3/MM3 (ref 0.1–0.9)
NEUTROPHILS # BLD AUTO: 4.45 10*3/MM3 (ref 1.7–7)
NEUTROPHILS NFR BLD MANUAL: 27.4 % (ref 42.7–76)
PLATELET # BLD AUTO: 106 10*3/MM3 (ref 140–450)
PMV BLD AUTO: 12.6 FL (ref 6–12)
POIKILOCYTOSIS BLD QL SMEAR: ABNORMAL
POTASSIUM SERPL-SCNC: 4.5 MMOL/L (ref 3.5–5.2)
PROT SERPL-MCNC: 7.4 G/DL (ref 6–8.5)
PROTHROMBIN TIME: 12.8 SECONDS (ref 11.8–14.8)
RBC # BLD AUTO: 4.86 10*6/MM3 (ref 4.14–5.8)
SMALL PLATELETS BLD QL SMEAR: ABNORMAL
SODIUM SERPL-SCNC: 139 MMOL/L (ref 136–145)
VARIANT LYMPHS NFR BLD MANUAL: 1.9 % (ref 0–5)
VARIANT LYMPHS NFR BLD MANUAL: 64.2 % (ref 19.6–45.3)
WBC MORPH BLD: NORMAL
WBC NRBC COR # BLD AUTO: 16.25 10*3/MM3 (ref 3.4–10.8)

## 2025-01-08 PROCEDURE — 85610 PROTHROMBIN TIME: CPT

## 2025-01-08 PROCEDURE — 82746 ASSAY OF FOLIC ACID SERUM: CPT

## 2025-01-08 PROCEDURE — 85007 BL SMEAR W/DIFF WBC COUNT: CPT

## 2025-01-08 PROCEDURE — 82728 ASSAY OF FERRITIN: CPT

## 2025-01-08 PROCEDURE — 36415 COLL VENOUS BLD VENIPUNCTURE: CPT

## 2025-01-08 PROCEDURE — 80053 COMPREHEN METABOLIC PANEL: CPT

## 2025-01-08 PROCEDURE — 82105 ALPHA-FETOPROTEIN SERUM: CPT

## 2025-01-08 PROCEDURE — 85025 COMPLETE CBC W/AUTO DIFF WBC: CPT

## 2025-01-08 PROCEDURE — 99214 OFFICE O/P EST MOD 30 MIN: CPT | Performed by: INTERNAL MEDICINE

## 2025-01-08 PROCEDURE — 82607 VITAMIN B-12: CPT

## 2025-01-08 RX ORDER — SODIUM, POTASSIUM,MAG SULFATES 17.5-3.13G
177 SOLUTION, RECONSTITUTED, ORAL ORAL TAKE AS DIRECTED
Qty: 354 ML | Refills: 0 | Status: SHIPPED | OUTPATIENT
Start: 2025-01-08 | End: 2025-01-09

## 2025-01-08 RX ORDER — METOCLOPRAMIDE 10 MG/1
TABLET ORAL
Qty: 2 TABLET | Refills: 0 | Status: SHIPPED | OUTPATIENT
Start: 2025-01-08

## 2025-01-08 RX ORDER — SILDENAFIL 100 MG/1
100 TABLET, FILM COATED ORAL AS NEEDED
COMMUNITY
Start: 2024-11-22

## 2025-01-08 NOTE — ASSESSMENT & PLAN NOTE
The risks, benefits, and alternatives of endoscopy were reviewed with the patient today.  Risks including perforation, with or without dilation, possibly requiring surgery.  Additional risks include risk of bleeding.  There is also the risk of a drug reaction or problems with anesthesia.  This will be discussed with the further by the anesthesia team on the day of the procedure. The benefits include the diagnosis and management of disease of the upper digestive tract.  Alternatives to endoscopy include upper GI series, laboratory testing, radiographic evaluation, or no intervention.  The patient verbalizes understanding and agrees.    In accordance with requirements under the Affordable Care Act, [facility] has provided pricing for all hospital services and items on each of its websites. However, a patient's actual cost may differ based on the services the patient receives to meet individual healthcare needs and based on the benefits provided under the patient’s insurance coverage.

## 2025-01-08 NOTE — LETTER
January 8, 2025     Ermias Monteiro MD  [address] [place] [address] [address]  [place] [address]    Patient: Frankie Reid   YOB: 1955   Date of Visit: 1/8/2025     Dear Ermias Monteiro MD:       Thank you for referring Frankie Reid to me for evaluation. Below are the relevant portions of my assessment and plan of care.    If you have questions, please do not hesitate to call me. I look forward to following Frankie along with you.         Sincerely,        Dorothea Sales MD        CC: No Recipients    Dorothea Sales MD  01/08/25 1438  Sign when Signing Visit  Primary Physician: Ermias Monteiro MD    Chief Complaint   Patient presents with   • Follow-up     Pt presents today for cirrhosis and hereditary hemochromatosis follow up-states he is feeling good; Pt's last colon was 2/24/2020-personal history of adenomatous and hyperplastic polyps and family history of colon polyps       Subjective    Frankie Reid is a 69 y.o. male.    HPI  Genetic hemochromatosis with progression to cirrhosis  Patient had CTA at [facility] 4/5/2021 which showed nodular changes of the liver concerning for cirrhosis.  LFTs have been normal.  Ferritin in the 700 range. He is homozygous for H63D mutation.  No family history of liver disease.  He was drinking 1-2 cases of beer a week or 1/5th whiskey a week.  He stopped alcohol on April 4, 2021, but he has resumed a beer about 2x/week.  In 12/2022 about a beer a day.  In 6/2023 he is drinking about 3 beers/week.  Even less in 12/2023--about 2 beers/month and still at this range in 6/2024.     Sonogram of the liver with elastography 6/2021 shows F3/F4 status.  No masses.  Endoscopy 2021 does not show varices.  Ferritin in correct range starting 9/2021 after a series of phlebotomy.   Was on every 3 month cycle in 2021 and this was changed to every 6 months in 2022.  Last few phleb 2/2023 and 8/2023.  Ferritin 8/2023 136.  Hb stable. Phlebotomy 2/2024--ferritin 130.   Completed Recombivax in June 2024.     Vitamin D insufficiency  Vitamin D level just below normal 12/2022.  He was taking vitamin D 5000 units daily, but stopped it somewhere along the way.  Level normal 6/2024.      Gastric intestinal metaplasia with h/o gastric ulcers  Endoscopy 9/2021 to follow-up on gastric ulcer from 7/2021 showed antral gastric intestinal metaplasia.  He had previously had H pylori in July which was treated and then found to be cured.  Metaplasia gastric mapping via biopsy done 5/2022.  No neoplasia noted.  Plan to repeat endoscopy 5/2025.     Adenomatous colon polyps and family hx colon polyps  Adenomas were removed in 2012 and 2017.  No adenomas 2/2020.  He will need a repeat colonoscopy in 2/2025.  His sister also has had colon polyps less than 60 years old.      Past Medical History:   Diagnosis Date   • Arthritis    • Diverticulosis    • ED (erectile dysfunction)    • Family history of colonic polyps    • GERD (gastroesophageal reflux disease)    • Hereditary hemochromatosis    • History of adenomatous polyp of colon    • History of colon polyps    • Hypertension    • Other cirrhosis of liver        Past Surgical History:   Procedure Laterality Date   • COLONOSCOPY N/A 02/10/2017    One 7mm sessile serated adenomatous polyp in the ascending colon; Two 5-7mm adenomatous polyps in the transverse colon; One 4mm hyperplastic polyp in the rectum; Repeat 3 years   • COLONOSCOPY  10/25/2012    One 6mm adenomatous polyp in the sigmoid colon; Repeat 4 years   • COLONOSCOPY N/A 02/24/2020    Diverticulosis in the left colon; The examination was otherwise normal on direct and retroflexion views; No specimens collected; Repeat 5 years   • ENDOSCOPY N/A 07/19/2021    Non-bleeding gastric ulcer with no stigmata of bleeding-biopsied; Non-bleeding erosive gastropathy; Normal examined duodenum; Repeat 2 months   • ENDOSCOPY N/A 09/20/2021    Small HH; Erythematous mucosa in the antrum-biopsied; Normal  examined duodenum   • ENDOSCOPY N/A 05/04/2022    LA Grade A reflux esophagitis; Erythematous mucosa in the antrum-biopsied; Normal examined duodenum   • MULTIPLE TOOTH EXTRACTIONS     • TONSILLECTOMY          Current Outpatient Medications:   •  Cholecalciferol (Vitamin D-3) 125 MCG (5000 UT) tablet, Take 1 tablet by mouth Daily., Disp: , Rfl:   •  lisinopril (PRINIVIL,ZESTRIL) 20 MG tablet, Take 1 tablet by mouth Daily., Disp: , Rfl:   •  meloxicam (MOBIC) 15 MG tablet, Take 1 tablet by mouth Daily., Disp: , Rfl:   •  omeprazole (priLOSEC) 20 MG capsule, Take 1 capsule by mouth Daily., Disp: , Rfl:   •  sildenafil (VIAGRA) 100 MG tablet, Take 1 tablet by mouth As Needed., Disp: , Rfl:   •  metoclopramide (REGLAN) 10 MG tablet, 1 tablet 30 minutes before each part of colon prep, Disp: 2 tablet, Rfl: 0  •  sodium-potassium-magnesium sulfates (Suprep Bowel Prep Kit) 17.5-3.13-1.6 GM/177ML solution oral solution, Take 1 bottle by mouth Take As Directed for 1 day. Split dose prep as directed by office instructions provided.  2 bottles (354ml) = one kit., Disp: 354 mL, Rfl: 0    Allergies   Allergen Reactions   • Codeine Nausea And Vomiting       Social History     Socioeconomic History   • Marital status:    Tobacco Use   • Smoking status: Never   • Smokeless tobacco: Never   Vaping Use   • Vaping status: Never Used   Substance and Sexual Activity   • Alcohol use: Not Currently   • Drug use: No   • Sexual activity: Defer       Family History   Problem Relation Age of Onset   • Colon polyps Sister         < 60 years of age   • No Known Problems Mother    • Heart disease Father    • Colon cancer Neg Hx    • Esophageal cancer Neg Hx    • Liver cancer Neg Hx    • Liver disease Neg Hx    • Rectal cancer Neg Hx    • Stomach cancer Neg Hx        Review of Systems   Respiratory:  Negative for shortness of breath.    Cardiovascular:  Negative for chest pain.       Objective    /78 (BP Location: Left arm, Patient  "Position: Sitting, Cuff Size: Adult)   Pulse 75   Temp 97.7 °F (36.5 °C) (Infrared)   Ht 170.2 cm (67\")   Wt 93.9 kg (207 lb)   SpO2 96%   BMI 32.42 kg/m²     Physical Exam  Constitutional:       Appearance: He is well-developed.   Pulmonary:      Effort: Pulmonary effort is normal.   Musculoskeletal:         General: Normal range of motion.   Skin:     General: Skin is warm.   Neurological:      Mental Status: He is alert and oriented to person, place, and time.   Psychiatric:         Behavior: Behavior normal.         Lab Results - Last 18 Months   Lab Units 10/15/24  1410 06/13/24  1428   GLUCOSE mg/dL 110* 158*   BUN mg/dL 15 16   CREATININE mg/dL 1.2 1.23   SODIUM mmol/L 140 140   POTASSIUM mmol/L 4.2 3.5   CHLORIDE mmol/L 102 103   TOTAL CO2 mmol/L 29  --    CO2 mmol/L  --  26.0   TOTAL PROTEIN g/dL 7.2 7.1   ALBUMIN g/dL 3.9 3.9   ALT (SGPT) U/L 28 25   AST (SGOT) U/L 26 27   ALK PHOS U/L 96 101   BILIRUBIN mg/dL 1.0 0.8   GLOBULIN gm/dL  --  3.2   ALPHA-FETOPROTEIN ng/mL  --  3.99       Lab Results - Last 18 Months   Lab Units 10/15/24  1410 06/13/24  1428 02/27/24  0909 08/03/23  0807 07/13/23  0738   HEMOGLOBIN g/dL 16.4 15.4 15.9 16.3 16.4   HEMATOCRIT % 49.6 44.4 45.9 48.4 48.0   MCV fL 101.2* 94.5 95.4 96.0 98.0*   WBC K/uL 11.8* 10.91* 10.74 9.54 9.2   RDW % 12.4 12.6 12.5 12.7 13.2   MPV fL 13.3* 12.6* 12.1* 12.1* 12.0   PLATELETS K/uL 113* 115* 107* 122* 142   INR   --  1.00  --   --   --        Lab Results - Last 18 Months   Lab Units 06/13/24  1428 02/27/24  0909 08/03/23  0807 07/28/23  1247   IRON ug/dL  --   --   --  174*   FERRITIN ng/mL  --  129.50 136.60 149.1   VIT D 25 HYDROXY ng/ml 60.2  --   --   --         Lab Results - Last 18 Months   Lab Units 06/13/24  1428 02/27/24  0909   ALPHA-FETOPROTEIN ng/mL 3.99  --    FERRITIN ng/mL  --  129.50           IMPRESSION/PLAN:    Assessment & Plan     Problem List Items Addressed This Visit          Coag and Thromboembolic    Thrombocytopenia "    Overview     Likely related to advancing liver disease.  Plt in the low 100K range.         Relevant Orders    CBC Auto Differential       Endocrine and Metabolic    Vitamin D insufficiency    Overview     Vitamin D level slightly low 12/2022.  He was taking vitamin D 5000 units daily, but stopped it somewhere along the way.  Level normal 7/2023.  He knows to resume it.  He is taking 5000 U daily and level is normal 6/2024.         Hereditary hemochromatosis    Overview     Homozygous for H63D.  Ferritin 700 range originally and corrected with phlebotomy. Pt aware that hereditary hemochromatosis is an independent risk factor for hepatocellular carcinoma.  He will need laboratories and serial sonograms of liver done on a regular basis.    Ferritin in correct range starting 9/2021 after a series of phlebotomy.  Was on every 3 month cycle in 2021.  Has been on every 6 month cycle 2022 and ferritins are holding in acceptable range.  Phleb done 2/2023, 8/2023, 2/2024.    Will check ferritin and make decision as to when next phlebotomy needs to take place.         Relevant Orders    Ferritin       Family History    Family history of polyps in the colon    Overview     Sister had colon polyps less than 60 years old.         Relevant Medications    metoclopramide (REGLAN) 10 MG tablet    sodium-potassium-magnesium sulfates (Suprep Bowel Prep Kit) 17.5-3.13-1.6 GM/177ML solution oral solution    Other Relevant Orders    Case Request (Completed)       Gastrointestinal Abdominal     History of adenomatous polyp of colon    Overview     Adenomatous colon polyps were removed in 2012 and 2017.  Last colonoscopy 2/2020 unremarkable.  He will be due for repeat colonoscopy in 2/2025.    Preparation for colonoscopy 2/2020 was done with Suprep.  The preparation was adequate to identify polyps greater than 6 mm in size.  He had nausea and vomiting with the second dose.  Will plan to give Reglan prior to each dose of Suprep.  Also  have him drink MiraLAX 3 times a day for 3 days prior to prepping.         Current Assessment & Plan     The risks, benefits, and alternatives of colonoscopy were reviewed with the patient today.  Risks including perforation of the colon possibly requiring surgery or colostomy.  Additional risks include risk of bleeding from biopsies or removal of colon tissue.  There is also the risk of a drug reaction or problems with anesthesia.  This will be discussed with the further by the anesthesia team on the day of the procedure.  Lastly there is a possibility of missing a colon polyp or cancer.  The benefits include the diagnosis and management of disease of the colon and rectum.  Alternatives to colonoscopy include barium enema, laboratory testing, radiographic evaluation, or no intervention.  The patient verbalizes understanding and agrees.    In accordance with requirements under the Affordable Care Act, [facility] has provided pricing for all hospital services and items on each of its websites. However, a patient's actual cost may differ based on the services the patient receives to meet individual healthcare needs and based on the benefits provided under the patient’s insurance coverage.           Relevant Medications    metoclopramide (REGLAN) 10 MG tablet    sodium-potassium-magnesium sulfates (Suprep Bowel Prep Kit) 17.5-3.13-1.6 GM/177ML solution oral solution    Other Relevant Orders    Case Request (Completed)    Other cirrhosis of liver - Primary    Overview     CTA of the chest shows nodular liver suggestive of cirrhosis.  Sono imaging 7/2022, 1/2023 and 6/2023 suggestive of cirrhosis.  LFTs are normal.  Full serological evaluation unremarkable with exception of a ferritin in the 700 range with increased iron saturation. Homozygous for H63D mutation.  Patient is at risk for hepatoma formation given cirrhosis; will need to have serial sono of the liver and AFP Measurements every 6-12 months going forward.       Fib 4 7/22 = 2.46   Fib 4 6/2023 = 2.79  Fib 4 6/2024 = 3.15    Fib-4 scoring system  Points <1.45:                      Cirrhosis less likely  Points >=1.45 and <=3.25:       Indeterminate  Points >3.25:                      Cirrhosis more likely    Elastography 6/2021 = F3.    MELD 12/2022 = 8  MELD 6/2023 = 8  MELD 6/2024 = 8    No varices on endoscopy 2021 and again in 2022.  He is not on a beta-blocker.  Due to repeat endoscopy in 2025.    Coffee consumption has been shown to decrease the risk of HCC in patients with chronic liver disease.  In these patients, coffee consumption should be encouraged.  He is now drinking 2 to 3 cups daily with breakfast.    The patient was advised to stay active as possible and exercise as tolerated on a regular basis.  This is to pope off muscle wasting.  Chronic liver disease is a catabolic condition that will likely result in loss of muscle mass. Sarcopenia is associated with 2.2 fold increased risk of death and is more predictive of death than well-accepted indicaors of liver function like MELD and CPT.    The best way of slowing this process is to exercise regularly as much as possible as well as to ingest a moderate amount of dietary protein, at least 1.0-1.5mg/kg.  The patient should have at least 3 servings of protein daily.  Daily caloric intake should be 25-40kcal/kg/day.    These patients are also at risk for osteoporosis and vitamin D deficiency.  Jacinda D level normal 6/2024.  Sarcopenia worsens bone formation.  If evidence of osteoporosis, then treatment with biphosphonates and calcium are safe and recommended.    Fluid restriction should only take place if there is significant hyponatremia.    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    Pt. also advised to take multivitamin daily.    Vaccines are important as well:    All adults need yearly vaccines against influenza    Twinrix vaccination completed--immune to Hep A , but not Hep B.   Recombivax completed in 2024.  Blood work now shows that he is immune to hepatitis B.    All patients should receive pneumococcal vaccines--both the PCV13 and the PPSV23.  Pneumococcus PCV 13 (1 dose only, 12 months apart from PPSV 23),  and Pneumococcus PPSV 23 (every 5 years, for 2 doses and third dose at age 65)    These vaccines can be obtained through the patients PCP or alternatively, they can be given at [facility] without a prescription.         Relevant Orders    AFP Tumor Marker    CBC Auto Differential    Comprehensive Metabolic Panel    Protime-INR    Ferritin    Vitamin B12 & Folate    Intestinal metaplasia of gastric mucosa    Overview     Found on endoscopy 2021.  Mapping done 5/2022 and intestinal metaplasia found involving the body of the stomach.  The antrum was spared.  H pylori has already been eradicated.  Will need repeat endoscopy 5/2025.    Will try to do both endoscopy and colonoscopy at the same time.  His colonoscopy is due in February 2025; endoscopy in 5/2025.  Will try make arrangements for both.         Current Assessment & Plan     The risks, benefits, and alternatives of endoscopy were reviewed with the patient today.  Risks including perforation, with or without dilation, possibly requiring surgery.  Additional risks include risk of bleeding.  There is also the risk of a drug reaction or problems with anesthesia.  This will be discussed with the further by the anesthesia team on the day of the procedure. The benefits include the diagnosis and management of disease of the upper digestive tract.  Alternatives to endoscopy include upper GI series, laboratory testing, radiographic evaluation, or no intervention.  The patient verbalizes understanding and agrees.    In accordance with requirements under the Affordable Care Act, [facility] has provided pricing for all hospital services and items on each of its websites. However, a patient's actual cost may differ based  on the services the patient receives to meet individual healthcare needs and based on the benefits provided under the patient’s insurance coverage.           Relevant Medications    metoclopramide (REGLAN) 10 MG tablet    Other Relevant Orders    CBC Auto Differential    Case Request (Completed)       Other    Hepatitis A immune    Overview     Labs confirm immunity 12/2022.         Immune to hepatitis B    Overview     He is immune to hepatitis A.  He is not immune to hepatitis B.  Hep B completed in 6/2024.  He is now immune per labs.                MEDICAL COMPLEXITY must have 2 out of 3   Moderate Complexity Level 4                                                                                                              1 of the following medical problems:                                                                                               []One chronic illness with mild exacerbation                                                                                [x]Two or more stable chronic illness                                                                                              []One new problem  []One acute illness with systemic symptoms     Complexity of Data  Reviewed (1 out of the 3 following categories)                                             Category 1 tests, documents, historian (must have 3 points)                                                      []Review of prior external records  []Review of results of unique tests  [x]Ordering unique tests   []Assessment requires an independent historian   Category 2 Interpretation of tests     []Independent interpretation of test read by another doc   Category 3 Discuss Management/tests  []Discussion with external physician     Risk of complications and/or morbidity                                                                                           [x]Prescription Drug Management     [x]Decision for elective endoscopic  procedure            RTC 1 year      Dorothea Sales MD  01/08/25  14:35 CST    Part of this note may be an electronic transcription/translation of spoken language to printed text.

## 2025-01-08 NOTE — ASSESSMENT & PLAN NOTE
The risks, benefits, and alternatives of colonoscopy were reviewed with the patient today.  Risks including perforation of the colon possibly requiring surgery or colostomy.  Additional risks include risk of bleeding from biopsies or removal of colon tissue.  There is also the risk of a drug reaction or problems with anesthesia.  This will be discussed with the further by the anesthesia team on the day of the procedure.  Lastly there is a possibility of missing a colon polyp or cancer.  The benefits include the diagnosis and management of disease of the colon and rectum.  Alternatives to colonoscopy include barium enema, laboratory testing, radiographic evaluation, or no intervention.  The patient verbalizes understanding and agrees.    In accordance with requirements under the Affordable Care Act, [facility] has provided pricing for all hospital services and items on each of its websites. However, a patient's actual cost may differ based on the services the patient receives to meet individual healthcare needs and based on the benefits provided under the patient’s insurance coverage.

## 2025-01-09 ENCOUNTER — TELEPHONE (OUTPATIENT)
Dept: GASTROENTEROLOGY | Facility: CLINIC | Age: 70
End: 2025-01-09
Payer: COMMERCIAL

## 2025-01-09 LAB
ALPHA-FETOPROTEIN: 3.21 NG/ML (ref 0–8.3)
FOLATE SERPL-MCNC: 9.54 NG/ML (ref 4.78–24.2)
VIT B12 BLD-MCNC: 546 PG/ML (ref 211–946)

## 2025-01-09 NOTE — TELEPHONE ENCOUNTER
Please let him know that his labs from yesterday were stable.  Ferritin is slowly creeping up.  Lets go ahead and arrange a 500 cc phlebotomy anytime.     Dorothea Sales MD

## 2025-01-09 NOTE — TELEPHONE ENCOUNTER
Called and spoke to pt re: results-he VU. I advised him I would fax orders to Aleta in the lab so that he can set up phlebotomy. He will call her middle of next week if he hasn't heard from her to arrange. Pt advised to call me back with any further questions/problems.

## 2025-01-14 ENCOUNTER — TRANSCRIBE ORDERS (OUTPATIENT)
Dept: ADMINISTRATIVE | Facility: HOSPITAL | Age: 70
End: 2025-01-14
Payer: COMMERCIAL

## 2025-01-14 ENCOUNTER — LAB (OUTPATIENT)
Dept: LAB | Facility: HOSPITAL | Age: 70
End: 2025-01-14
Payer: MEDICARE

## 2025-01-14 DIAGNOSIS — E83.110 HEREDITARY HEMOCHROMATOSIS: Primary | ICD-10-CM

## 2025-01-14 LAB
FERRITIN SERPL-MCNC: 240.4 NG/ML (ref 30–400)
HCT VFR BLD AUTO: 48.2 % (ref 37.5–51)
HGB BLD-MCNC: 16.3 G/DL (ref 13–17.7)
Lab: NORMAL
POST-BLOOD PRESSURE: NORMAL MMHG
PRE-BLOOD PRESSURE: NORMAL MMHG
PRE-HCT: 48.2 %
PRE-HGB: 16.3 G/DL
PRE-PULSE: 64 BPM
VOLUME COLLECTED: 500 ML

## 2025-01-14 PROCEDURE — 85018 HEMOGLOBIN: CPT

## 2025-01-14 PROCEDURE — 85014 HEMATOCRIT: CPT

## 2025-01-14 PROCEDURE — 82728 ASSAY OF FERRITIN: CPT

## 2025-01-14 PROCEDURE — 36415 COLL VENOUS BLD VENIPUNCTURE: CPT

## 2025-01-14 PROCEDURE — 99195 PHLEBOTOMY: CPT | Performed by: INTERNAL MEDICINE

## 2025-03-13 DIAGNOSIS — K21.9 GASTROESOPHAGEAL REFLUX DISEASE WITHOUT ESOPHAGITIS: ICD-10-CM

## 2025-03-13 RX ORDER — OMEPRAZOLE 20 MG/1
20 CAPSULE, DELAYED RELEASE ORAL DAILY PRN
Qty: 30 CAPSULE | Refills: 5 | Status: SHIPPED | OUTPATIENT
Start: 2025-03-13

## 2025-03-14 DIAGNOSIS — K21.9 GASTROESOPHAGEAL REFLUX DISEASE WITHOUT ESOPHAGITIS: ICD-10-CM

## 2025-03-14 RX ORDER — OMEPRAZOLE 20 MG/1
20 CAPSULE, DELAYED RELEASE ORAL DAILY PRN
Qty: 30 CAPSULE | Refills: 12 | OUTPATIENT
Start: 2025-03-14

## 2025-03-19 ENCOUNTER — TELEPHONE (OUTPATIENT)
Dept: GASTROENTEROLOGY | Facility: CLINIC | Age: 70
End: 2025-03-19
Payer: COMMERCIAL

## 2025-03-19 NOTE — TELEPHONE ENCOUNTER
I rec'd a fax from the lab requesting a new phlebotomy order be sent to them. However, I thought we were just doing them as needed. He just had a phlebotomy 1/14/2025-does he need another one at this point? If so-I will bring you a form to sign to fax back to the lab. If not-I will call lab to update them.

## 2025-03-20 NOTE — TELEPHONE ENCOUNTER
No.  He doesn't need a phlebotomy at this point.  I'll be monitoring labs to determine when next one due.  Thanks!    Dorothea Sales MD

## 2025-03-20 NOTE — TELEPHONE ENCOUNTER
Tried to call Aleta in the lab to update her about pt's phlebotomy-was unable to reach anyone so I left detailed VM for them about the fact pt doesn't need another phlebotomy at this time.

## 2025-04-07 ENCOUNTER — ANESTHESIA (OUTPATIENT)
Dept: GASTROENTEROLOGY | Facility: HOSPITAL | Age: 70
End: 2025-04-07
Payer: COMMERCIAL

## 2025-04-07 ENCOUNTER — HOSPITAL ENCOUNTER (OUTPATIENT)
Facility: HOSPITAL | Age: 70
Setting detail: HOSPITAL OUTPATIENT SURGERY
Discharge: HOME | End: 2025-04-07
Attending: INTERNAL MEDICINE | Admitting: INTERNAL MEDICINE
Payer: COMMERCIAL

## 2025-04-07 ENCOUNTER — ANESTHESIA EVENT (OUTPATIENT)
Dept: GASTROENTEROLOGY | Facility: HOSPITAL | Age: 70
End: 2025-04-07
Payer: COMMERCIAL

## 2025-04-07 VITALS
DIASTOLIC BLOOD PRESSURE: 83 MMHG | BODY MASS INDEX: 31.23 KG/M2 | OXYGEN SATURATION: 95 % | HEIGHT: 67 IN | RESPIRATION RATE: 18 BRPM | TEMPERATURE: 97.6 F | WEIGHT: 199 LBS | HEART RATE: 67 BPM | SYSTOLIC BLOOD PRESSURE: 129 MMHG

## 2025-04-07 DIAGNOSIS — K31.A0 INTESTINAL METAPLASIA OF GASTRIC MUCOSA: ICD-10-CM

## 2025-04-07 DIAGNOSIS — Z83.719 FAMILY HISTORY OF POLYPS IN THE COLON: ICD-10-CM

## 2025-04-07 DIAGNOSIS — K74.69 OTHER CIRRHOSIS OF LIVER: ICD-10-CM

## 2025-04-07 DIAGNOSIS — Z86.0101 HISTORY OF ADENOMATOUS POLYP OF COLON: ICD-10-CM

## 2025-04-07 DIAGNOSIS — D69.6 THROMBOCYTOPENIA: ICD-10-CM

## 2025-04-07 DIAGNOSIS — E83.110 HEREDITARY HEMOCHROMATOSIS: Primary | ICD-10-CM

## 2025-04-07 PROCEDURE — 43239 EGD BIOPSY SINGLE/MULTIPLE: CPT | Performed by: INTERNAL MEDICINE

## 2025-04-07 PROCEDURE — 25810000003 SODIUM CHLORIDE 0.9 % SOLUTION: Performed by: NURSE ANESTHETIST, CERTIFIED REGISTERED

## 2025-04-07 PROCEDURE — 25010000002 PROPOFOL 10 MG/ML EMULSION

## 2025-04-07 PROCEDURE — 25010000002 LIDOCAINE PF 2% 2 % SOLUTION

## 2025-04-07 PROCEDURE — 45385 COLONOSCOPY W/LESION REMOVAL: CPT | Performed by: INTERNAL MEDICINE

## 2025-04-07 PROCEDURE — 88305 TISSUE EXAM BY PATHOLOGIST: CPT | Performed by: INTERNAL MEDICINE

## 2025-04-07 RX ORDER — PROPOFOL 10 MG/ML
VIAL (ML) INTRAVENOUS AS NEEDED
Status: DISCONTINUED | OUTPATIENT
Start: 2025-04-07 | End: 2025-04-07 | Stop reason: SURG

## 2025-04-07 RX ORDER — SODIUM CHLORIDE 9 MG/ML
500 INJECTION, SOLUTION INTRAVENOUS ONCE
Status: COMPLETED | OUTPATIENT
Start: 2025-04-07 | End: 2025-04-07

## 2025-04-07 RX ORDER — SODIUM CHLORIDE 0.9 % (FLUSH) 0.9 %
10 SYRINGE (ML) INJECTION AS NEEDED
Status: DISCONTINUED | OUTPATIENT
Start: 2025-04-07 | End: 2025-04-07 | Stop reason: HOSPADM

## 2025-04-07 RX ORDER — LIDOCAINE HYDROCHLORIDE 20 MG/ML
INJECTION, SOLUTION EPIDURAL; INFILTRATION; INTRACAUDAL; PERINEURAL AS NEEDED
Status: DISCONTINUED | OUTPATIENT
Start: 2025-04-07 | End: 2025-04-07 | Stop reason: SURG

## 2025-04-07 RX ADMIN — PROPOFOL 400 MG: 10 INJECTION, EMULSION INTRAVENOUS at 08:09

## 2025-04-07 RX ADMIN — SODIUM CHLORIDE 500 ML: 9 INJECTION, SOLUTION INTRAVENOUS at 07:43

## 2025-04-07 RX ADMIN — LIDOCAINE HYDROCHLORIDE 100 MG: 20 INJECTION, SOLUTION EPIDURAL; INFILTRATION; INTRACAUDAL; PERINEURAL at 08:09

## 2025-04-07 NOTE — ANESTHESIA PREPROCEDURE EVALUATION
Anesthesia Evaluation     Patient summary reviewed   no history of anesthetic complications:   NPO Solid Status: > 8 hours  NPO Liquid Status: > 8 hours           Airway   Mallampati: II  TM distance: >3 FB  Neck ROM: full  Dental    (+) upper dentures and lower dentures    Pulmonary - negative pulmonary ROS   (-) asthma, sleep apnea, not a smoker  Cardiovascular   Exercise tolerance: good (4-7 METS)    (+) hypertension  (-) past MI, dysrhythmias, cardiac stents      Neuro/Psych- negative ROS  (-) seizures, TIA, CVA  GI/Hepatic/Renal/Endo    (+) GERD, liver disease (hemochromatosis, phlebotomy q6 months)    Musculoskeletal     Abdominal    Substance History      OB/GYN          Other                          Anesthesia Plan    ASA 2     MAC     intravenous induction     Anesthetic plan, risks, benefits, and alternatives have been provided, discussed and informed consent has been obtained with: patient.

## 2025-04-07 NOTE — H&P
Chief Complaint:   History of gastric intestinal metaplasia and colon polyps    Subjective     HPI:   Patient has GIM.  Last endoscopy was 3 years ago.  Also has known cirrhosis.  Here to screen for varices and dysplasia for GIM.  Also has known history of colon polyps.    Past Medical History:   Past Medical History:   Diagnosis Date    Arthritis     Diverticulosis     ED (erectile dysfunction)     Family history of colonic polyps     GERD (gastroesophageal reflux disease)     Hereditary hemochromatosis     History of adenomatous polyp of colon     History of colon polyps     Hypertension     Other cirrhosis of liver        Past Surgical History:  Past Surgical History:   Procedure Laterality Date    COLONOSCOPY N/A 02/10/2017    One 7mm sessile serated adenomatous polyp in the ascending colon; Two 5-7mm adenomatous polyps in the transverse colon; One 4mm hyperplastic polyp in the rectum; Repeat 3 years    COLONOSCOPY  10/25/2012    One 6mm adenomatous polyp in the sigmoid colon; Repeat 4 years    COLONOSCOPY N/A 02/24/2020    Diverticulosis in the left colon; The examination was otherwise normal on direct and retroflexion views; No specimens collected; Repeat 5 years    COLONOSCOPY N/A 4/7/2025    Procedure: COLONOSCOPY WITH ANESTHESIA;  Surgeon: Dorothea Sales MD;  Location: [facility] ENDOSCOPY;  Service: Gastroenterology;  Laterality: N/A;  preop; hx of polyps  postop polyps   [facility]    ENDOSCOPY N/A 07/19/2021    Non-bleeding gastric ulcer with no stigmata of bleeding-biopsied; Non-bleeding erosive gastropathy; Normal examined duodenum; Repeat 2 months    ENDOSCOPY N/A 09/20/2021    Small HH; Erythematous mucosa in the antrum-biopsied; Normal examined duodenum    ENDOSCOPY N/A 05/04/2022    LA Grade A reflux esophagitis; Erythematous mucosa in the antrum-biopsied; Normal examined duodenum    ENDOSCOPY N/A 4/7/2025    Procedure: ESOPHAGOGASTRODUODENOSCOPY WITH ANESTHESIA;  Surgeon: Dorothea Sales  "MD;  Location: [facility] ENDOSCOPY;  Service: Gastroenterology;  Laterality: N/A;  preop; hx of cirrhosis   postop gastrointestinal dysplasia   PCP Joaquin Monteiro    MULTIPLE TOOTH EXTRACTIONS      TONSILLECTOMY          Family History:  Family History   Problem Relation Age of Onset    Colon polyps Sister         < 60 years of age    No Known Problems Mother     Heart disease Father     Colon cancer Neg Hx     Esophageal cancer Neg Hx     Liver cancer Neg Hx     Liver disease Neg Hx     Rectal cancer Neg Hx     Stomach cancer Neg Hx        Social History:   reports that he has never smoked. He has never used smokeless tobacco. He reports that he does not currently use alcohol. He reports that he does not use drugs.    Medications:   No medications prior to admission.       Allergies:  Codeine    ROS:    Resp: No SOA  Cardiovascular: No CP      Objective     /83   Pulse 67   Temp 97.6 °F (36.4 °C) (Temporal)   Resp 18   Ht 170.2 cm (67\")   Wt 90.3 kg (199 lb)   SpO2 95%   BMI 31.17 kg/m²     Physical Exam   Constitutional: Patient is oriented to person, place, and in no distress.  Pulmonary/Chest: No distress.  No audible wheezes  Psychiatric: Mood, memory, affect and judgment appear normal.     Assessment & Plan     Diagnosis:  GIM  Cirrhosis, screen for varices  History of colon polyps      Anticipated Surgical Procedure:  Colonoscopy    The risks, benefits, and alternatives of colonoscopy were reviewed with the patient today.  Risks including perforation of the colon possibly requiring surgery or colostomy.  Additional risks include risk of bleeding from biopsies or removal of colon tissue.  There is also the risk of a drug reaction or problems with anesthesia.  This will be discussed with the patient further by the anesthesia team on the day of the procedure.  Lastly there is a possibility of missing a colon polyp or cancer.  The benefits include the diagnosis and management of disease of the colon and " rectum.  Alternatives to colonoscopy include barium enema, laboratory testing, radiographic evaluation, or no intervention.  The patient verbalizes understanding and agrees.        Please note that portions of this note were completed with a voice recognition program.

## 2025-04-07 NOTE — ANESTHESIA POSTPROCEDURE EVALUATION
"Patient: Frankie Reid    Procedure Summary       Date: 04/07/25 Room / Location:  [facility] /  [facility]    Anesthesia Start: 0807 Anesthesia Stop: 0843    Procedures:       ESOPHAGOGASTRODUODENOSCOPY WITH ANESTHESIA      COLONOSCOPY WITH ANESTHESIA Diagnosis:       Intestinal metaplasia of gastric mucosa      Family history of polyps in the colon      History of adenomatous polyp of colon      (Intestinal metaplasia of gastric mucosa [K31.A0])      (Family history of polyps in the colon [Z83.719])      (History of adenomatous polyp of colon [Z86.0101])    Surgeons: Dorothea Sales MD Provider: Eamon Banks CRNA    Anesthesia Type: MAC ASA Status: 2            Anesthesia Type: MAC    Vitals  Vitals Value Taken Time   BP     Temp     Pulse 80 04/07/25 08:43   Resp     SpO2 93 % 04/07/25 08:43   Vitals shown include unfiled device data.        Post Anesthesia Care and Evaluation    Patient location during evaluation: PHASE II  Patient participation: complete - patient participated  Level of consciousness: awake and alert  Pain management: adequate    Airway patency: patent  Anesthetic complications: No anesthetic complications  PONV Status: none  Cardiovascular status: acceptable  Respiratory status: acceptable  Hydration status: acceptable    Comments: Blood pressure 138/87, pulse 61, temperature 97.6 °F (36.4 °C), temperature source Temporal, resp. rate 17, height 170.2 cm (67\"), weight 90.3 kg (199 lb), SpO2 94%.    No anesthesia care post op    "

## 2025-04-08 LAB
CYTO UR: NORMAL
LAB AP CASE REPORT: NORMAL
Lab: NORMAL
PATH REPORT.FINAL DX SPEC: NORMAL
PATH REPORT.GROSS SPEC: NORMAL

## 2025-04-17 ENCOUNTER — TELEPHONE (OUTPATIENT)
Dept: PRIMARY CARE CLINIC | Age: 70
End: 2025-04-17

## 2025-04-17 NOTE — TELEPHONE ENCOUNTER
----- Message from Farhan DALAL sent at 4/15/2025  1:41 PM CDT -----  Regarding: ECC Message to Provider  ECC Message to Provider    Relationship to Patient: Self     Additional Information, pt called in because he would like to know if he needs to do blood work before his appt on 4/22/2025.  ------------------------------------------------------------------------------------------------------------------------    Call Back Information: OK to leave message on voicemail or leave a text message  Preferred Call Back Number: Phone  [phone]

## 2025-04-22 ENCOUNTER — OFFICE VISIT (OUTPATIENT)
Dept: PRIMARY CARE CLINIC | Age: 70
End: 2025-04-22
Payer: COMMERCIAL

## 2025-04-22 VITALS
BODY MASS INDEX: 32.49 KG/M2 | OXYGEN SATURATION: 97 % | HEART RATE: 64 BPM | WEIGHT: 207 LBS | SYSTOLIC BLOOD PRESSURE: 136 MMHG | DIASTOLIC BLOOD PRESSURE: 64 MMHG | HEIGHT: 67 IN | TEMPERATURE: 97.1 F

## 2025-04-22 DIAGNOSIS — Z13.220 LIPID SCREENING: ICD-10-CM

## 2025-04-22 DIAGNOSIS — M15.0 PRIMARY OSTEOARTHRITIS INVOLVING MULTIPLE JOINTS: ICD-10-CM

## 2025-04-22 DIAGNOSIS — K21.9 GASTROESOPHAGEAL REFLUX DISEASE WITHOUT ESOPHAGITIS: ICD-10-CM

## 2025-04-22 DIAGNOSIS — I10 ESSENTIAL (PRIMARY) HYPERTENSION: Primary | ICD-10-CM

## 2025-04-22 DIAGNOSIS — Z12.5 ENCOUNTER FOR PROSTATE CANCER SCREENING: ICD-10-CM

## 2025-04-22 PROCEDURE — 3075F SYST BP GE 130 - 139MM HG: CPT | Performed by: FAMILY MEDICINE

## 2025-04-22 PROCEDURE — 1123F ACP DISCUSS/DSCN MKR DOCD: CPT | Performed by: FAMILY MEDICINE

## 2025-04-22 PROCEDURE — G2211 COMPLEX E/M VISIT ADD ON: HCPCS | Performed by: FAMILY MEDICINE

## 2025-04-22 PROCEDURE — 1036F TOBACCO NON-USER: CPT | Performed by: FAMILY MEDICINE

## 2025-04-22 PROCEDURE — G8427 DOCREV CUR MEDS BY ELIG CLIN: HCPCS | Performed by: FAMILY MEDICINE

## 2025-04-22 PROCEDURE — 99214 OFFICE O/P EST MOD 30 MIN: CPT | Performed by: FAMILY MEDICINE

## 2025-04-22 PROCEDURE — G8417 CALC BMI ABV UP PARAM F/U: HCPCS | Performed by: FAMILY MEDICINE

## 2025-04-22 PROCEDURE — 3078F DIAST BP <80 MM HG: CPT | Performed by: FAMILY MEDICINE

## 2025-04-22 PROCEDURE — 3017F COLORECTAL CA SCREEN DOC REV: CPT | Performed by: FAMILY MEDICINE

## 2025-04-22 RX ORDER — MELOXICAM 15 MG/1
15 TABLET ORAL DAILY PRN
Qty: 30 TABLET | Refills: 5 | Status: SHIPPED | OUTPATIENT
Start: 2025-04-22

## 2025-04-22 RX ORDER — MULTIVIT-MIN/IRON/FOLIC ACID/K 18-600-40
2000 CAPSULE ORAL DAILY
COMMUNITY

## 2025-04-22 SDOH — ECONOMIC STABILITY: FOOD INSECURITY: WITHIN THE PAST 12 MONTHS, YOU WORRIED THAT YOUR FOOD WOULD RUN OUT BEFORE YOU GOT MONEY TO BUY MORE.: NEVER TRUE

## 2025-04-22 SDOH — ECONOMIC STABILITY: FOOD INSECURITY: WITHIN THE PAST 12 MONTHS, THE FOOD YOU BOUGHT JUST DIDN'T LAST AND YOU DIDN'T HAVE MONEY TO GET MORE.: NEVER TRUE

## 2025-04-22 ASSESSMENT — PATIENT HEALTH QUESTIONNAIRE - PHQ9
SUM OF ALL RESPONSES TO PHQ QUESTIONS 1-9: 0
1. LITTLE INTEREST OR PLEASURE IN DOING THINGS: NOT AT ALL

## 2025-04-22 NOTE — PROGRESS NOTES
[facility]  [address]  [address]  [place] [address]  Dept: [phone]  Dept Fax: [phone]  Loc: [phone]    Marcio Delarosa is a 70 y.o. male who presents today for his medical conditions/complaints as noted below.  Marcio Delarosa is here for 6 Month Follow-Up      Patient History:      Hemochromatosis with cirrhosis.  -[facility] gastroenterology  - -Receives therapeutic phlebotomies.  -Ultrasound liver: June 2023:The liver is cirrhotic in morphology with scalloped contours in the   left lobe and heterogeneous echotexture. No evidence of focal hepatic   mass   - Ultrasound elastography: Metavir Score F>/=3:Moderate-Severe      Hepatitis B vaccine:  -Engerix B  First series:  January 13, 2022  February 14, 2022  July 15, 2022  -Hepatitis B surface antibody: July 13, 2023 nonreactive      Second series:  October 30, 2023 November 30, 2023 June 4, 2024  -October 15, 2024: Hepatitis B surface antibody: Reactive            Gastritis  -Gastroenterology at [facility]  - -Esophagoduodenoscopy  - - -April 7, 2025:Discolored and erythematous and eroded mucosa in the lesser curvature of the gastric body.  Normal duodenum      pthx           Subjective:   CC:  Here today to discuss the following:    Hypertension  Compliant with medications.  No adverse effects from medication.  No lightheadedness, palpitations, or chest pain.      Gastroesophageal Reflux Disease  Symptoms currently under control.   Medication adequately controls symptoms.  No hematochezia or melena.  He has a history of gastritis currently being followed by gastroenterology.  Remains on omeprazole    Complains of bilateral knee pain.  Previous imaging of the left knee showed medial meniscal tear and osteoarthritis.  Having no buckling or locking but has daily pain.  He is requesting to be placed back on meloxicam.        Review of Systems   Constitutional: Negative for chills and fever.

## 2025-04-22 NOTE — PATIENT INSTRUCTIONS
Examine your lifestyle and the barriers to bad and good habits and how you can design your life to make better choices      Make it EASY to do the RIGHT THINGS.    1)  You can choose to Get 150 min/week of moderate exercise (can talk but can't sing) or 75 min/week of vigorous exercise (can't talk)   Examples: Walking, treadmill, bicycling, attending a gym.    2)  You can choose to Get 7-9 hours of sleep per night    Allow enough time each night to get adequate sleep.  Keep regular evening hours, same to bed and getting up every day.    3)  You can choose to Strength Train 2 x a week on non-consecutive days   Bodyweight exercises such push ups, sit ups, pilates or yoga   Purchase resistance bands to perform exercises   Utilize phone apps such as: [employer] or [employer]   Contact your local gym for a     4)  You can choose good nutrition.    Only eat your goal weight (in lbs) x 10 calories/day (Goal weight 150 lbs x 10 = 1500 calories per day)  Get 5 servings of Vegetables/day   Choose to eat a healthy diet such as the Mediterranean diet.    Plant based diets reduce risk of heart attack/stroke and will help you feel full on less food.    Avoid highly processed foods and processed carbohydrates.   Utilize apps to track your food:  Examples: Loseit, Myfitnesspal, or CalorieCounter by [employer]   Choose to follow a program.   Examples: Weight Watchers, Ronit Bauer, [employer] or Nutrisystem   Utilize apps identify and improve eating habits:   Examples: Eat Right Now, Noom    5)  You can choose to drink less alcohol: Drink less than 1-2 drinks/day no more than 7 drinks per week.    Alcohol will disrupt your sleep and add calories to your day    6)  You can choose to Practice Mindfulness and work on stress reduction.    Utilize apps such as Calm, Headspace, Abide  Contact local behavioral health specialists      Small changes every day will add up         Labs before next appointment:  Go to room 405

## (undated) DEVICE — MASK,OXYGEN,MED CONC,ADLT,7' TUB, UC: Brand: PENDING

## (undated) DEVICE — CONMED SCOPE SAVER BITE BLOCK, 20X27 MM: Brand: SCOPE SAVER

## (undated) DEVICE — THE CHANNEL CLEANING BRUSH IS A NYLON FLEXI BRUSH ATTACHED TO A FLEXIBLE PLASTIC SHEATH DESIGNED TO SAFELY REMOVE DEBRIS FROM FLEXIBLE ENDOSCOPES.

## (undated) DEVICE — MSK O2 MD CONCENTR A/ LF 7FT 1P/U

## (undated) DEVICE — FRCP BX RADJAW4 NDL 2.8 240 STD OG

## (undated) DEVICE — ENDOGATOR AUXILIARY WATER JET CONNECTOR: Brand: ENDOGATOR

## (undated) DEVICE — YANKAUER,BULB TIP WITH VENT: Brand: ARGYLE

## (undated) DEVICE — Device: Brand: DEFENDO AIR/WATER/SUCTION AND BIOPSY VALVE

## (undated) DEVICE — TBG SMPL FLTR LINE NASL 02/C02 A/ BX/100

## (undated) DEVICE — CUFF,BP,DISP,1 TUBE,ADULT,HP: Brand: MEDLINE

## (undated) DEVICE — SENSR O2 OXIMAX FNGR A/ 18IN NONSTR

## (undated) DEVICE — FRCP BIOP ENDO CAPTURAPRO SPK SERR 2.8MM 230CM

## (undated) DEVICE — DEFENDO AIR WATER SUCTION AND BIOPSY VALVE KIT FOR  OLYMPUS: Brand: DEFENDO AIR/WATER/SUCTION AND BIOPSY VALVE

## (undated) DEVICE — THE SINGLE USE ETRAP – POLYP TRAP IS USED FOR SUCTION RETRIEVAL OF ENDOSCOPICALLY REMOVED POLYPS.: Brand: ETRAP

## (undated) DEVICE — SNAR POLYP SENSATION MICRO OVL 13 240X40

## (undated) DEVICE — ARGYLE YANKAUER BULB TIP WITH VENT: Brand: ARGYLE